# Patient Record
Sex: MALE | Race: WHITE | Employment: OTHER | URBAN - METROPOLITAN AREA
[De-identification: names, ages, dates, MRNs, and addresses within clinical notes are randomized per-mention and may not be internally consistent; named-entity substitution may affect disease eponyms.]

---

## 2017-06-21 PROBLEM — R07.9 CHEST PAIN: Status: ACTIVE | Noted: 2017-06-21

## 2017-06-22 PROBLEM — R07.2 PRECORDIAL PAIN: Status: ACTIVE | Noted: 2017-06-21

## 2017-09-11 PROBLEM — R07.2 PRECORDIAL PAIN: Status: RESOLVED | Noted: 2017-06-21 | Resolved: 2017-09-11

## 2017-09-11 PROBLEM — R07.9 CHEST PAIN: Status: ACTIVE | Noted: 2017-09-11

## 2018-07-09 ENCOUNTER — HOSPITAL ENCOUNTER (OUTPATIENT)
Age: 68
Discharge: HOME | End: 2018-07-11
Payer: COMMERCIAL

## 2018-07-09 PROCEDURE — 88305 TISSUE EXAM BY PATHOLOGIST: CPT

## 2018-08-10 ENCOUNTER — HOSPITAL ENCOUNTER (EMERGENCY)
Age: 68
Discharge: OTHER INSTITUTION | End: 2018-08-10
Attending: EMERGENCY MEDICINE
Payer: COMMERCIAL

## 2018-08-10 VITALS
HEART RATE: 64 BPM | DIASTOLIC BLOOD PRESSURE: 82 MMHG | SYSTOLIC BLOOD PRESSURE: 121 MMHG | TEMPERATURE: 98.2 F | RESPIRATION RATE: 16 BRPM | BODY MASS INDEX: 25.03 KG/M2 | OXYGEN SATURATION: 98 % | WEIGHT: 195 LBS | HEIGHT: 74 IN

## 2018-08-10 DIAGNOSIS — R45.851 SUICIDAL IDEATION: Primary | ICD-10-CM

## 2018-08-10 LAB
ACETAMINOPHEN LEVEL: <5 MCG/ML (ref 10–30)
ALBUMIN SERPL-MCNC: 4.2 G/DL (ref 3.5–5.2)
ALP BLD-CCNC: 88 U/L (ref 40–129)
ALT SERPL-CCNC: 13 U/L (ref 0–40)
AMPHETAMINE SCREEN, URINE: NOT DETECTED
ANION GAP SERPL CALCULATED.3IONS-SCNC: 11 MMOL/L (ref 7–16)
AST SERPL-CCNC: 19 U/L (ref 0–39)
BARBITURATE SCREEN URINE: NOT DETECTED
BASOPHILS ABSOLUTE: 0.03 E9/L (ref 0–0.2)
BASOPHILS RELATIVE PERCENT: 0.5 % (ref 0–2)
BENZODIAZEPINE SCREEN, URINE: NOT DETECTED
BILIRUB SERPL-MCNC: 0.4 MG/DL (ref 0–1.2)
BILIRUBIN URINE: NEGATIVE
BLOOD, URINE: NEGATIVE
BUN BLDV-MCNC: 14 MG/DL (ref 8–23)
CALCIUM SERPL-MCNC: 9.1 MG/DL (ref 8.6–10.2)
CANNABINOID SCREEN URINE: NOT DETECTED
CHLORIDE BLD-SCNC: 104 MMOL/L (ref 98–107)
CLARITY: CLEAR
CO2: 26 MMOL/L (ref 22–29)
COCAINE METABOLITE SCREEN URINE: NOT DETECTED
COLOR: YELLOW
CREAT SERPL-MCNC: 1.1 MG/DL (ref 0.7–1.2)
EOSINOPHILS ABSOLUTE: 0.14 E9/L (ref 0.05–0.5)
EOSINOPHILS RELATIVE PERCENT: 2.4 % (ref 0–6)
ETHANOL: <10 MG/DL (ref 0–0.08)
GFR AFRICAN AMERICAN: >60
GFR NON-AFRICAN AMERICAN: >60 ML/MIN/1.73
GLUCOSE BLD-MCNC: 96 MG/DL (ref 74–109)
GLUCOSE URINE: NEGATIVE MG/DL
HCT VFR BLD CALC: 42.4 % (ref 37–54)
HEMOGLOBIN: 14.1 G/DL (ref 12.5–16.5)
IMMATURE GRANULOCYTES #: 0.02 E9/L
IMMATURE GRANULOCYTES %: 0.3 % (ref 0–5)
KETONES, URINE: NEGATIVE MG/DL
LEUKOCYTE ESTERASE, URINE: NEGATIVE
LITHIUM DOSE AMOUNT: ABNORMAL
LITHIUM LEVEL: 0.13 MMOL/L (ref 0.5–1.5)
LYMPHOCYTES ABSOLUTE: 1.25 E9/L (ref 1.5–4)
LYMPHOCYTES RELATIVE PERCENT: 21.8 % (ref 20–42)
MCH RBC QN AUTO: 30.7 PG (ref 26–35)
MCHC RBC AUTO-ENTMCNC: 33.3 % (ref 32–34.5)
MCV RBC AUTO: 92.4 FL (ref 80–99.9)
METHADONE SCREEN, URINE: NOT DETECTED
MONOCYTES ABSOLUTE: 0.66 E9/L (ref 0.1–0.95)
MONOCYTES RELATIVE PERCENT: 11.5 % (ref 2–12)
NEUTROPHILS ABSOLUTE: 3.64 E9/L (ref 1.8–7.3)
NEUTROPHILS RELATIVE PERCENT: 63.5 % (ref 43–80)
NITRITE, URINE: NEGATIVE
OPIATE SCREEN URINE: NOT DETECTED
PDW BLD-RTO: 13.3 FL (ref 11.5–15)
PH UA: 6 (ref 5–9)
PHENCYCLIDINE SCREEN URINE: NOT DETECTED
PLATELET # BLD: 195 E9/L (ref 130–450)
PMV BLD AUTO: 9.9 FL (ref 7–12)
POTASSIUM SERPL-SCNC: 4 MMOL/L (ref 3.5–5)
PROPOXYPHENE SCREEN: NOT DETECTED
PROTEIN UA: NEGATIVE MG/DL
RBC # BLD: 4.59 E12/L (ref 3.8–5.8)
SALICYLATE, SERUM: <0.3 MG/DL (ref 0–30)
SODIUM BLD-SCNC: 141 MMOL/L (ref 132–146)
SPECIFIC GRAVITY UA: 1.01 (ref 1–1.03)
TOTAL PROTEIN: 6.6 G/DL (ref 6.4–8.3)
TRICYCLIC ANTIDEPRESSANTS SCREEN SERUM: NEGATIVE NG/ML
UROBILINOGEN, URINE: 0.2 E.U./DL
WBC # BLD: 5.7 E9/L (ref 4.5–11.5)

## 2018-08-10 PROCEDURE — 80178 ASSAY OF LITHIUM: CPT

## 2018-08-10 PROCEDURE — 81003 URINALYSIS AUTO W/O SCOPE: CPT

## 2018-08-10 PROCEDURE — 80053 COMPREHEN METABOLIC PANEL: CPT

## 2018-08-10 PROCEDURE — 85025 COMPLETE CBC W/AUTO DIFF WBC: CPT

## 2018-08-10 PROCEDURE — 99285 EMERGENCY DEPT VISIT HI MDM: CPT

## 2018-08-10 PROCEDURE — 36415 COLL VENOUS BLD VENIPUNCTURE: CPT

## 2018-08-10 PROCEDURE — G0480 DRUG TEST DEF 1-7 CLASSES: HCPCS

## 2018-08-10 PROCEDURE — 80307 DRUG TEST PRSMV CHEM ANLYZR: CPT

## 2018-08-10 RX ORDER — OMEGA-3 FATTY ACIDS CAP DELAYED RELEASE 1000 MG 1000 MG
2000 CAPSULE DELAYED RELEASE ORAL DAILY
COMMUNITY
End: 2019-09-17 | Stop reason: ALTCHOICE

## 2018-08-10 RX ORDER — ARIPIPRAZOLE 2 MG/1
2 TABLET ORAL DAILY
Status: ON HOLD | COMMUNITY
End: 2020-04-09 | Stop reason: SDUPTHER

## 2018-08-10 RX ORDER — SILDENAFIL 100 MG/1
50 TABLET, FILM COATED ORAL DAILY PRN
COMMUNITY

## 2018-08-10 RX ORDER — VENLAFAXINE HYDROCHLORIDE 37.5 MG/1
37.5 CAPSULE, EXTENDED RELEASE ORAL DAILY
Status: ON HOLD | COMMUNITY
End: 2020-04-09 | Stop reason: HOSPADM

## 2018-08-10 RX ORDER — ESCITALOPRAM OXALATE 10 MG/1
10 TABLET ORAL DAILY
COMMUNITY
End: 2019-09-17 | Stop reason: ALTCHOICE

## 2018-08-10 RX ORDER — LITHIUM CARBONATE 150 MG/1
450 CAPSULE ORAL NIGHTLY
COMMUNITY
End: 2019-09-17 | Stop reason: ALTCHOICE

## 2018-08-10 NOTE — ED NOTES
Call to Ayo at the [facility] who stated that they may be able to take this pt- paperwork faxed.      Isaias Leonardo, [facility]  08/10/18 1152
[facility], [facility]  08/10/18 2212

## 2018-08-10 NOTE — ED PROVIDER NOTES
pathological murmurs, ectopy, gallops, or rubs. GI:  Abdomen Soft, nontender, good bowel sounds. No firm or pulsatile mass. Back:  No costovertebral tenderness. Integument:  Normal turgor. Warm, dry, without visible rash, unless noted elsewhere. Lymphatics: No lymphangitis or adenopathy noted. Neurological:  Oriented. Motor functions intact. Psychiatric:        Thought Process:       Coherent:  Yes. Delusions / Paranoia: no evidence of delusions and no evidence of paranoia. Flight of ideas:  No.         Rambling conversation:  No.       Affect: flat. Suicidal ideation:  suicidal ideation with no plan or intent. Homicidal ideation:  no homicidal ideation. Perceptions:  denies any perceptual disturbance present. Insight: above average. Judgement: above average.     Lab / Imaging Results   (All laboratory and radiology results have been personally reviewed by myself)  Labs:  Results for orders placed or performed during the hospital encounter of 08/10/18   CBC Auto Differential   Result Value Ref Range    WBC 5.7 4.5 - 11.5 E9/L    RBC 4.59 3.80 - 5.80 E12/L    Hemoglobin 14.1 12.5 - 16.5 g/dL    Hematocrit 42.4 37.0 - 54.0 %    MCV 92.4 80.0 - 99.9 fL    MCH 30.7 26.0 - 35.0 pg    MCHC 33.3 32.0 - 34.5 %    RDW 13.3 11.5 - 15.0 fL    Platelets 581 566 - 738 E9/L    MPV 9.9 7.0 - 12.0 fL    Neutrophils % 63.5 43.0 - 80.0 %    Immature Granulocytes % 0.3 0.0 - 5.0 %    Lymphocytes % 21.8 20.0 - 42.0 %    Monocytes % 11.5 2.0 - 12.0 %    Eosinophils % 2.4 0.0 - 6.0 %    Basophils % 0.5 0.0 - 2.0 %    Neutrophils # 3.64 1.80 - 7.30 E9/L    Immature Granulocytes # 0.02 E9/L    Lymphocytes # 1.25 (L) 1.50 - 4.00 E9/L    Monocytes # 0.66 0.10 - 0.95 E9/L    Eosinophils # 0.14 0.05 - 0.50 E9/L    Basophils # 0.03 0.00 - 0.20 E9/L   Comprehensive Metabolic Panel   Result Value Ref Range    Sodium 141 132 - 146 mmol/L    Potassium 4.0 3.5 - 5.0 mmol/L    Chloride 104 98 - 107 mmol/L    CO2 26 22 - 29 mmol/L    Anion Gap 11 7 - 16 mmol/L    Glucose 96 74 - 109 mg/dL    BUN 14 8 - 23 mg/dL    CREATININE 1.1 0.7 - 1.2 mg/dL    GFR Non-African American >60 >=60 mL/min/1.73    GFR African American >60     Calcium 9.1 8.6 - 10.2 mg/dL    Total Protein 6.6 6.4 - 8.3 g/dL    Alb 4.2 3.5 - 5.2 g/dL    Total Bilirubin 0.4 0.0 - 1.2 mg/dL    Alkaline Phosphatase 88 40 - 129 U/L    ALT 13 0 - 40 U/L    AST 19 0 - 39 U/L   Urinalysis   Result Value Ref Range    Color, UA Yellow Straw/Yellow    Clarity, UA Clear Clear    Glucose, Ur Negative Negative mg/dL    Bilirubin Urine Negative Negative    Ketones, Urine Negative Negative mg/dL    Specific Gravity, UA 1.010 1.005 - 1.030    Blood, Urine Negative Negative    pH, UA 6.0 5.0 - 9.0    Protein, UA Negative Negative mg/dL    Urobilinogen, Urine 0.2 <2.0 E.U./dL    Nitrite, Urine Negative Negative    Leukocyte Esterase, Urine Negative Negative   Urine Drug Screen   Result Value Ref Range    Amphetamine Screen, Urine NOT DETECTED Negative <1000 ng/mL    Barbiturate Screen, Ur NOT DETECTED Negative < 200 ng/mL    Benzodiazepine Screen, Urine NOT DETECTED Negative < 200 ng/mL    Cannabinoid Scrn, Ur NOT DETECTED Negative < 50ng/mL    Cocaine Metabolite Screen, Urine NOT DETECTED Negative < 300 ng/mL    Opiate Scrn, Ur NOT DETECTED Negative < 300ng/mL    PCP Screen, Urine NOT DETECTED Negative < 25 ng/mL    Methadone Screen, Urine NOT DETECTED Negative <300 ng/mL    Propoxyphene Scrn, Ur NOT DETECTED Negative <300 ng/mL   Serum Drug Screen   Result Value Ref Range    Ethanol Lvl <10 mg/dL    Acetaminophen Level <5.0 (L) 10.0 - 57.8 mcg/mL    Salicylate, Serum <9.5 0.0 - 30.0 mg/dL    TCA Scrn NEGATIVE Cutoff:300 ng/mL   Lithium Level   Result Value Ref Range    Lithium Lvl 0.13 (L) 0.50 - 1.50 mmol/L    Lithium Dose Amount Unknown      Imaging: All Radiology results interpreted by Radiologist unless otherwise noted.   No orders to display       ED Course / Medical Decision Making   Medications - No data to display     Consult(s):   IP CONSULT TO SOCIAL WORK    Procedure(s):   none    MDM:   Patient presents via EMS after eloping from a psychiatrist appointment at the [place]. He was there at the clinic and had already been seen by his therapist. Concern for flight given his recent behavior. He has previous suicide attempts though denies current plan or intent. Lithium level is subtheraputic. Laboratory studies are grossly unremarkable. Patient has been medically cleared. Hemodynamically stable. Final disposition will be at the discretion of social work. Counseling: The emergency provider has spoken with the patient and discussed todays results, in addition to providing specific details for the plan of care and counseling regarding the diagnosis and prognosis. Questions are answered at this time and they are agreeable with the plan. Assessment      1. Suicidal ideation      Plan   Disposition at the discretion of social work  Patient condition is stable    New Medications     New Prescriptions    No medications on file     Electronically signed by RITA Bedolla CNP   DD: 8/10/18  **This report was transcribed using voice recognition software. Every effort was made to ensure accuracy; however, inadvertent computerized transcription errors may be present.   END OF ED PROVIDER NOTE       RITA Moran CNP  08/10/18 0952

## 2018-09-20 ENCOUNTER — HOSPITAL ENCOUNTER (OUTPATIENT)
Age: 68
Discharge: HOME | End: 2018-09-22
Payer: COMMERCIAL

## 2018-09-20 ENCOUNTER — HOSPITAL ENCOUNTER (OUTPATIENT)
Dept: MRI IMAGING | Age: 68
Discharge: HOME | End: 2018-09-22
Payer: COMMERCIAL

## 2018-09-20 DIAGNOSIS — M51.36 OTHER INTERVERTEBRAL DISC DEGENERATION, LUMBAR REGION: ICD-10-CM

## 2018-09-20 DIAGNOSIS — M54.16 LUMBAR RADICULOPATHY: ICD-10-CM

## 2018-09-20 PROCEDURE — 72158 MRI LUMBAR SPINE W/O & W/DYE: CPT

## 2018-09-20 PROCEDURE — 6360000004 HC RX CONTRAST MEDICATION: Performed by: RADIOLOGY

## 2018-09-20 PROCEDURE — A9579 GAD-BASE MR CONTRAST NOS,1ML: HCPCS | Performed by: RADIOLOGY

## 2018-09-20 RX ADMIN — GADOTERIDOL 18 ML: 279.3 INJECTION, SOLUTION INTRAVENOUS at 12:10

## 2019-09-17 ENCOUNTER — OFFICE VISIT (OUTPATIENT)
Dept: NEUROLOGY | Age: 69
End: 2019-09-17
Payer: COMMERCIAL

## 2019-09-17 ENCOUNTER — TELEPHONE (OUTPATIENT)
Dept: NEUROLOGY | Age: 69
End: 2019-09-17

## 2019-09-17 ENCOUNTER — HOSPITAL ENCOUNTER (OUTPATIENT)
Age: 69
Discharge: HOME | End: 2019-09-17
Payer: COMMERCIAL

## 2019-09-17 VITALS
HEART RATE: 51 BPM | BODY MASS INDEX: 24.64 KG/M2 | RESPIRATION RATE: 18 BRPM | DIASTOLIC BLOOD PRESSURE: 80 MMHG | SYSTOLIC BLOOD PRESSURE: 123 MMHG | WEIGHT: 192 LBS | OXYGEN SATURATION: 94 % | TEMPERATURE: 97.4 F | HEIGHT: 74 IN

## 2019-09-17 DIAGNOSIS — G31.84 MCI (MILD COGNITIVE IMPAIRMENT): ICD-10-CM

## 2019-09-17 DIAGNOSIS — F03.90 DEMENTIA WITHOUT BEHAVIORAL DISTURBANCE, UNSPECIFIED DEMENTIA TYPE: Primary | ICD-10-CM

## 2019-09-17 DIAGNOSIS — F03.90 DEMENTIA WITHOUT BEHAVIORAL DISTURBANCE, UNSPECIFIED DEMENTIA TYPE: ICD-10-CM

## 2019-09-17 LAB
FOLATE: >20 NG/ML (ref 4.8–24.2)
TSH SERPL DL<=0.05 MIU/L-ACNC: 5.05 UIU/ML (ref 0.27–4.2)
VITAMIN B-12: 547 PG/ML (ref 211–946)
VITAMIN D 25-HYDROXY: 37 NG/ML (ref 30–100)

## 2019-09-17 PROCEDURE — 82746 ASSAY OF FOLIC ACID SERUM: CPT

## 2019-09-17 PROCEDURE — 99483 ASSMT & CARE PLN PT COG IMP: CPT | Performed by: PSYCHIATRY & NEUROLOGY

## 2019-09-17 PROCEDURE — 36415 COLL VENOUS BLD VENIPUNCTURE: CPT

## 2019-09-17 PROCEDURE — 82306 VITAMIN D 25 HYDROXY: CPT

## 2019-09-17 PROCEDURE — 82607 VITAMIN B-12: CPT

## 2019-09-17 PROCEDURE — 99244 OFF/OP CNSLTJ NEW/EST MOD 40: CPT | Performed by: PSYCHIATRY & NEUROLOGY

## 2019-09-17 PROCEDURE — 84443 ASSAY THYROID STIM HORMONE: CPT

## 2019-09-17 PROCEDURE — 84425 ASSAY OF VITAMIN B-1: CPT

## 2019-09-17 RX ORDER — DONEPEZIL HYDROCHLORIDE 5 MG/1
5 TABLET, FILM COATED ORAL NIGHTLY
Qty: 30 TABLET | Refills: 0 | Status: SHIPPED | OUTPATIENT
Start: 2019-09-17 | End: 2019-10-21 | Stop reason: SDUPTHER

## 2019-09-17 ASSESSMENT — ENCOUNTER SYMPTOMS
VOMITING: 0
NAUSEA: 0
SHORTNESS OF BREATH: 0
TROUBLE SWALLOWING: 0
PHOTOPHOBIA: 0

## 2019-09-17 NOTE — PROGRESS NOTES
NEUROLOGY NEW PATIENT NOTE     Date: 9/17/2019  Name: Qi Caceres  MRN: [id number]  Patient's PCP: Paula Dickinson MD     REASON FOR VISIT/CHIEF COMPLAINT: New onset memory loss, concerns for Alzheimer's dementia. HISTORY OF PRESENT ILLNESS: Qi Caceres is a 71 y.o.  male with a past medical history of lumbar spinal stenosis status post surgery, anxiety, depression is coming in for complaints of memory loss. Dementia    Onset: Started about 6 months ago and has been progressively getting worse  Duration: Ongoing  Context:The patient reports that he was recently fired about 1 week ago from a job. He was a  and he forgot to strap in the wheelchairs while driving the [employer] when the patient is wearing the wheelchair. Aggravating factors: No noticeable aggravating factors  Relieving factors: No relieving factors    Associated symptoms: The patient also complains of significant associated symptoms and reports that he has been having problem remembering things, he repeats himself over and over again, he has also noticed that whenever his wife gives him chores he forgets to do them, he has also noticed problems while driving and says that he is confused while driving but has never gotten lost while driving. He also has difficulty remembering names of people, he also reports difficulty learning new things,. He does not have any problem remembering old events. However he is unable to remember new things. Many instances she forgets to turn off the gas of her cooking, and he also forgets to turn of the one after baking. He leaves the water running in the kitchen in the bathroom. He denies any speech problems, denies any swallowing problem, no falls, denies any nausea vomiting, headache. He does have history of back pain in the past stenosis and reports pain in the legs while working. Activities of daily living:  This is significantly affected his activities of daily living, he has

## 2019-09-18 ENCOUNTER — TELEPHONE (OUTPATIENT)
Dept: NEUROLOGY | Age: 69
End: 2019-09-18

## 2019-09-18 DIAGNOSIS — E03.9 HYPOTHYROIDISM, UNSPECIFIED TYPE: Primary | ICD-10-CM

## 2019-09-18 RX ORDER — LEVOTHYROXINE SODIUM 0.03 MG/1
25 TABLET ORAL DAILY
Qty: 30 TABLET | Refills: 3 | Status: SHIPPED | OUTPATIENT
Start: 2019-09-18 | End: 2019-12-30

## 2019-09-18 NOTE — TELEPHONE ENCOUNTER
----- Message from Jose M Lynn MD sent at 9/18/2019  7:59 AM EDT -----  His thyroid hormone levels are low. I will prescribe him Synthroid. Follow-up with endocrinology.

## 2019-09-18 NOTE — TELEPHONE ENCOUNTER
Spoke with patient and instructed him that his Vitamin  D was borderline and to begin 5000 units of Vitamin D daily . His thyroid hormone levels were low and Dr Dagoberto Bradford ordered Synthroid 25mcg daily and a referral was sent to Dr Symone Pinto. pt verbalized understanding.

## 2019-09-24 LAB — VITAMIN B1 WHOLE BLOOD: 121 NMOL/L (ref 70–180)

## 2019-09-25 ENCOUNTER — HOSPITAL ENCOUNTER (OUTPATIENT)
Dept: MRI IMAGING | Age: 69
Discharge: HOME | End: 2019-09-27
Payer: COMMERCIAL

## 2019-09-25 DIAGNOSIS — F03.90 DEMENTIA WITHOUT BEHAVIORAL DISTURBANCE, UNSPECIFIED DEMENTIA TYPE: ICD-10-CM

## 2019-09-25 PROCEDURE — 70551 MRI BRAIN STEM W/O DYE: CPT

## 2019-09-26 ENCOUNTER — TELEPHONE (OUTPATIENT)
Dept: NEUROLOGY | Age: 69
End: 2019-09-26

## 2019-09-26 NOTE — TELEPHONE ENCOUNTER
----- Message from Charlyne Meckel, MD sent at 9/26/2019  8:27 AM EDT -----  MRI brain: No acute abnormality.  ( Nothing concerning )   Please inform the pt

## 2019-10-21 ENCOUNTER — OFFICE VISIT (OUTPATIENT)
Dept: NEUROLOGY | Age: 69
End: 2019-10-21
Payer: COMMERCIAL

## 2019-10-21 VITALS
SYSTOLIC BLOOD PRESSURE: 125 MMHG | WEIGHT: 192 LBS | TEMPERATURE: 97.2 F | DIASTOLIC BLOOD PRESSURE: 84 MMHG | OXYGEN SATURATION: 96 % | RESPIRATION RATE: 18 BRPM | BODY MASS INDEX: 24.64 KG/M2 | HEART RATE: 70 BPM | HEIGHT: 74 IN

## 2019-10-21 DIAGNOSIS — E03.9 HYPOTHYROIDISM, UNSPECIFIED TYPE: ICD-10-CM

## 2019-10-21 DIAGNOSIS — F33.9 EPISODE OF RECURRENT MAJOR DEPRESSIVE DISORDER, UNSPECIFIED DEPRESSION EPISODE SEVERITY (HCC): ICD-10-CM

## 2019-10-21 DIAGNOSIS — R41.3 MEMORY LOSS: ICD-10-CM

## 2019-10-21 DIAGNOSIS — F03.90 DEMENTIA WITHOUT BEHAVIORAL DISTURBANCE, UNSPECIFIED DEMENTIA TYPE: Primary | ICD-10-CM

## 2019-10-21 PROCEDURE — 99215 OFFICE O/P EST HI 40 MIN: CPT | Performed by: PSYCHIATRY & NEUROLOGY

## 2019-10-21 RX ORDER — DONEPEZIL HYDROCHLORIDE 5 MG/1
5 TABLET, FILM COATED ORAL NIGHTLY
Qty: 30 TABLET | Refills: 3 | Status: SHIPPED | OUTPATIENT
Start: 2019-10-21 | End: 2019-10-24

## 2019-10-21 ASSESSMENT — ENCOUNTER SYMPTOMS
NAUSEA: 0
TROUBLE SWALLOWING: 0
SHORTNESS OF BREATH: 0
VOMITING: 0
PHOTOPHOBIA: 0

## 2019-10-23 DIAGNOSIS — F03.90 DEMENTIA WITHOUT BEHAVIORAL DISTURBANCE, UNSPECIFIED DEMENTIA TYPE: ICD-10-CM

## 2019-10-24 RX ORDER — DONEPEZIL HYDROCHLORIDE 5 MG/1
TABLET, FILM COATED ORAL
Qty: 30 TABLET | Refills: 0 | Status: ON HOLD | OUTPATIENT
Start: 2019-10-24 | End: 2020-04-09 | Stop reason: HOSPADM

## 2019-12-30 ENCOUNTER — TELEPHONE (OUTPATIENT)
Dept: ENDOCRINOLOGY | Age: 69
End: 2019-12-30

## 2019-12-30 ENCOUNTER — OFFICE VISIT (OUTPATIENT)
Dept: ENDOCRINOLOGY | Age: 69
End: 2019-12-30
Payer: OTHER GOVERNMENT

## 2019-12-30 ENCOUNTER — HOSPITAL ENCOUNTER (OUTPATIENT)
Age: 69
Discharge: HOME | End: 2019-12-30
Payer: COMMERCIAL

## 2019-12-30 VITALS
RESPIRATION RATE: 16 BRPM | OXYGEN SATURATION: 98 % | DIASTOLIC BLOOD PRESSURE: 78 MMHG | SYSTOLIC BLOOD PRESSURE: 134 MMHG | WEIGHT: 194.2 LBS | HEIGHT: 74 IN | HEART RATE: 84 BPM | BODY MASS INDEX: 24.92 KG/M2

## 2019-12-30 DIAGNOSIS — R53.82 CHRONIC FATIGUE: ICD-10-CM

## 2019-12-30 DIAGNOSIS — E03.9 HYPOTHYROIDISM, UNSPECIFIED TYPE: Primary | ICD-10-CM

## 2019-12-30 DIAGNOSIS — E03.9 HYPOTHYROIDISM, UNSPECIFIED TYPE: ICD-10-CM

## 2019-12-30 LAB
CORTISOL TOTAL: 9.61 MCG/DL (ref 2.68–18.4)
T4 TOTAL: 8 MCG/DL (ref 4.5–11.7)
TSH SERPL DL<=0.05 MIU/L-ACNC: 4.03 UIU/ML (ref 0.27–4.2)

## 2019-12-30 PROCEDURE — 84436 ASSAY OF TOTAL THYROXINE: CPT

## 2019-12-30 PROCEDURE — 36415 COLL VENOUS BLD VENIPUNCTURE: CPT

## 2019-12-30 PROCEDURE — 99204 OFFICE O/P NEW MOD 45 MIN: CPT | Performed by: INTERNAL MEDICINE

## 2019-12-30 PROCEDURE — 82533 TOTAL CORTISOL: CPT

## 2019-12-30 PROCEDURE — 84443 ASSAY THYROID STIM HORMONE: CPT

## 2019-12-30 RX ORDER — LEVOTHYROXINE SODIUM 0.05 MG/1
50 TABLET ORAL DAILY
Qty: 90 TABLET | Refills: 3 | Status: SHIPPED
Start: 2019-12-30 | End: 2020-08-06 | Stop reason: SDUPTHER

## 2019-12-30 NOTE — TELEPHONE ENCOUNTER
Notify pt,  I have reviewed your recent results    Thyroid hormones are better than before but still in lower limit of normal    Will slightly increase levothyroxine from 25 to 50 mcg daily and repeat labs again in 6-8 weeks

## 2020-03-28 ENCOUNTER — HOSPITAL ENCOUNTER (INPATIENT)
Age: 70
LOS: 1 days | Discharge: ANOTHER HOSPITAL | DRG: 918 | End: 2020-03-28
Attending: EMERGENCY MEDICINE | Admitting: INTERNAL MEDICINE
Payer: COMMERCIAL

## 2020-03-28 ENCOUNTER — HOSPITAL ENCOUNTER (INPATIENT)
Age: 70
LOS: 5 days | Discharge: PSYCHIATRIC OR SUBSTANCE USE FACILITY | DRG: 918 | End: 2020-04-02
Attending: INTERNAL MEDICINE | Admitting: INTERNAL MEDICINE
Payer: COMMERCIAL

## 2020-03-28 VITALS
TEMPERATURE: 98.6 F | OXYGEN SATURATION: 98 % | HEART RATE: 97 BPM | DIASTOLIC BLOOD PRESSURE: 86 MMHG | RESPIRATION RATE: 18 BRPM | BODY MASS INDEX: 24.9 KG/M2 | WEIGHT: 194 LBS | SYSTOLIC BLOOD PRESSURE: 140 MMHG | HEIGHT: 74 IN

## 2020-03-28 PROBLEM — T52.8X1A METABOLIC ACIDOSIS DUE TO ETHYLENE GLYCOL: Status: ACTIVE | Noted: 2020-03-28

## 2020-03-28 PROBLEM — E87.29 METABOLIC ACIDOSIS DUE TO ETHYLENE GLYCOL: Status: ACTIVE | Noted: 2020-03-28

## 2020-03-28 PROBLEM — R07.9 CHEST PAIN: Status: RESOLVED | Noted: 2017-09-11 | Resolved: 2020-03-28

## 2020-03-28 PROBLEM — T50.902A DRUG OVERDOSES, INTENTIONAL SELF-HARM, INITIAL ENCOUNTER (HCC): Status: ACTIVE | Noted: 2020-03-28

## 2020-03-28 PROBLEM — T50.901A DRUG OVERDOSE, ACCIDENTAL OR UNINTENTIONAL, INITIAL ENCOUNTER: Status: ACTIVE | Noted: 2020-03-28

## 2020-03-28 PROBLEM — T52.8X1A ETHYLENE GLYCOL POISONING: Status: ACTIVE | Noted: 2020-03-28

## 2020-03-28 LAB
ACETAMINOPHEN LEVEL: <5 MCG/ML (ref 10–30)
ALBUMIN SERPL-MCNC: 5 G/DL (ref 3.5–5.2)
ALP BLD-CCNC: 128 U/L (ref 40–129)
ALT SERPL-CCNC: 16 U/L (ref 0–40)
AMPHETAMINE SCREEN, URINE: NOT DETECTED
ANION GAP SERPL CALCULATED.3IONS-SCNC: 14 MMOL/L (ref 7–16)
ANION GAP SERPL CALCULATED.3IONS-SCNC: 18 MMOL/L (ref 7–16)
ANION GAP SERPL CALCULATED.3IONS-SCNC: 22 MMOL/L (ref 7–16)
APTT: 29.5 SEC (ref 24.5–35.1)
AST SERPL-CCNC: 25 U/L (ref 0–39)
B.E.: -8 MMOL/L (ref -3–0)
B.E.: -8.6 MMOL/L (ref -3–0)
BACTERIA: ABNORMAL /HPF
BARBITURATE SCREEN URINE: NOT DETECTED
BASOPHILS ABSOLUTE: 0.03 E9/L (ref 0–0.2)
BASOPHILS RELATIVE PERCENT: 0.4 % (ref 0–2)
BENZODIAZEPINE SCREEN, URINE: NOT DETECTED
BILIRUB SERPL-MCNC: 0.3 MG/DL (ref 0–1.2)
BILIRUBIN URINE: NEGATIVE
BLOOD, URINE: NORMAL
BUN BLDV-MCNC: 13 MG/DL (ref 8–23)
BUN BLDV-MCNC: 15 MG/DL (ref 8–23)
BUN BLDV-MCNC: 17 MG/DL (ref 8–23)
CALCIUM SERPL-MCNC: 9 MG/DL (ref 8.6–10.2)
CALCIUM SERPL-MCNC: 9.4 MG/DL (ref 8.6–10.2)
CALCIUM SERPL-MCNC: 9.7 MG/DL (ref 8.6–10.2)
CANNABINOID SCREEN URINE: NOT DETECTED
CHLORIDE BLD-SCNC: 104 MMOL/L (ref 98–107)
CHLORIDE BLD-SCNC: 108 MMOL/L (ref 98–107)
CHLORIDE BLD-SCNC: 109 MMOL/L (ref 98–107)
CLARITY: CLEAR
CO2: 16 MMOL/L (ref 22–29)
CO2: 20 MMOL/L (ref 22–29)
CO2: 22 MMOL/L (ref 22–29)
COCAINE METABOLITE SCREEN URINE: NOT DETECTED
COLOR: YELLOW
CREAT SERPL-MCNC: 1.1 MG/DL (ref 0.7–1.2)
CREAT SERPL-MCNC: 1.3 MG/DL (ref 0.7–1.2)
CREAT SERPL-MCNC: 1.5 MG/DL (ref 0.7–1.2)
CRITICAL NOTIFICATION: YES
CRYSTALS, UA: ABNORMAL /HPF
DEVICE: ABNORMAL
DEVICE: ABNORMAL
EOSINOPHILS ABSOLUTE: 0.14 E9/L (ref 0.05–0.5)
EOSINOPHILS RELATIVE PERCENT: 1.7 % (ref 0–6)
ETHANOL: <10 MG/DL (ref 0–0.08)
FENTANYL SCREEN, URINE: NOT DETECTED
GFR AFRICAN AMERICAN: 56
GFR AFRICAN AMERICAN: >60
GFR AFRICAN AMERICAN: >60
GFR NON-AFRICAN AMERICAN: 46 ML/MIN/1.73
GFR NON-AFRICAN AMERICAN: 55 ML/MIN/1.73
GFR NON-AFRICAN AMERICAN: >60 ML/MIN/1.73
GLUCOSE BLD-MCNC: 106 MG/DL (ref 74–99)
GLUCOSE BLD-MCNC: 114 MG/DL (ref 74–99)
GLUCOSE BLD-MCNC: 116 MG/DL (ref 74–99)
GLUCOSE URINE: NEGATIVE MG/DL
HCO3 ARTERIAL: 16.8 MMOL/L (ref 22–26)
HCO3 ARTERIAL: 18.2 MMOL/L (ref 22–26)
HCT VFR BLD CALC: 51.6 % (ref 37–54)
HEMOGLOBIN: 16.5 G/DL (ref 12.5–16.5)
HYALINE CASTS: ABNORMAL /LPF (ref 0–2)
IMMATURE GRANULOCYTES #: 0.11 E9/L
IMMATURE GRANULOCYTES %: 1.3 % (ref 0–5)
INR BLD: 0.9
KETONES, URINE: NEGATIVE MG/DL
LACTIC ACID: 3.3 MMOL/L (ref 0.5–2.2)
LEUKOCYTE ESTERASE, URINE: NEGATIVE
LYMPHOCYTES ABSOLUTE: 1.59 E9/L (ref 1.5–4)
LYMPHOCYTES RELATIVE PERCENT: 19.2 % (ref 20–42)
Lab: NORMAL
MAGNESIUM: 2.3 MG/DL (ref 1.6–2.6)
MCH RBC QN AUTO: 31.3 PG (ref 26–35)
MCHC RBC AUTO-ENTMCNC: 32 % (ref 32–34.5)
MCV RBC AUTO: 97.7 FL (ref 80–99.9)
METHADONE SCREEN, URINE: NOT DETECTED
MONOCYTES ABSOLUTE: 0.75 E9/L (ref 0.1–0.95)
MONOCYTES RELATIVE PERCENT: 9 % (ref 2–12)
NEUTROPHILS ABSOLUTE: 5.67 E9/L (ref 1.8–7.3)
NEUTROPHILS RELATIVE PERCENT: 68.4 % (ref 43–80)
NITRITE, URINE: NEGATIVE
O2 SATURATION: 54.1 % (ref 92–98.5)
O2 SATURATION: 96.6 % (ref 92–98.5)
OPERATOR ID: 754
OPERATOR ID: 754
OPIATE SCREEN URINE: NOT DETECTED
OSMOLALITY: 313 MOSM/KG (ref 285–310)
OSMOLALITY: 316 MOSM/KG (ref 285–310)
OSMOLALITY: 324 MOSM/KG (ref 285–310)
OXYCODONE URINE: NOT DETECTED
PCO2 ARTERIAL: 32.7 MMHG (ref 35–45)
PCO2 ARTERIAL: 41.1 MMHG (ref 35–45)
PDW BLD-RTO: 14.2 FL (ref 11.5–15)
PH BLOOD GAS: 7.25 (ref 7.35–7.45)
PH BLOOD GAS: 7.32 (ref 7.35–7.45)
PH UA: 5 (ref 5–9)
PHENCYCLIDINE SCREEN URINE: NOT DETECTED
PHOSPHORUS: 2.8 MG/DL (ref 2.5–4.5)
PHOSPHORUS: 3.9 MG/DL (ref 2.5–4.5)
PLATELET # BLD: 249 E9/L (ref 130–450)
PMV BLD AUTO: 9.9 FL (ref 7–12)
PO2 ARTERIAL: 32.9 MMHG (ref 60–80)
PO2 ARTERIAL: 92.4 MMHG (ref 60–80)
POTASSIUM REFLEX MAGNESIUM: 4.7 MMOL/L (ref 3.5–5)
POTASSIUM SERPL-SCNC: 4.4 MMOL/L (ref 3.5–5)
POTASSIUM SERPL-SCNC: 4.5 MMOL/L (ref 3.5–5)
PROTEIN UA: NORMAL MG/DL
PROTHROMBIN TIME: 10.8 SEC (ref 9.3–12.4)
RBC # BLD: 5.28 E12/L (ref 3.8–5.8)
RBC UA: ABNORMAL /HPF (ref 0–2)
SALICYLATE, SERUM: <0.3 MG/DL (ref 0–30)
SODIUM BLD-SCNC: 142 MMOL/L (ref 132–146)
SODIUM BLD-SCNC: 145 MMOL/L (ref 132–146)
SODIUM BLD-SCNC: 146 MMOL/L (ref 132–146)
SOURCE, BLOOD GAS: ABNORMAL
SOURCE, BLOOD GAS: ABNORMAL
SPECIFIC GRAVITY UA: 1.02 (ref 1–1.03)
TOTAL PROTEIN: 8.2 G/DL (ref 6.4–8.3)
TRICYCLIC ANTIDEPRESSANTS SCREEN SERUM: NEGATIVE NG/ML
TSH SERPL DL<=0.05 MIU/L-ACNC: 2.79 UIU/ML (ref 0.27–4.2)
UROBILINOGEN, URINE: 0.2 E.U./DL
WBC # BLD: 8.3 E9/L (ref 4.5–11.5)
WBC UA: ABNORMAL /HPF (ref 0–5)

## 2020-03-28 PROCEDURE — 2500000003 HC RX 250 WO HCPCS: Performed by: INTERNAL MEDICINE

## 2020-03-28 PROCEDURE — G0480 DRUG TEST DEF 1-7 CLASSES: HCPCS

## 2020-03-28 PROCEDURE — 82803 BLOOD GASES ANY COMBINATION: CPT

## 2020-03-28 PROCEDURE — 2140000000 HC CCU INTERMEDIATE R&B

## 2020-03-28 PROCEDURE — 36415 COLL VENOUS BLD VENIPUNCTURE: CPT

## 2020-03-28 PROCEDURE — 2580000003 HC RX 258: Performed by: INTERNAL MEDICINE

## 2020-03-28 PROCEDURE — 85730 THROMBOPLASTIN TIME PARTIAL: CPT

## 2020-03-28 PROCEDURE — 2580000003 HC RX 258: Performed by: EMERGENCY MEDICINE

## 2020-03-28 PROCEDURE — 83605 ASSAY OF LACTIC ACID: CPT

## 2020-03-28 PROCEDURE — 82693 ASSAY OF ETHYLENE GLYCOL: CPT

## 2020-03-28 PROCEDURE — 83930 ASSAY OF BLOOD OSMOLALITY: CPT

## 2020-03-28 PROCEDURE — 6370000000 HC RX 637 (ALT 250 FOR IP): Performed by: NURSE PRACTITIONER

## 2020-03-28 PROCEDURE — 6360000002 HC RX W HCPCS: Performed by: EMERGENCY MEDICINE

## 2020-03-28 PROCEDURE — 93005 ELECTROCARDIOGRAM TRACING: CPT | Performed by: EMERGENCY MEDICINE

## 2020-03-28 PROCEDURE — 85025 COMPLETE CBC W/AUTO DIFF WBC: CPT

## 2020-03-28 PROCEDURE — 84443 ASSAY THYROID STIM HORMONE: CPT

## 2020-03-28 PROCEDURE — 80307 DRUG TEST PRSMV CHEM ANLYZR: CPT

## 2020-03-28 PROCEDURE — 85610 PROTHROMBIN TIME: CPT

## 2020-03-28 PROCEDURE — 6360000002 HC RX W HCPCS: Performed by: NURSE PRACTITIONER

## 2020-03-28 PROCEDURE — 84100 ASSAY OF PHOSPHORUS: CPT

## 2020-03-28 PROCEDURE — 99285 EMERGENCY DEPT VISIT HI MDM: CPT

## 2020-03-28 PROCEDURE — 96365 THER/PROPH/DIAG IV INF INIT: CPT

## 2020-03-28 PROCEDURE — 81001 URINALYSIS AUTO W/SCOPE: CPT

## 2020-03-28 PROCEDURE — 2580000003 HC RX 258: Performed by: NURSE PRACTITIONER

## 2020-03-28 PROCEDURE — 96375 TX/PRO/DX INJ NEW DRUG ADDON: CPT

## 2020-03-28 PROCEDURE — 2060000000 HC ICU INTERMEDIATE R&B

## 2020-03-28 PROCEDURE — 80053 COMPREHEN METABOLIC PANEL: CPT

## 2020-03-28 PROCEDURE — 80048 BASIC METABOLIC PNL TOTAL CA: CPT

## 2020-03-28 PROCEDURE — 83735 ASSAY OF MAGNESIUM: CPT

## 2020-03-28 RX ORDER — LEVOTHYROXINE SODIUM 0.05 MG/1
50 TABLET ORAL DAILY
Status: DISCONTINUED | OUTPATIENT
Start: 2020-03-28 | End: 2020-03-28 | Stop reason: HOSPADM

## 2020-03-28 RX ORDER — DONEPEZIL HYDROCHLORIDE 5 MG/1
5 TABLET, FILM COATED ORAL NIGHTLY
Status: DISCONTINUED | OUTPATIENT
Start: 2020-03-28 | End: 2020-03-28 | Stop reason: HOSPADM

## 2020-03-28 RX ORDER — ACETAMINOPHEN 650 MG/1
650 SUPPOSITORY RECTAL EVERY 6 HOURS PRN
Status: CANCELLED | OUTPATIENT
Start: 2020-03-28

## 2020-03-28 RX ORDER — ARIPIPRAZOLE 2 MG/1
2 TABLET ORAL DAILY
Status: DISCONTINUED | OUTPATIENT
Start: 2020-03-28 | End: 2020-03-28 | Stop reason: HOSPADM

## 2020-03-28 RX ORDER — DONEPEZIL HYDROCHLORIDE 5 MG/1
5 TABLET, FILM COATED ORAL NIGHTLY
Status: DISCONTINUED | OUTPATIENT
Start: 2020-03-29 | End: 2020-04-02 | Stop reason: HOSPADM

## 2020-03-28 RX ORDER — SODIUM CHLORIDE 0.9 % (FLUSH) 0.9 %
10 SYRINGE (ML) INJECTION EVERY 12 HOURS SCHEDULED
Status: CANCELLED | OUTPATIENT
Start: 2020-03-28

## 2020-03-28 RX ORDER — SODIUM CHLORIDE 0.9 % (FLUSH) 0.9 %
10 SYRINGE (ML) INJECTION EVERY 12 HOURS SCHEDULED
Status: DISCONTINUED | OUTPATIENT
Start: 2020-03-28 | End: 2020-03-28 | Stop reason: HOSPADM

## 2020-03-28 RX ORDER — VENLAFAXINE HYDROCHLORIDE 37.5 MG/1
37.5 CAPSULE, EXTENDED RELEASE ORAL DAILY
Status: CANCELLED | OUTPATIENT
Start: 2020-03-29

## 2020-03-28 RX ORDER — ARIPIPRAZOLE 2 MG/1
2 TABLET ORAL DAILY
Status: DISCONTINUED | OUTPATIENT
Start: 2020-03-29 | End: 2020-04-02 | Stop reason: HOSPADM

## 2020-03-28 RX ORDER — SODIUM CHLORIDE 0.9 % (FLUSH) 0.9 %
10 SYRINGE (ML) INJECTION PRN
Status: CANCELLED | OUTPATIENT
Start: 2020-03-28

## 2020-03-28 RX ORDER — ACETAMINOPHEN 325 MG/1
650 TABLET ORAL EVERY 6 HOURS PRN
Status: DISCONTINUED | OUTPATIENT
Start: 2020-03-28 | End: 2020-04-02 | Stop reason: HOSPADM

## 2020-03-28 RX ORDER — ONDANSETRON 2 MG/ML
4 INJECTION INTRAMUSCULAR; INTRAVENOUS EVERY 6 HOURS PRN
Status: DISCONTINUED | OUTPATIENT
Start: 2020-03-28 | End: 2020-03-28 | Stop reason: HOSPADM

## 2020-03-28 RX ORDER — ACETAMINOPHEN 650 MG/1
650 SUPPOSITORY RECTAL EVERY 6 HOURS PRN
Status: DISCONTINUED | OUTPATIENT
Start: 2020-03-28 | End: 2020-04-02 | Stop reason: HOSPADM

## 2020-03-28 RX ORDER — ACETAMINOPHEN 325 MG/1
650 TABLET ORAL EVERY 6 HOURS PRN
Status: DISCONTINUED | OUTPATIENT
Start: 2020-03-28 | End: 2020-03-28 | Stop reason: HOSPADM

## 2020-03-28 RX ORDER — PROMETHAZINE HYDROCHLORIDE 25 MG/1
12.5 TABLET ORAL EVERY 6 HOURS PRN
Status: CANCELLED | OUTPATIENT
Start: 2020-03-28

## 2020-03-28 RX ORDER — THIAMINE HYDROCHLORIDE 100 MG/ML
100 INJECTION, SOLUTION INTRAMUSCULAR; INTRAVENOUS DAILY
Status: CANCELLED | OUTPATIENT
Start: 2020-03-29

## 2020-03-28 RX ORDER — VENLAFAXINE HYDROCHLORIDE 37.5 MG/1
37.5 CAPSULE, EXTENDED RELEASE ORAL DAILY
Status: DISCONTINUED | OUTPATIENT
Start: 2020-03-28 | End: 2020-03-28 | Stop reason: HOSPADM

## 2020-03-28 RX ORDER — PROMETHAZINE HYDROCHLORIDE 25 MG/1
12.5 TABLET ORAL EVERY 6 HOURS PRN
Status: DISCONTINUED | OUTPATIENT
Start: 2020-03-28 | End: 2020-03-28 | Stop reason: HOSPADM

## 2020-03-28 RX ORDER — LEVOTHYROXINE SODIUM 0.05 MG/1
50 TABLET ORAL DAILY
Status: DISCONTINUED | OUTPATIENT
Start: 2020-03-29 | End: 2020-04-02 | Stop reason: HOSPADM

## 2020-03-28 RX ORDER — DONEPEZIL HYDROCHLORIDE 5 MG/1
5 TABLET, FILM COATED ORAL NIGHTLY
Status: CANCELLED | OUTPATIENT
Start: 2020-03-28

## 2020-03-28 RX ORDER — ARIPIPRAZOLE 2 MG/1
2 TABLET ORAL DAILY
Status: CANCELLED | OUTPATIENT
Start: 2020-03-29

## 2020-03-28 RX ORDER — 0.9 % SODIUM CHLORIDE 0.9 %
1000 INTRAVENOUS SOLUTION INTRAVENOUS ONCE
Status: COMPLETED | OUTPATIENT
Start: 2020-03-28 | End: 2020-03-28

## 2020-03-28 RX ORDER — ACETAMINOPHEN 325 MG/1
650 TABLET ORAL EVERY 6 HOURS PRN
Status: CANCELLED | OUTPATIENT
Start: 2020-03-28

## 2020-03-28 RX ORDER — SODIUM CHLORIDE 0.9 % (FLUSH) 0.9 %
10 SYRINGE (ML) INJECTION PRN
Status: DISCONTINUED | OUTPATIENT
Start: 2020-03-28 | End: 2020-04-02 | Stop reason: HOSPADM

## 2020-03-28 RX ORDER — ONDANSETRON 2 MG/ML
4 INJECTION INTRAMUSCULAR; INTRAVENOUS EVERY 6 HOURS PRN
Status: DISCONTINUED | OUTPATIENT
Start: 2020-03-28 | End: 2020-04-02 | Stop reason: HOSPADM

## 2020-03-28 RX ORDER — PROMETHAZINE HYDROCHLORIDE 25 MG/1
12.5 TABLET ORAL EVERY 6 HOURS PRN
Status: DISCONTINUED | OUTPATIENT
Start: 2020-03-28 | End: 2020-04-02 | Stop reason: HOSPADM

## 2020-03-28 RX ORDER — VENLAFAXINE HYDROCHLORIDE 37.5 MG/1
37.5 CAPSULE, EXTENDED RELEASE ORAL DAILY
Status: DISCONTINUED | OUTPATIENT
Start: 2020-03-29 | End: 2020-04-02 | Stop reason: HOSPADM

## 2020-03-28 RX ORDER — THIAMINE HYDROCHLORIDE 100 MG/ML
100 INJECTION, SOLUTION INTRAMUSCULAR; INTRAVENOUS DAILY
Status: DISCONTINUED | OUTPATIENT
Start: 2020-03-28 | End: 2020-03-28 | Stop reason: HOSPADM

## 2020-03-28 RX ORDER — LEVOTHYROXINE SODIUM 0.05 MG/1
50 TABLET ORAL DAILY
Status: CANCELLED | OUTPATIENT
Start: 2020-03-29

## 2020-03-28 RX ORDER — ONDANSETRON 2 MG/ML
4 INJECTION INTRAMUSCULAR; INTRAVENOUS EVERY 6 HOURS PRN
Status: CANCELLED | OUTPATIENT
Start: 2020-03-28

## 2020-03-28 RX ORDER — SODIUM CHLORIDE 0.9 % (FLUSH) 0.9 %
10 SYRINGE (ML) INJECTION EVERY 12 HOURS SCHEDULED
Status: DISCONTINUED | OUTPATIENT
Start: 2020-03-29 | End: 2020-04-02 | Stop reason: HOSPADM

## 2020-03-28 RX ORDER — THIAMINE HYDROCHLORIDE 100 MG/ML
100 INJECTION, SOLUTION INTRAMUSCULAR; INTRAVENOUS DAILY
Status: DISCONTINUED | OUTPATIENT
Start: 2020-03-29 | End: 2020-03-29

## 2020-03-28 RX ORDER — SODIUM CHLORIDE 0.9 % (FLUSH) 0.9 %
10 SYRINGE (ML) INJECTION PRN
Status: DISCONTINUED | OUTPATIENT
Start: 2020-03-28 | End: 2020-03-28 | Stop reason: HOSPADM

## 2020-03-28 RX ORDER — ACETAMINOPHEN 650 MG/1
650 SUPPOSITORY RECTAL EVERY 6 HOURS PRN
Status: DISCONTINUED | OUTPATIENT
Start: 2020-03-28 | End: 2020-03-28 | Stop reason: HOSPADM

## 2020-03-28 RX ADMIN — FOMEPIZOLE 1320 MG: 1 INJECTION, SOLUTION INTRAVENOUS at 10:06

## 2020-03-28 RX ADMIN — VENLAFAXINE HYDROCHLORIDE 37.5 MG: 37.5 CAPSULE, EXTENDED RELEASE ORAL at 14:48

## 2020-03-28 RX ADMIN — ONDANSETRON 4 MG: 2 INJECTION INTRAMUSCULAR; INTRAVENOUS at 14:48

## 2020-03-28 RX ADMIN — DONEPEZIL HYDROCHLORIDE 5 MG: 5 TABLET, FILM COATED ORAL at 20:43

## 2020-03-28 RX ADMIN — SODIUM CHLORIDE, PRESERVATIVE FREE 10 ML: 5 INJECTION INTRAVENOUS at 20:43

## 2020-03-28 RX ADMIN — THIAMINE HYDROCHLORIDE 100 MG: 100 INJECTION, SOLUTION INTRAMUSCULAR; INTRAVENOUS at 10:06

## 2020-03-28 RX ADMIN — SODIUM CHLORIDE 1000 ML: 9 INJECTION, SOLUTION INTRAVENOUS at 14:34

## 2020-03-28 RX ADMIN — PYRIDOXINE HYDROCHLORIDE 100 MG: 100 INJECTION, SOLUTION INTRAMUSCULAR; INTRAVENOUS at 10:06

## 2020-03-28 RX ADMIN — ARIPIPRAZOLE 2 MG: 2 TABLET ORAL at 14:48

## 2020-03-28 RX ADMIN — SODIUM BICARBONATE: 84 INJECTION, SOLUTION INTRAVENOUS at 15:11

## 2020-03-28 ASSESSMENT — PAIN SCALES - GENERAL
PAINLEVEL_OUTOF10: 0
PAINLEVEL_OUTOF10: 0

## 2020-03-28 ASSESSMENT — ENCOUNTER SYMPTOMS
WHEEZING: 0
EYE DISCHARGE: 0
EYE PAIN: 0
EYE REDNESS: 0
ABDOMINAL PAIN: 0
VOMITING: 1
NAUSEA: 0
COUGH: 0
SINUS PRESSURE: 0
DIARRHEA: 0
BACK PAIN: 0
SHORTNESS OF BREATH: 0
SORE THROAT: 0

## 2020-03-28 NOTE — ED PROVIDER NOTES
Patient is a 68-year-old male past medical history of depression, suicidal ideations presenting to the emergency department with suicide attempt. Patient states him and his wife were talking yesterday and they got in the \"typical marriage arguments\" and he became very depressed after. He states he then went down to his basement and drank approximately 6 to 7 ounces of antifreeze, a glass full. He states he did do it in a suicide attempt. He did not sleep last night and states he only got approximately 1 hour. He vomited approximately 30 minutes prior to arrival but that is the only episode that he had. He states he felt guilty about taking it and presented to the emergency department. Symptoms are severe in severity. Patient states he has no other complaints other than the one episode of vomiting. He denies any pain anywhere at this current time. He is still urinating. He does have history of suicidal attempts in the past.  This is the first time he took antifreeze. [place] any suicidal or homicidal ideations. Has followed with psychiatry in the past and states he still sees a hydrous through the [place]. Review of Systems   Constitutional: Negative for chills and fever. HENT: Negative for ear pain, sinus pressure and sore throat. Eyes: Negative for pain, discharge and redness. Respiratory: Negative for cough, shortness of breath and wheezing. Cardiovascular: Negative for chest pain. Gastrointestinal: Positive for vomiting. Negative for abdominal pain, diarrhea and nausea. Genitourinary: Negative for dysuria and frequency. Musculoskeletal: Negative for arthralgias and back pain. Skin: Negative for rash and wound. Neurological: Negative for weakness and headaches. Hematological: Negative for adenopathy. Psychiatric/Behavioral: Positive for self-injury and suicidal ideas. All other systems reviewed and are negative. Physical Exam  Vitals signs and nursing note reviewed. Tonsillectomy; and Colonoscopy. Social History:  reports that he has never smoked. He has never used smokeless tobacco. He reports that he does not drink alcohol or use drugs. Family History: family history includes Cancer in his brother and sister. The patients home medications have been reviewed.     Allergies: Ketamine    ------------------------------------------------ RESULTS ---------------------------------------------------    LABS:  Results for orders placed or performed during the hospital encounter of 03/28/20   CBC Auto Differential   Result Value Ref Range    WBC 8.3 4.5 - 11.5 E9/L    RBC 5.28 3.80 - 5.80 E12/L    Hemoglobin 16.5 12.5 - 16.5 g/dL    Hematocrit 51.6 37.0 - 54.0 %    MCV 97.7 80.0 - 99.9 fL    MCH 31.3 26.0 - 35.0 pg    MCHC 32.0 32.0 - 34.5 %    RDW 14.2 11.5 - 15.0 fL    Platelets 583 979 - 389 E9/L    MPV 9.9 7.0 - 12.0 fL    Neutrophils % 68.4 43.0 - 80.0 %    Immature Granulocytes % 1.3 0.0 - 5.0 %    Lymphocytes % 19.2 (L) 20.0 - 42.0 %    Monocytes % 9.0 2.0 - 12.0 %    Eosinophils % 1.7 0.0 - 6.0 %    Basophils % 0.4 0.0 - 2.0 %    Neutrophils Absolute 5.67 1.80 - 7.30 E9/L    Immature Granulocytes # 0.11 E9/L    Lymphocytes Absolute 1.59 1.50 - 4.00 E9/L    Monocytes Absolute 0.75 0.10 - 0.95 E9/L    Eosinophils Absolute 0.14 0.05 - 0.50 E9/L    Basophils Absolute 0.03 0.00 - 0.20 E9/L   Urinalysis, reflex to microscopic   Result Value Ref Range    Color, UA Yellow Straw/Yellow    Clarity, UA Clear Clear    Glucose, Ur Negative Negative mg/dL    Bilirubin Urine Negative Negative    Ketones, Urine Negative Negative mg/dL    Specific Gravity, UA 1.025 1.005 - 1.030    Blood, Urine TRACE-INTACT Negative    pH, UA 5.0 5.0 - 9.0    Protein, UA TRACE Negative mg/dL    Urobilinogen, Urine 0.2 <2.0 E.U./dL    Nitrite, Urine Negative Negative    Leukocyte Esterase, Urine Negative Negative   Urine Drug Screen   Result Value Ref Range    Amphetamine Screen, Urine NOT DETECTED Negative <1000 ng/mL    Barbiturate Screen, Ur NOT DETECTED Negative < 200 ng/mL    Benzodiazepine Screen, Urine NOT DETECTED Negative < 200 ng/mL    Cannabinoid Scrn, Ur NOT DETECTED Negative < 50ng/mL    Cocaine Metabolite Screen, Urine NOT DETECTED Negative < 300 ng/mL    Opiate Scrn, Ur NOT DETECTED Negative < 300ng/mL    PCP Screen, Urine NOT DETECTED Negative < 25 ng/mL    Methadone Screen, Urine NOT DETECTED Negative <300 ng/mL    Oxycodone Urine NOT DETECTED Negative <100 ng/mL    FENTANYL SCREEN, URINE NOT DETECTED Negative <1 ng/mL    Drug Screen Comment: see below    Serum Drug Screen   Result Value Ref Range    Ethanol Lvl <10 mg/dL    Acetaminophen Level <5.0 (L) 10.0 - 13.1 mcg/mL    Salicylate, Serum <7.2 0.0 - 30.0 mg/dL   Comprehensive Metabolic Panel w/ Reflex to MG   Result Value Ref Range    Sodium 142 132 - 146 mmol/L    Potassium reflex Magnesium 4.7 3.5 - 5.0 mmol/L    Chloride 104 98 - 107 mmol/L    CO2 16 (L) 22 - 29 mmol/L    Anion Gap 22 (H) 7 - 16 mmol/L    Glucose 114 (H) 74 - 99 mg/dL    BUN 13 8 - 23 mg/dL    CREATININE 1.1 0.7 - 1.2 mg/dL    GFR Non-African American >60 >=60 mL/min/1.73    GFR African American >60     Calcium 9.7 8.6 - 10.2 mg/dL    Total Protein 8.2 6.4 - 8.3 g/dL    Alb 5.0 3.5 - 5.2 g/dL    Total Bilirubin 0.3 0.0 - 1.2 mg/dL    Alkaline Phosphatase 128 40 - 129 U/L    ALT 16 0 - 40 U/L    AST 25 0 - 39 U/L   Microscopic Urinalysis   Result Value Ref Range    Hyaline Casts, UA 0-2 0 - 2 /LPF    WBC, UA NONE 0 - 5 /HPF    RBC, UA NONE 0 - 2 /HPF    Bacteria, UA NONE SEEN None Seen /HPF    Crystals, UA Many (A) None Seen /HPF   Osmolality, Serum   Result Value Ref Range    Osmolality 316 (H) 285 - 310 mOsm/Kg   Arterial Blood Gas, Respiratory Only   Result Value Ref Range    Source: Arterial     pH, Blood Gas 7.253 (L) 7.350 - 7.450    pCO2, Arterial 41.1 35.0 - 45.0 mmHg    pO2, Arterial 32.9 (LL) 60.0 - 80.0 mmHg    HCO3, Arterial 18.2 (L) 22.0 - 26.0 mmol/L Consultation:  I Spoke to [facility] with Dr. Laura Brunson (Medicine). Discussed case. They will admit this patient. Consultation:  I Spoke with Dr. Brien aZvala (nephrology). Discussed case. They will provide consultation. Jamel Guzman PROCEDURES:            none. COUNSELING:   I have spoken with the patient and discussed todays results, in addition to providing specific details for the plan of care and counseling regarding the diagnosis and prognosis.     --------------------------------------- IMPRESSION & DISPOSITION --------------------------------     IMPRESSION(s):  1. Ethylene glycol poisoning, intentional self-harm, initial encounter ([facility] [place] [address]    2. Suicidal ideation        This patient's ED course included: a personal history and physicial examination, re-evaluation prior to disposition, multiple bedside re-evaluations, IV medications, cardiac monitoring and continuous pulse oximetry    This patient has remained hemodynamically stable and been closely monitored during their ED course. DISPOSITION:  Disposition: Admit to telemetry. Patient condition is serious. END OF PROVIDER NOTE.           Tonny Duran DO  Resident  03/28/20 500 Renato Evans DO  Resident  03/28/20 5642

## 2020-03-28 NOTE — PROGRESS NOTES
John Cotton NP with Dr. Deonna Rousseau returned call regarding transfer to [facility] who states she will arrange transfer. If transfer is not initiated within an hour, call Dr. Doyle Johnson regarding this.

## 2020-03-29 PROBLEM — N17.9 AKI (ACUTE KIDNEY INJURY) (HCC): Status: ACTIVE | Noted: 2020-03-29

## 2020-03-29 LAB
ANION GAP SERPL CALCULATED.3IONS-SCNC: 12 MMOL/L (ref 7–16)
B.E.: -1.6 MMOL/L (ref -3–3)
BUN BLDV-MCNC: 16 MG/DL (ref 8–23)
CALCIUM SERPL-MCNC: 9 MG/DL (ref 8.6–10.2)
CHLORIDE BLD-SCNC: 102 MMOL/L (ref 98–107)
CO2: 27 MMOL/L (ref 22–29)
COHB: 0.6 % (ref 0–1.5)
CREAT SERPL-MCNC: 2 MG/DL (ref 0.7–1.2)
CRITICAL: ABNORMAL
DATE ANALYZED: ABNORMAL
DATE OF COLLECTION: ABNORMAL
EKG ATRIAL RATE: 88 BPM
EKG P AXIS: 55 DEGREES
EKG P-R INTERVAL: 168 MS
EKG Q-T INTERVAL: 366 MS
EKG QRS DURATION: 74 MS
EKG QTC CALCULATION (BAZETT): 442 MS
EKG R AXIS: 54 DEGREES
EKG T AXIS: 53 DEGREES
EKG VENTRICULAR RATE: 88 BPM
GFR AFRICAN AMERICAN: 40
GFR NON-AFRICAN AMERICAN: 33 ML/MIN/1.73
GLUCOSE BLD-MCNC: 116 MG/DL (ref 74–99)
HCO3: 21.8 MMOL/L (ref 22–26)
HCT VFR BLD CALC: 42.2 % (ref 37–54)
HEMOGLOBIN: 13.2 G/DL (ref 12.5–16.5)
HHB: 3.6 % (ref 0–5)
LAB: ABNORMAL
Lab: ABNORMAL
MCH RBC QN AUTO: 29.7 PG (ref 26–35)
MCHC RBC AUTO-ENTMCNC: 31.3 % (ref 32–34.5)
MCV RBC AUTO: 95 FL (ref 80–99.9)
METHB: 0.3 % (ref 0–1.5)
MODE: ABNORMAL
O2 CONTENT: 20.2 ML/DL
O2 SATURATION: 96.4 % (ref 92–98.5)
O2HB: 95.5 % (ref 94–97)
OPERATOR ID: 2464
PATIENT TEMP: 37 C
PCO2: 33.3 MMHG (ref 35–45)
PDW BLD-RTO: 14.2 FL (ref 11.5–15)
PH BLOOD GAS: 7.43 (ref 7.35–7.45)
PLATELET # BLD: 147 E9/L (ref 130–450)
PMV BLD AUTO: 10.1 FL (ref 7–12)
PO2: 78.6 MMHG (ref 75–100)
POTASSIUM REFLEX MAGNESIUM: 3.6 MMOL/L (ref 3.5–5)
RBC # BLD: 4.44 E12/L (ref 3.8–5.8)
REPORT: NORMAL
SODIUM BLD-SCNC: 141 MMOL/L (ref 132–146)
SOURCE, BLOOD GAS: ABNORMAL
THB: 15 G/DL (ref 11.5–16.5)
TIME ANALYZED: 30
WBC # BLD: 11.2 E9/L (ref 4.5–11.5)

## 2020-03-29 PROCEDURE — 85027 COMPLETE CBC AUTOMATED: CPT

## 2020-03-29 PROCEDURE — 6370000000 HC RX 637 (ALT 250 FOR IP): Performed by: NURSE PRACTITIONER

## 2020-03-29 PROCEDURE — 82693 ASSAY OF ETHYLENE GLYCOL: CPT

## 2020-03-29 PROCEDURE — 6360000002 HC RX W HCPCS: Performed by: INTERNAL MEDICINE

## 2020-03-29 PROCEDURE — 36415 COLL VENOUS BLD VENIPUNCTURE: CPT

## 2020-03-29 PROCEDURE — 2140000000 HC CCU INTERMEDIATE R&B

## 2020-03-29 PROCEDURE — 99222 1ST HOSP IP/OBS MODERATE 55: CPT | Performed by: NURSE PRACTITIONER

## 2020-03-29 PROCEDURE — 80048 BASIC METABOLIC PNL TOTAL CA: CPT

## 2020-03-29 PROCEDURE — 6360000002 HC RX W HCPCS: Performed by: NURSE PRACTITIONER

## 2020-03-29 PROCEDURE — 93010 ELECTROCARDIOGRAM REPORT: CPT | Performed by: INTERNAL MEDICINE

## 2020-03-29 PROCEDURE — 82805 BLOOD GASES W/O2 SATURATION: CPT

## 2020-03-29 PROCEDURE — 99222 1ST HOSP IP/OBS MODERATE 55: CPT | Performed by: SURGERY

## 2020-03-29 PROCEDURE — 2500000003 HC RX 250 WO HCPCS: Performed by: NURSE PRACTITIONER

## 2020-03-29 PROCEDURE — 02H633Z INSERTION OF INFUSION DEVICE INTO RIGHT ATRIUM, PERCUTANEOUS APPROACH: ICD-10-PCS | Performed by: SURGERY

## 2020-03-29 PROCEDURE — 2580000003 HC RX 258: Performed by: NURSE PRACTITIONER

## 2020-03-29 PROCEDURE — 90935 HEMODIALYSIS ONE EVALUATION: CPT

## 2020-03-29 PROCEDURE — 80074 ACUTE HEPATITIS PANEL: CPT

## 2020-03-29 RX ORDER — THIAMINE HYDROCHLORIDE 100 MG/ML
100 INJECTION, SOLUTION INTRAMUSCULAR; INTRAVENOUS EVERY 8 HOURS
Status: DISCONTINUED | OUTPATIENT
Start: 2020-03-29 | End: 2020-04-02 | Stop reason: HOSPADM

## 2020-03-29 RX ADMIN — DONEPEZIL HYDROCHLORIDE 5 MG: 5 TABLET, FILM COATED ORAL at 22:08

## 2020-03-29 RX ADMIN — THIAMINE HYDROCHLORIDE 100 MG: 100 INJECTION, SOLUTION INTRAMUSCULAR; INTRAVENOUS at 22:07

## 2020-03-29 RX ADMIN — FOMEPIZOLE 880 MG: 1 INJECTION, SOLUTION INTRAVENOUS at 11:31

## 2020-03-29 RX ADMIN — THIAMINE HYDROCHLORIDE 100 MG: 100 INJECTION, SOLUTION INTRAMUSCULAR; INTRAVENOUS at 10:20

## 2020-03-29 RX ADMIN — Medication 10 ML: at 22:08

## 2020-03-29 RX ADMIN — LEVOTHYROXINE SODIUM 50 MCG: 0.05 TABLET ORAL at 10:21

## 2020-03-29 RX ADMIN — Medication 10 ML: at 10:20

## 2020-03-29 RX ADMIN — ENOXAPARIN SODIUM 40 MG: 40 INJECTION SUBCUTANEOUS at 10:20

## 2020-03-29 RX ADMIN — SODIUM BICARBONATE: 84 INJECTION, SOLUTION INTRAVENOUS at 14:57

## 2020-03-29 RX ADMIN — VENLAFAXINE HYDROCHLORIDE 37.5 MG: 37.5 CAPSULE, EXTENDED RELEASE ORAL at 10:21

## 2020-03-29 RX ADMIN — SODIUM BICARBONATE: 84 INJECTION, SOLUTION INTRAVENOUS at 00:00

## 2020-03-29 RX ADMIN — FOMEPIZOLE 880 MG: 1 INJECTION, SOLUTION INTRAVENOUS at 22:50

## 2020-03-29 RX ADMIN — ARIPIPRAZOLE 2 MG: 2 TABLET ORAL at 10:21

## 2020-03-29 ASSESSMENT — PAIN SCALES - GENERAL
PAINLEVEL_OUTOF10: 0

## 2020-03-29 NOTE — H&P
Dysfunction Take 1/2 tab of  (100 mg tab) by mouth = 50 mg 1 hour before sex  FOLIC ACID PO, Take 1 mg by mouth daily     Allergies:    Ketamine    Social History:    reports that he has never smoked. He has never used smokeless tobacco. He reports that he does not drink alcohol or use drugs. Family History:   family history includes Cancer in his brother and sister. REVIEW OF SYSTEMS:  As above in the HPI, otherwise negative    PHYSICAL EXAM:    Vitals:  /77   Pulse 90   Temp 99.4 °F (37.4 °C)   Resp 14   Ht 6' 2\" (1.88 m)   Wt 194 lb 10.7 oz (88.3 kg)   SpO2 94%   BMI 24.99 kg/m²     General:  Awake, alert, oriented X 3. Well developed, well nourished, well groomed. No apparent distress. HEENT:  Normocephalic, atraumatic. Pupils equal, round, reactive to light. No scleral icterus. No conjunctival injection. Normal lips, teeth, and gums. No nasal discharge. Neck:  Supple  Heart:  RRR, no murmurs, gallops, rubs  Lungs:  CTA bilaterally, bilat symmetrical expansion, no wheeze, rales, or rhonchi  Abdomen:   Bowel sounds present, soft, nontender, no masses, no organomegaly, no peritoneal signs  Extremities:  No clubbing, cyanosis, or edema  Skin:  Warm and dry, no open lesions or rash  Neuro:  Cranial nerves 2-12 intact, no focal deficits  Breast: deferred  Rectal: deferred  Genitalia:  deferred    LABS:    CBC with Differential:    Lab Results   Component Value Date    WBC 8.3 03/28/2020    RBC 5.28 03/28/2020    HGB 16.5 03/28/2020    HCT 51.6 03/28/2020     03/28/2020    MCV 97.7 03/28/2020    MCH 31.3 03/28/2020    MCHC 32.0 03/28/2020    RDW 14.2 03/28/2020    SEGSPCT 71 09/14/2011    LYMPHOPCT 19.2 03/28/2020    MONOPCT 9.0 03/28/2020    BASOPCT 0.4 03/28/2020    MONOSABS 0.75 03/28/2020    LYMPHSABS 1.59 03/28/2020    EOSABS 0.14 03/28/2020    BASOSABS 0.03 03/28/2020     CMP:    Lab Results   Component Value Date     03/28/2020    K 4.4 03/28/2020    K 4.7 03/28/2020  03/28/2020    CO2 22 03/28/2020    BUN 17 03/28/2020    CREATININE 1.5 03/28/2020    GFRAA 56 03/28/2020    LABGLOM 46 03/28/2020    GLUCOSE 116 03/28/2020    GLUCOSE 105 09/14/2011    PROT 8.2 03/28/2020    LABALBU 5.0 03/28/2020    LABALBU 3.9 09/14/2011    CALCIUM 9.0 03/28/2020    BILITOT 0.3 03/28/2020    ALKPHOS 128 03/28/2020    AST 25 03/28/2020    ALT 16 03/28/2020     Magnesium:    Lab Results   Component Value Date    MG 2.3 03/28/2020     Phosphorus:    Lab Results   Component Value Date    PHOS 2.8 03/28/2020     PT/INR:    Lab Results   Component Value Date    PROTIME 10.8 03/28/2020    PROTIME 11.5 09/13/2011    INR 0.9 03/28/2020     Last 3 Troponin:    Lab Results   Component Value Date    TROPONINI <0.01 10/14/2017    TROPONINI <0.01 09/10/2017    TROPONINI <0.01 06/21/2017     U/A:    Lab Results   Component Value Date    COLORU Yellow 03/28/2020    PROTEINU TRACE 03/28/2020    PHUR 5.0 03/28/2020    WBCUA NONE 03/28/2020    RBCUA NONE 03/28/2020    BACTERIA NONE SEEN 03/28/2020    CLARITYU Clear 03/28/2020    SPECGRAV 1.025 03/28/2020    LEUKOCYTESUR Negative 03/28/2020    UROBILINOGEN 0.2 03/28/2020    BILIRUBINUR Negative 03/28/2020    BLOODU TRACE-INTACT 03/28/2020    GLUCOSEU Negative 03/28/2020     ABG:    Lab Results   Component Value Date    PH 7.434 03/29/2020    PH 7.42 09/13/2011    PCO2 33.3 03/29/2020    PO2 78.6 03/29/2020    HCO3 21.8 03/29/2020    BE -1.6 03/29/2020    O2SAT 96.4 03/29/2020     HgBA1c:  No results found for: LABA1C  FLP:    Lab Results   Component Value Date    TRIG 118 09/11/2017    HDL 40 09/11/2017    LDLCALC 163 09/11/2017    LABVLDL 24 09/11/2017     TSH:    Lab Results   Component Value Date    TSH 2.790 03/28/2020       No orders to display       ASSESSMENT:      Principal Problem:    Drug overdoses, intentional self-harm, initial encounter [facility])  Active Problems:    Recurrent major depressive disorder ([facility] [address]    Mixed hyperlipidemia    Metabolic

## 2020-03-29 NOTE — PROGRESS NOTES
Dr. Omar Dickinson Notified via perfect serve that patient now has access site now and ok to use.  Trudi Domingo

## 2020-03-29 NOTE — PROGRESS NOTES
Physical Therapy    Facility/Department: [facility]    NAME: Anna Lundy  : 1950  MRN: [id number]        Date: 3/29/2020      Room #:  7942/1059-O      Physical therapy evaluation attempted however could not be completed at this time due to:     [] Pt refusal despite encouragement from PT  [] Pt currently off the unit at testing/procedure   [] Pt with nursing for hygiene care  [x] Pt at hemodialysis  [] Pt on hold and awaiting medical clarification   [] Other:   Comments: Will attempt again when pt is able to participate in evaluation. Thank you kindly for the opportunity to assist in the care of this pt.        Adalid Neal, PT, DPT   [id number]

## 2020-03-29 NOTE — PROGRESS NOTES
Co remains at bedside will go with patient to HD. Patient has no complaints of Suicide ideation.  Dillan Brooks

## 2020-03-29 NOTE — PROGRESS NOTES
cssrs documented. Patient belongings placed in medication room on PCCU with name lable on them. Patient in gown with no ties. Safety tray ordered. Educated patient on need for co and pink slip order and that psych has been consulted to see patient. No removable Ligature risks around patient. . Bruno Velasco

## 2020-03-29 NOTE — CONSULTS
VASCULARSURGERY  CONSULT NOTE  3/29/2020    Physician Consulted: Dr. Arnold Guzman  Reason for Consult: HD catheter  Referring Physician: Dr. Flor GARCIA  Yoanna Joseph is a 71 y.o. male who attempted suicide with ethylene glycol ingestion. He is found an osmolar gap of 22. He was transferred to [facility] for hemodialysis. Vascular surgery was consulted for hemodialysis catheter placement. He has a past medical history of depression. He denies any blood thinners. Past Medical History:   Diagnosis Date    Cancer [facility])     Degeneration of lumbar intervertebral disc 9/14/2011    Depression     HIGH CHOLESTEROL     Hypertriglyceridemia 9/14/2011    Major depressive disorder, recurrent episode, severe ([facility] [address] 9/14/2011    MDD (major depressive disorder), recurrent severe, without psychosis ([facility] [address]     Overactive bladder 9/14/2011    Prostate cancer ([facility] [address]     Suicidal ideations     Urinary incontinence        Past Surgical History:   Procedure Laterality Date    APPENDECTOMY      BACK SURGERY      lumbar disc, and cervical vertabrae    COLONOSCOPY      OTHER SURGICAL HISTORY  02/21/2014    Myelogram    PROSTATECTOMY      TONSILLECTOMY         Medications Prior to Admission    Prior to Admission medications    Medication Sig Start Date End Date Taking?  Authorizing Provider   venlafaxine (EFFEXOR XR) 37.5 MG extended release capsule Take 37.5 mg by mouth daily   Yes Historical Provider, MD   levothyroxine (SYNTHROID) 50 MCG tablet Take 1 tablet by mouth daily 12/30/19   Dany Altamirano MD   donepezil (ARICEPT) 5 MG tablet take 1 tablet by mouth every evening 10/24/19   Juancho Grider MD   ARIPiprazole (ABILIFY) 2 MG tablet Take 2 mg by mouth daily    Historical Provider, MD   sildenafil (VIAGRA) 100 MG tablet Take 50 mg by mouth daily as needed for Erectile Dysfunction Take 1/2 tab of  (100 mg tab) by mouth = 50 mg 1 hour before sex    Historical Provider, MD   FOLIC ACID PO Take 1 mg

## 2020-03-29 NOTE — PROGRESS NOTES
Patient being transported to [address] includes the [facility] room 4825, nurse to nurse called to [facility].

## 2020-03-29 NOTE — PROGRESS NOTES
Nephrology Attending   Inpatient Progress Note    Admit Date: 3/28/2020                                  PCP: Thalia Yi MD    Patient Active Problem List   Diagnosis    Hypertriglyceridemia    [facility]    Severe episode of recurrent major depressive disorder, without psychotic features ([facility]    Suicidal ideation    Mixed hyperlipidemia    Suicide attempt by substance overdose ([facility]    Metabolic acidosis due to ethylene glycol    Ethylene glycol poisoning    Drug overdose, accidental or unintentional, initial encounter    Drug overdoses, intentional self-harm, initial encounter [facility])       Subjective:      3/29: Patient followed by our group at [address] [place] [address] for intentional antifreeze ingestion; transferred to [facility] due to need for  Vascular Surgery for dialysis access insertion; emergent HD performed ; seen during HD he gives no c/o        Vitals:  VITALS:  /71   Pulse 87   Temp 99.4 °F (37.4 °C)   Resp 14   Ht 6' 2\" (1.88 m)   Wt 194 lb 10.7 oz (88.3 kg)   SpO2 94%   BMI 24.99 kg/m²   24HR INTAKE/OUTPUT:      Intake/Output Summary (Last 24 hours) at 3/29/2020 0817  Last data filed at 3/29/2020 0340  Gross per 24 hour   Intake 240 ml   Output --   Net 240 ml     CURRENT PULSE OXIMETRY:  SpO2: 94 %  24HR PULSE OXIMETRY RANGE:  SpO2  Av.5 %  Min: 94 %  Max: 100 %        I/O:      I/O last 3 completed shifts: In: 240 [P.O.:240]  Out: -   No intake/output data recorded.     Medications:    IV:   IV infusion builder 100 mL/hr at 20 0000      enoxaparin  40 mg Subcutaneous Daily    sodium chloride flush  10 mL Intravenous 2 times per day    ARIPiprazole  2 mg Oral Daily    donepezil  5 mg Oral Nightly    fomepizole (ANTIZOL) IVPB  10 mg/kg Intravenous Q12H    levothyroxine  50 mcg Oral Daily    thiamine  100 mg Intravenous Daily    venlafaxine  37.5 mg Oral Daily        Current Meds:  Current Facility-Administered Medications   Medication Dose Route Frequency Provider Last Rate Last Dose    acetaminophen (TYLENOL) tablet 650 mg  650 mg Oral Q6H PRN RITA Simon CNP        Or    acetaminophen (TYLENOL) suppository 650 mg  650 mg Rectal Q6H PRN RITA Martinez CNP        enoxaparin (LOVENOX) injection 40 mg  40 mg Subcutaneous Daily RITA Martinez CNP        magnesium hydroxide (MILK OF MAGNESIA) 400 MG/5ML suspension 30 mL  30 mL Oral Daily PRN RITA Martinez CNP        promethazine (PHENERGAN) tablet 12.5 mg  12.5 mg Oral Q6H PRN RITA Martinez CNP        Or    ondansetron (ZOFRAN) injection 4 mg  4 mg Intravenous Q6H PRN RITA Martinez CNP        sodium chloride flush 0.9 % injection 10 mL  10 mL Intravenous 2 times per day RITA Martinez CNP        sodium chloride flush 0.9 % injection 10 mL  10 mL Intravenous PRN RITA Martinez CNP        ARIPiprazole (ABILIFY) tablet 2 mg  2 mg Oral Daily RITA Martinez CNP        donepezil (ARICEPT) tablet 5 mg  5 mg Oral Nightly RITA Martinez CNP        fomepizole (ANTIZOL) 880 mg in sodium chloride 0.9 % 100 mL IVPB  10 mg/kg Intravenous Q12H RITA Martinez CNP        levothyroxine (SYNTHROID) tablet 50 mcg  50 mcg Oral Daily RITA Martinez CNP        sodium bicarbonate 150 mEq in dextrose 5 % 1,000 mL infusion   Intravenous Continuous RITA Martinez  mL/hr at 03/29/20 0000      thiamine (B-1) injection 100 mg  100 mg Intravenous Daily RITA Martinez CNP        venlafaxine (EFFEXOR XR) extended release capsule 37.5 mg  37.5 mg Oral Daily RITA Martinez CNP           Diet:  DIET GENERAL; Safety Tray; Safety Tray (Disposables)     EXAM:  General: No distress. Alert. Eyes: PERRL. No sclera icterus. No conjunctival injection. ENT: No discharge. Pharynx clear. Neck: Trachea midline.  Normal thyroid. Lungs: No accessory muscle use. No crackles. No wheezing. No rhonchi. CV: Regular rate. Regular rhythm. No murmur or rub. .   Abd: Non-tender. Non-distended. No masses. No organmegaly. Normal bowel sounds. Skin: Warm and dry. No nodule on exposed extremities. No rash on exposed extremities. Ext: No cyanosis, clubbing, edema ; temporary HD cath in R CFV  Neuro: Awake. Follows commands. Positive pupils/gag/corneals. Normal pain response. Results:   CBC:   Recent Labs     03/28/20  1000   WBC 8.3   HGB 16.5         BMP:    Recent Labs     03/28/20  1000 03/28/20  1425 03/28/20  1718    146 145   K 4.7 4.5 4.4    108* 109*   CO2 16* 20* 22   BUN 13 15 17   CREATININE 1.1 1.3* 1.5*   GLUCOSE 114* 106* 116*       Hepatic:   Recent Labs     03/28/20  1000   AST 25   ALT 16   BILITOT 0.3   ALKPHOS 128      Troponin: No results for input(s): TROPONINI in the last 72 hours. BNP: No results for input(s): BNP in the last 72 hours. Lipids: No results for input(s): CHOL, HDL in the last 72 hours. Invalid input(s): LDLCALCU   ABGs:   Lab Results   Component Value Date    PO2ART 92.4 03/28/2020    PJF2GFX 32.7 03/28/2020      INR:   Recent Labs     03/28/20  1425   INR 0.9         Assessment/Plans    1. DESIREE  Stage 1 due to ethylene glycol toxicity, nonoliguric   -Ethylene glycol levels ordered pre and post HD   -Hemodialysis; next treatment in am   -fomepizole, continue until level available  2. HAG metabolic acidosis with osmolar gap, strongly suggestive of toxic alcohol ingestion   -levels pending   -continue bicarb drip   3. Hypertension, controlled ; not requiring any antihypertensive now  4.  Attempted suicide   -Psych consult ; transfer once medically stable        -    Wade Meza MD       Department of Internal Medicine  Section of Nephrology  Dialysis Note        PROCEDURE:  Patient seen on hemodialysis at 8:17 AM    PHYSICIAN:  Macey Hameed M.D., FACP    INDICATION:  Acute

## 2020-03-30 LAB
ANION GAP SERPL CALCULATED.3IONS-SCNC: 11 MMOL/L (ref 7–16)
BASOPHILS ABSOLUTE: 0.03 E9/L (ref 0–0.2)
BASOPHILS RELATIVE PERCENT: 0.3 % (ref 0–2)
BUN BLDV-MCNC: 18 MG/DL (ref 8–23)
CALCIUM SERPL-MCNC: 8.7 MG/DL (ref 8.6–10.2)
CHLORIDE BLD-SCNC: 103 MMOL/L (ref 98–107)
CO2: 31 MMOL/L (ref 22–29)
CREAT SERPL-MCNC: 2.4 MG/DL (ref 0.7–1.2)
EOSINOPHILS ABSOLUTE: 0.18 E9/L (ref 0.05–0.5)
EOSINOPHILS RELATIVE PERCENT: 2.1 % (ref 0–6)
GFR AFRICAN AMERICAN: 33
GFR NON-AFRICAN AMERICAN: 27 ML/MIN/1.73
GLUCOSE BLD-MCNC: 103 MG/DL (ref 74–99)
HAV IGM SER IA-ACNC: NORMAL
HCT VFR BLD CALC: 40 % (ref 37–54)
HEMOGLOBIN: 12.8 G/DL (ref 12.5–16.5)
HEPATITIS B CORE IGM ANTIBODY: NORMAL
HEPATITIS B SURFACE ANTIGEN INTERPRETATION: NORMAL
HEPATITIS C ANTIBODY INTERPRETATION: NORMAL
IMMATURE GRANULOCYTES #: 0.03 E9/L
IMMATURE GRANULOCYTES %: 0.3 % (ref 0–5)
LYMPHOCYTES ABSOLUTE: 1.62 E9/L (ref 1.5–4)
LYMPHOCYTES RELATIVE PERCENT: 18.7 % (ref 20–42)
MCH RBC QN AUTO: 30 PG (ref 26–35)
MCHC RBC AUTO-ENTMCNC: 32 % (ref 32–34.5)
MCV RBC AUTO: 93.9 FL (ref 80–99.9)
MONOCYTES ABSOLUTE: 1.23 E9/L (ref 0.1–0.95)
MONOCYTES RELATIVE PERCENT: 14.2 % (ref 2–12)
NEUTROPHILS ABSOLUTE: 5.58 E9/L (ref 1.8–7.3)
NEUTROPHILS RELATIVE PERCENT: 64.4 % (ref 43–80)
OSMOLALITY: 296 MOSM/KG (ref 285–310)
PDW BLD-RTO: 13.8 FL (ref 11.5–15)
PLATELET # BLD: 141 E9/L (ref 130–450)
PMV BLD AUTO: 9.7 FL (ref 7–12)
POTASSIUM SERPL-SCNC: 3.6 MMOL/L (ref 3.5–5)
RBC # BLD: 4.26 E12/L (ref 3.8–5.8)
REPORT: NORMAL
SODIUM BLD-SCNC: 145 MMOL/L (ref 132–146)
WBC # BLD: 8.7 E9/L (ref 4.5–11.5)

## 2020-03-30 PROCEDURE — 6370000000 HC RX 637 (ALT 250 FOR IP): Performed by: NURSE PRACTITIONER

## 2020-03-30 PROCEDURE — 2580000003 HC RX 258: Performed by: NURSE PRACTITIONER

## 2020-03-30 PROCEDURE — 6360000002 HC RX W HCPCS: Performed by: NURSE PRACTITIONER

## 2020-03-30 PROCEDURE — 85025 COMPLETE CBC W/AUTO DIFF WBC: CPT

## 2020-03-30 PROCEDURE — 97161 PT EVAL LOW COMPLEX 20 MIN: CPT

## 2020-03-30 PROCEDURE — 82693 ASSAY OF ETHYLENE GLYCOL: CPT

## 2020-03-30 PROCEDURE — 2500000003 HC RX 250 WO HCPCS: Performed by: INTERNAL MEDICINE

## 2020-03-30 PROCEDURE — 36415 COLL VENOUS BLD VENIPUNCTURE: CPT

## 2020-03-30 PROCEDURE — 80048 BASIC METABOLIC PNL TOTAL CA: CPT

## 2020-03-30 PROCEDURE — 2580000003 HC RX 258: Performed by: INTERNAL MEDICINE

## 2020-03-30 PROCEDURE — 90935 HEMODIALYSIS ONE EVALUATION: CPT

## 2020-03-30 PROCEDURE — 2500000003 HC RX 250 WO HCPCS: Performed by: NURSE PRACTITIONER

## 2020-03-30 PROCEDURE — 2140000000 HC CCU INTERMEDIATE R&B

## 2020-03-30 PROCEDURE — 6360000002 HC RX W HCPCS: Performed by: INTERNAL MEDICINE

## 2020-03-30 PROCEDURE — 83930 ASSAY OF BLOOD OSMOLALITY: CPT

## 2020-03-30 PROCEDURE — 97165 OT EVAL LOW COMPLEX 30 MIN: CPT

## 2020-03-30 RX ORDER — FOLIC ACID 5 MG/ML
50 INJECTION, SOLUTION INTRAMUSCULAR; INTRAVENOUS; SUBCUTANEOUS EVERY 6 HOURS
Status: DISCONTINUED | OUTPATIENT
Start: 2020-03-30 | End: 2020-03-30 | Stop reason: SDUPTHER

## 2020-03-30 RX ADMIN — DONEPEZIL HYDROCHLORIDE 5 MG: 5 TABLET, FILM COATED ORAL at 23:19

## 2020-03-30 RX ADMIN — Medication 10 ML: at 23:20

## 2020-03-30 RX ADMIN — LEVOTHYROXINE SODIUM 50 MCG: 0.05 TABLET ORAL at 05:55

## 2020-03-30 RX ADMIN — ARIPIPRAZOLE 2 MG: 2 TABLET ORAL at 09:25

## 2020-03-30 RX ADMIN — Medication 10 ML: at 09:25

## 2020-03-30 RX ADMIN — VENLAFAXINE HYDROCHLORIDE 37.5 MG: 37.5 CAPSULE, EXTENDED RELEASE ORAL at 09:25

## 2020-03-30 RX ADMIN — FOLIC ACID: 5 INJECTION, SOLUTION INTRAMUSCULAR; INTRAVENOUS; SUBCUTANEOUS at 17:57

## 2020-03-30 RX ADMIN — THIAMINE HYDROCHLORIDE 100 MG: 100 INJECTION, SOLUTION INTRAMUSCULAR; INTRAVENOUS at 17:11

## 2020-03-30 RX ADMIN — THIAMINE HYDROCHLORIDE 100 MG: 100 INJECTION, SOLUTION INTRAMUSCULAR; INTRAVENOUS at 05:55

## 2020-03-30 RX ADMIN — FOMEPIZOLE 880 MG: 1 INJECTION, SOLUTION INTRAVENOUS at 11:02

## 2020-03-30 RX ADMIN — FOLIC ACID: 5 INJECTION, SOLUTION INTRAMUSCULAR; INTRAVENOUS; SUBCUTANEOUS at 10:58

## 2020-03-30 RX ADMIN — ENOXAPARIN SODIUM 40 MG: 40 INJECTION SUBCUTANEOUS at 09:24

## 2020-03-30 RX ADMIN — SODIUM BICARBONATE: 84 INJECTION, SOLUTION INTRAVENOUS at 02:47

## 2020-03-30 RX ADMIN — FOLIC ACID: 5 INJECTION, SOLUTION INTRAMUSCULAR; INTRAVENOUS; SUBCUTANEOUS at 23:52

## 2020-03-30 ASSESSMENT — PAIN SCALES - GENERAL
PAINLEVEL_OUTOF10: 0

## 2020-03-30 NOTE — PROGRESS NOTES
During patient assessment the patient states he is not currently having suicidal ideation. CO still currently at bedside for continued need. details…

## 2020-03-30 NOTE — PROGRESS NOTES
Chief Complaint:  No chief complaint on file. Drug overdoses, intentional self-harm, initial encounter [facility])     Subjective:    Patient currently has no complaints. No chest pain, shortness of breath, abdominal pain     Objective:    /78   Pulse 91   Temp 98.2 °F (36.8 °C) (Oral)   Resp 18   Ht 6' 2\" (1.88 m)   Wt 188 lb 6.4 oz (85.5 kg)   SpO2 94%   BMI 24.19 kg/m²     Current medications that patient is taking have been reviewed. General appearance: NAD, conversant  HEENT: AT/NC, MMM  Neck: FROM, supple  Lungs: Clear to auscultation  CV: RRR, no MRGs  Abdomen: Soft, non-tender; no masses or HSM, +BS  Extremities: No peripheral edema or digital cyanosis. R HD groin line in place loosely covered with 2x2  Skin: no rash, lesions or ulcers  Psych: Calm and cooperative  Neuro: Alert and interactive, nonfocal    Labs:  CBC:   Lab Results   Component Value Date    WBC 8.7 03/30/2020    RBC 4.26 03/30/2020    HGB 12.8 03/30/2020    HCT 40.0 03/30/2020    MCV 93.9 03/30/2020    MCH 30.0 03/30/2020    MCHC 32.0 03/30/2020    RDW 13.8 03/30/2020     03/30/2020    MPV 9.7 03/30/2020     BMP:    Lab Results   Component Value Date     03/30/2020    K 3.6 03/30/2020    K 3.6 03/29/2020     03/30/2020    CO2 31 03/30/2020    BUN 18 03/30/2020    LABALBU 5.0 03/28/2020    LABALBU 3.9 09/14/2011    CREATININE 2.4 03/30/2020    CALCIUM 8.7 03/30/2020    GFRAA 33 03/30/2020    LABGLOM 27 03/30/2020    GLUCOSE 103 03/30/2020    GLUCOSE 105 09/14/2011      Telemetry:  I've personally reviewed the patient's telemetry:  NSR no arrhythmias    Assessment/Plan:  Principal Problem:    Drug overdoses, intentional self-harm, initial encounter ([facility]  Active Problems:    [facility]    Mixed hyperlipidemia    Metabolic acidosis due to ethylene glycol    Ethylene glycol poisoning    DESIREE (acute kidney injury) ([facility]  Resolved Problems:    * No resolved hospital problems.  *

## 2020-03-30 NOTE — PLAN OF CARE
Problem: Suicide risk  Goal: Provide patient with safe environment  Description: Provide patient with safe environment  Outcome: Met This Shift

## 2020-03-30 NOTE — FLOWSHEET NOTE
03/30/20 1601   Vital Signs   BP (!) 142/92   Temp 98.9 °F (37.2 °C)   Pulse 86   Weight 192 lb 3.9 oz (87.2 kg)   Weight Method Bedside scale   Percent Weight Change -0.23   Post-Hemodialysis Assessment   Post-Treatment Procedures Blood returned   Machine Disinfection Process Exterior Machine Disinfection   Rinseback Volume (ml) 300 ml   Total Liters Processed (l/min) 32.1 l/min   Dialyzer Clearance Lightly streaked   Duration of Treatment (minutes) 180 minutes   Heparin amount administered during treatment (units) 0 units   Hemodialysis Intake (ml) 300 ml   Hemodialysis Output (ml) 300 ml   NET Removed (ml) 0 ml   Tolerated Treatment Good   Patient Response to Treatment tolerated well, blood returned, cath pulled post tx, site held, stasis achieved, bandage applied, area soft, non tender, pt instructed to call for help if area wet, not to put pressure on legs

## 2020-03-30 NOTE — PROGRESS NOTES
Nephrology Attending   Inpatient Progress Note    Admit Date: 3/28/2020                                  PCP: Goldie Yi MD    Patient Active Problem List   Diagnosis    Hypertriglyceridemia    [facility] ([facility]    [facility] of [facility], [facility]    Suicidal ideation    Mixed hyperlipidemia    [facility] by [facility]    Metabolic acidosis due to ethylene glycol    Ethylene glycol poisoning    Drug overdoses, intentional self-harm, [facility]    DESIREE (acute kidney injury) ([facility]       Subjective:      3/29: Patient followed by our group at [facility] for intentional antifreeze ingestion; transferred to [facility] due to need for  Vascular Surgery for dialysis access insertion; emergent HD performed ; seen during HD he gives no c/o    3/30/2020- awake and alert. Eating lunch. For HD today and removal of temp cath. Vitals:  VITALS:  /79   Pulse 83   Temp 98.2 °F (36.8 °C) (Oral)   Resp 16   Ht 6' 2\" (1.88 m)   Wt 188 lb 6.4 oz (85.5 kg)   SpO2 94%   BMI 24.19 kg/m²   24HR INTAKE/OUTPUT:      Intake/Output Summary (Last 24 hours) at 3/30/2020 1157  Last data filed at 3/30/2020 0754  Gross per 24 hour   Intake 840 ml   Output 0 ml   Net 840 ml     CURRENT PULSE OXIMETRY:  SpO2: 94 %  24HR PULSE OXIMETRY RANGE:  SpO2  Av %  Min: 94 %  Max: 96 %        I/O:      I/O last 3 completed shifts:   In: 1500 [P.O.:600]  Out: 900   I/O this shift:  In: 240 [P.O.:240]  Out: -     Medications:    IV:   IV infusion builder 100 mL/hr at 20 0247      IVPB builder   Intravenous Q6H    thiamine  100 mg Intravenous Q8H    enoxaparin  40 mg Subcutaneous Daily    sodium chloride flush  10 mL Intravenous 2 times per day    ARIPiprazole  2 mg Oral Daily    donepezil  5 mg Oral Nightly    levothyroxine  50 mcg Oral Daily    venlafaxine  37.5 mg Oral Daily        Current Meds:  Current seen and examined agree with above  For hd today then removal of hd catheter  Eston Freed

## 2020-03-30 NOTE — PROGRESS NOTES
Physical Therapy  Initial Assessment     Name: Mavis Petty  : 1950  MRN: [id number]    Date of Service: 3/30/2020    Referring Provider: RITA Eldridge CNP    Evaluating PT:  Frandy Oshea PT, DPYOLANDA, [id number]    Room #:  0244/1376-I  Diagnosis:  Drug OD, intentional self harm   Precautions:  Falls, suicide precautions, sitter   Equipment Needs:  none    Pt lives with wife in a 1 story home with 3 stair(s) to enter and 1 rail(s). Bed is on 1st floor and bath is on 1st floor. Pt ambulated with no AD PTA. Pt independent for ADL performance. OBJECTIVE:   Initial Evaluation  Date:    AM-PAC 6 Clicks 60/24   Was pt agreeable to Eval/treatment? Yes    Does pt have pain? Denies    Bed Mobility  Rolling: NT  Supine to sit: Independent  Sit to supine: Independent  Scooting: Independent     Transfers Sit to stand: Independent   Stand to sit: Independent  Stand pivot: NT   Ambulation    250 feet with no AD Independent       Stair negotiation: ascended and descended  NT   ROM BUE:  See OT eval   BLE:  WFL   Strength BUE:  See OT eval   BLE:  5/5   Balance Sitting EOB:  Independent  Dynamic Standing:  Independent     -Pt is A & O x 4  -Sensation:  Unremarkable   -Edema:  Unremarkable     Therapeutic Exercises:  Functional activity    Patient education  Pt educated on safety, sequencing of transfers, and role of PT    Patient response to education:   Pt verbalized understanding Pt demonstrated skill Pt requires further education in this area   Yes  Yes  No      ASSESSMENT:    Treatment:  Patient practiced and was instructed in the following treatment:  Patient education provided continuously throughout session for sequencing, safety maintenance, and improving any deficits found during the evaluation. Additional Comments:  Pt supine in bed upon entry to room. Pt independent with all functional transfers and mobility. Ambulated 250 feet with no AD. Normal gait pattern and speed noted.  Returned

## 2020-03-31 LAB
ANION GAP SERPL CALCULATED.3IONS-SCNC: 14 MMOL/L (ref 7–16)
BASOPHILS ABSOLUTE: 0.03 E9/L (ref 0–0.2)
BASOPHILS RELATIVE PERCENT: 0.4 % (ref 0–2)
BUN BLDV-MCNC: 15 MG/DL (ref 8–23)
CALCIUM SERPL-MCNC: 9.1 MG/DL (ref 8.6–10.2)
CHLORIDE BLD-SCNC: 104 MMOL/L (ref 98–107)
CO2: 28 MMOL/L (ref 22–29)
CREAT SERPL-MCNC: 2.4 MG/DL (ref 0.7–1.2)
EOSINOPHILS ABSOLUTE: 0.21 E9/L (ref 0.05–0.5)
EOSINOPHILS RELATIVE PERCENT: 2.8 % (ref 0–6)
GFR AFRICAN AMERICAN: 33
GFR NON-AFRICAN AMERICAN: 27 ML/MIN/1.73
GLUCOSE BLD-MCNC: 90 MG/DL (ref 74–99)
HCT VFR BLD CALC: 38.6 % (ref 37–54)
HEMOGLOBIN: 12.3 G/DL (ref 12.5–16.5)
IMMATURE GRANULOCYTES #: 0.02 E9/L
IMMATURE GRANULOCYTES %: 0.3 % (ref 0–5)
LYMPHOCYTES ABSOLUTE: 1.25 E9/L (ref 1.5–4)
LYMPHOCYTES RELATIVE PERCENT: 16.8 % (ref 20–42)
MCH RBC QN AUTO: 29.9 PG (ref 26–35)
MCHC RBC AUTO-ENTMCNC: 31.9 % (ref 32–34.5)
MCV RBC AUTO: 93.9 FL (ref 80–99.9)
MONOCYTES ABSOLUTE: 1.04 E9/L (ref 0.1–0.95)
MONOCYTES RELATIVE PERCENT: 14 % (ref 2–12)
NEUTROPHILS ABSOLUTE: 4.89 E9/L (ref 1.8–7.3)
NEUTROPHILS RELATIVE PERCENT: 65.7 % (ref 43–80)
PDW BLD-RTO: 13.7 FL (ref 11.5–15)
PLATELET # BLD: 134 E9/L (ref 130–450)
PMV BLD AUTO: 10.1 FL (ref 7–12)
POTASSIUM SERPL-SCNC: 3.8 MMOL/L (ref 3.5–5)
RBC # BLD: 4.11 E12/L (ref 3.8–5.8)
SODIUM BLD-SCNC: 146 MMOL/L (ref 132–146)
WBC # BLD: 7.4 E9/L (ref 4.5–11.5)

## 2020-03-31 PROCEDURE — 2580000003 HC RX 258: Performed by: INTERNAL MEDICINE

## 2020-03-31 PROCEDURE — 36415 COLL VENOUS BLD VENIPUNCTURE: CPT

## 2020-03-31 PROCEDURE — 2500000003 HC RX 250 WO HCPCS: Performed by: INTERNAL MEDICINE

## 2020-03-31 PROCEDURE — 6360000002 HC RX W HCPCS: Performed by: INTERNAL MEDICINE

## 2020-03-31 PROCEDURE — 6360000002 HC RX W HCPCS: Performed by: NURSE PRACTITIONER

## 2020-03-31 PROCEDURE — 6370000000 HC RX 637 (ALT 250 FOR IP): Performed by: NURSE PRACTITIONER

## 2020-03-31 PROCEDURE — 80048 BASIC METABOLIC PNL TOTAL CA: CPT

## 2020-03-31 PROCEDURE — 85025 COMPLETE CBC W/AUTO DIFF WBC: CPT

## 2020-03-31 PROCEDURE — 2140000000 HC CCU INTERMEDIATE R&B

## 2020-03-31 PROCEDURE — 5A1D70Z PERFORMANCE OF URINARY FILTRATION, INTERMITTENT, LESS THAN 6 HOURS PER DAY: ICD-10-PCS | Performed by: INTERNAL MEDICINE

## 2020-03-31 PROCEDURE — 2580000003 HC RX 258: Performed by: NURSE PRACTITIONER

## 2020-03-31 RX ADMIN — ENOXAPARIN SODIUM 40 MG: 40 INJECTION SUBCUTANEOUS at 09:04

## 2020-03-31 RX ADMIN — THIAMINE HYDROCHLORIDE 100 MG: 100 INJECTION, SOLUTION INTRAMUSCULAR; INTRAVENOUS at 14:58

## 2020-03-31 RX ADMIN — ARIPIPRAZOLE 2 MG: 2 TABLET ORAL at 09:04

## 2020-03-31 RX ADMIN — MAGNESIUM HYDROXIDE 30 ML: 2400 SUSPENSION ORAL at 13:36

## 2020-03-31 RX ADMIN — THIAMINE HYDROCHLORIDE 100 MG: 100 INJECTION, SOLUTION INTRAMUSCULAR; INTRAVENOUS at 00:23

## 2020-03-31 RX ADMIN — Medication 10 ML: at 09:05

## 2020-03-31 RX ADMIN — THIAMINE HYDROCHLORIDE 100 MG: 100 INJECTION, SOLUTION INTRAMUSCULAR; INTRAVENOUS at 09:04

## 2020-03-31 RX ADMIN — FOLIC ACID: 5 INJECTION, SOLUTION INTRAMUSCULAR; INTRAVENOUS; SUBCUTANEOUS at 05:32

## 2020-03-31 RX ADMIN — VENLAFAXINE HYDROCHLORIDE 37.5 MG: 37.5 CAPSULE, EXTENDED RELEASE ORAL at 09:04

## 2020-03-31 RX ADMIN — THIAMINE HYDROCHLORIDE 100 MG: 100 INJECTION, SOLUTION INTRAMUSCULAR; INTRAVENOUS at 21:32

## 2020-03-31 RX ADMIN — LEVOTHYROXINE SODIUM 50 MCG: 0.05 TABLET ORAL at 05:31

## 2020-03-31 RX ADMIN — Medication 10 ML: at 21:32

## 2020-03-31 RX ADMIN — SODIUM CHLORIDE, PRESERVATIVE FREE 10 ML: 5 INJECTION INTRAVENOUS at 14:58

## 2020-03-31 RX ADMIN — DONEPEZIL HYDROCHLORIDE 5 MG: 5 TABLET, FILM COATED ORAL at 21:32

## 2020-03-31 ASSESSMENT — PAIN SCALES - GENERAL
PAINLEVEL_OUTOF10: 0

## 2020-03-31 NOTE — PROGRESS NOTES
Patient currently calm and in bed. Patient states not currently experiencing suicidal thoughts. CO present at bedside. Will continue to monitor.

## 2020-03-31 NOTE — PLAN OF CARE
Problem: Suicide risk  Goal: Provide patient with safe environment  Description: Provide patient with safe environment  3/30/2020 2321 by Dario Parks RN  Outcome: Ongoing  3/30/2020 1730 by Serafin Ibarra RN  Outcome: Met This Shift

## 2020-03-31 NOTE — PLAN OF CARE
Problem: Suicide risk  Goal: Provide patient with safe environment  Description: Provide patient with safe environment  3/31/2020 1227 by Patti Marin RN  Outcome: Met This Shift

## 2020-03-31 NOTE — PROGRESS NOTES
Nephrology Attending   Inpatient Progress Note    Admit Date: 3/28/2020                                  PCP: Hossein Petit MD    Patient Active Problem List   Diagnosis    Hypertriglyceridemia    Recurrent major depressive disorder ([facility]    Severe episode of recurrent major depressive disorder, without psychotic features [facility]    Suicidal ideation    Mixed hyperlipidemia    Su[facility] by [facility] overdose [facility]    Metabolic acidosis due to ethylene glycol    Ethylene glycol poisoning    Drug overdoses, intentional self-harm, initial encounter ([facility]    DESIREE (acute kidney injury) ([facility]       Subjective:      3/29: Patient followed by our group at [facility] for intentional antifreeze ingestion; transferred to [facility] due to need for  Vascular Surgery for dialysis access insertion; emergent HD performed ; seen during HD he gives no c/o    3/30/2020- awake and alert. Eating lunch. For HD today and removal of temp cath. 3/31/2020- no changes overnight    Vitals:  VITALS:  /64   Pulse 87   Temp 98.1 °F (36.7 °C) (Temporal)   Resp 14   Ht 6' 2\" (1.88 m)   Wt 186 lb 6.4 oz (84.6 kg)   SpO2 94%   BMI 23.93 kg/m²   24HR INTAKE/OUTPUT:      Intake/Output Summary (Last 24 hours) at 3/31/2020 0935  Last data filed at 3/31/2020 0539  Gross per 24 hour   Intake 1380 ml   Output 800 ml   Net 580 ml     CURRENT PULSE OXIMETRY:  SpO2: 94 %  24HR PULSE OXIMETRY RANGE:  SpO2  Av.7 %  Min: 94 %  Max: 96 %        I/O:      I/O last 3 completed shifts: In: 5720 [P.O.:1320]  Out: 800 [Urine:500]  No intake/output data recorded.     Medications:    IV:     thiamine  100 mg Intravenous Q8H    enoxaparin  40 mg Subcutaneous Daily    sodium chloride flush  10 mL Intravenous 2 times per day    ARIPiprazole  2 mg Oral Daily    donepezil  5 mg Oral Nightly    levothyroxine  50 mcg Oral Daily    venlafaxine  37.5 mg Oral Daily        Current Meds:  Current Facility-Administered Medications   Medication Dose Route Frequency Provider Last Rate Last Dose    thiamine (B-1) injection 100 mg  100 mg Intravenous Q8H Karen Joya MD   100 mg at 03/31/20 0904    acetaminophen (TYLENOL) tablet 650 mg  650 mg Oral Q6H PRN Kelli Velez, RITA - CNP        Or    acetaminophen (TYLENOL) suppository 650 mg  650 mg Rectal Q6H PRN Cyndi Orourke, APRN - CHARLA        enoxaparin (LOVENOX) injection 40 mg  40 mg Subcutaneous Daily Cyndi Orourke, RITA - CNP   40 mg at 03/31/20 0904    magnesium hydroxide (MILK OF MAGNESIA) 400 MG/5ML suspension 30 mL  30 mL Oral Daily PRN Cyndi Orourke, RITA - CHARLA        promethazine (PHENERGAN) tablet 12.5 mg  12.5 mg Oral Q6H PRN Cyndi Orourke, RITA - CNP        Or    ondansetron (ZOFRAN) injection 4 mg  4 mg Intravenous Q6H PRN Pilar Orourke, APRN - CNP        sodium chloride flush 0.9 % injection 10 mL  10 mL Intravenous 2 times per day Kelli Velez, APRN - CNP   10 mL at 03/31/20 0905    sodium chloride flush 0.9 % injection 10 mL  10 mL Intravenous PRN Pilar Orourke, APRN - CNP        ARIPiprazole (ABILIFY) tablet 2 mg  2 mg Oral Daily Cyndi Orourke, APRN - CNP   2 mg at 03/31/20 2401    donepezil (ARICEPT) tablet 5 mg  5 mg Oral Nightly Cyndi Orourke APRN - CNP   5 mg at 03/30/20 2319    levothyroxine (SYNTHROID) tablet 50 mcg  50 mcg Oral Daily Pilar Orourke, APRN - CNP   50 mcg at 03/31/20 0531    venlafaxine (EFFEXOR XR) extended release capsule 37.5 mg  37.5 mg Oral Daily Pilar Orourke, APRN - CNP   37.5 mg at 03/31/20 7787       Diet:  DIET GENERAL; Safety Tray; Safety Tray (Disposables)     EXAM:  General: No distress. Alert. Eyes: PERRL. No sclera icterus. No conjunctival injection. ENT: No discharge. Pharynx clear. Neck: Trachea midline. Normal thyroid. Lungs: clear with equal expansion bilaterally   CV:  S1 S2 no s3 or rub   Abd: Non-tender. Non-distended. No masses. No organmegaly. Normal bowel sounds. Skin: Warm and dry. No nodule on exposed extremities. No rash on exposed extremities. Ext: No cyanosis, clubbing, edema ; temporary HD cath in R CFV  Neuro: Awake. Follows commands. Positive pupils/gag/corneals. Normal pain response. Results:   CBC:   Recent Labs     03/29/20  0930 03/30/20  0524 03/31/20  0432   WBC 11.2 8.7 7.4   HGB 13.2 12.8 12.3*    141 134      BMP:    Recent Labs     03/29/20  1211 03/30/20  0524 03/31/20  0432    145 146   K 3.6 3.6 3.8    103 104   CO2 27 31* 28   BUN 16 18 15   CREATININE 2.0* 2.4* 2.4*   GLUCOSE 116* 103* 90       Hepatic:   Recent Labs     03/28/20  1000   AST 25   ALT 16   BILITOT 0.3   ALKPHOS 128      Troponin: No results for input(s): TROPONINI in the last 72 hours. BNP: No results for input(s): BNP in the last 72 hours. Lipids: No results for input(s): CHOL, HDL in the last 72 hours. Invalid input(s): LDLCALCU   ABGs:   Lab Results   Component Value Date    PO2ART 92.4 03/28/2020    [id number] 32.7 03/28/2020      INR:   Recent Labs     03/28/20  1425   INR 0.9         Assessment/Plans    1. DESIREE  Stage 1  due to ethylene glycol toxicity  nonoliguric  Ethylene glycol levels ordered pre and post HD- pending from 3/30  Hemodialysis 3/29 and 3/30, temp line removed 3/30  fomepizole, continue until level available  Scr remains above baseline. 2. HAG metabolic acidosis with osmolar gap  strongly suggestive of toxic alcohol ingestion  CO2 31--> 28, off  IV bicarb drip       3. Hypertension   controlled not requiring any antihypertensive now    4.  Attempted suicide  Psych consult  transfer once medically stable      RITA Cohn - CNP     Pt seen and examined agree with above  Temp hd line removed  [place]

## 2020-03-31 NOTE — PROGRESS NOTES
Chief Complaint:  No chief complaint on file. Drug overdoses, intentional self-harm, initial encounter [facility])     Subjective:    Patient currently has no complaints. No chest pain, shortness of breath, abdominal pain     Objective:    /64   Pulse 87   Temp 98.1 °F (36.7 °C) (Temporal)   Resp 14   Ht 6' 2\" (1.88 m)   Wt 186 lb 6.4 oz (84.6 kg)   SpO2 94%   BMI 23.93 kg/m²     Current medications that patient is taking have been reviewed. General appearance: NAD, conversant  HEENT: AT/NC, MMM  Neck: FROM, supple  Lungs: Clear to auscultation  CV: RRR, no MRGs  Abdomen: Soft, non-tender; no masses or HSM, +BS  Extremities: No peripheral edema or digital cyanosis. R HD groin line has been removed  Skin: no rash, lesions or ulcers  Psych: Calm and cooperative  Neuro: Alert and interactive, nonfocal    Labs:  CBC:   Lab Results   Component Value Date    WBC 7.4 03/31/2020    RBC 4.11 03/31/2020    HGB 12.3 03/31/2020    HCT 38.6 03/31/2020    MCV 93.9 03/31/2020    MCH 29.9 03/31/2020    MCHC 31.9 03/31/2020    RDW 13.7 03/31/2020     03/31/2020    MPV 10.1 03/31/2020     BMP:    Lab Results   Component Value Date     03/31/2020    K 3.8 03/31/2020    K 3.6 03/29/2020     03/31/2020    CO2 28 03/31/2020    BUN 15 03/31/2020    LABALBU 5.0 03/28/2020    LABALBU 3.9 09/14/2011    CREATININE 2.4 03/31/2020    CALCIUM 9.1 03/31/2020    GFRAA 33 03/31/2020    LABGLOM 27 03/31/2020    GLUCOSE 90 03/31/2020    GLUCOSE 105 09/14/2011        Assessment/Plan:  Principal Problem:    Drug overdoses, intentional self-harm, initial encounter ([facility]  Active Problems:    Recurrent major depressive disorder ([facility]    Mixed hyperlipidemia    Metabolic acidosis due to ethylene glycol    Ethylene glycol poisoning    DESIREE (acute kidney injury) ([facility]  Resolved Problems:    * No resolved hospital problems.  *     Repeat ethylene glycol level undetectable  Stop fomepizole and high dose folic acid  Cr stable;

## 2020-04-01 LAB
ANION GAP SERPL CALCULATED.3IONS-SCNC: 11 MMOL/L (ref 7–16)
BUN BLDV-MCNC: 20 MG/DL (ref 8–23)
CALCIUM SERPL-MCNC: 9.3 MG/DL (ref 8.6–10.2)
CHLORIDE BLD-SCNC: 101 MMOL/L (ref 98–107)
CO2: 29 MMOL/L (ref 22–29)
CREAT SERPL-MCNC: 2.9 MG/DL (ref 0.7–1.2)
GFR AFRICAN AMERICAN: 26
GFR NON-AFRICAN AMERICAN: 22 ML/MIN/1.73
GLUCOSE BLD-MCNC: 96 MG/DL (ref 74–99)
POTASSIUM SERPL-SCNC: 4.2 MMOL/L (ref 3.5–5)
SODIUM BLD-SCNC: 141 MMOL/L (ref 132–146)

## 2020-04-01 PROCEDURE — 80048 BASIC METABOLIC PNL TOTAL CA: CPT

## 2020-04-01 PROCEDURE — 2580000003 HC RX 258: Performed by: NURSE PRACTITIONER

## 2020-04-01 PROCEDURE — 6360000002 HC RX W HCPCS: Performed by: INTERNAL MEDICINE

## 2020-04-01 PROCEDURE — 6370000000 HC RX 637 (ALT 250 FOR IP): Performed by: NURSE PRACTITIONER

## 2020-04-01 PROCEDURE — 2140000000 HC CCU INTERMEDIATE R&B

## 2020-04-01 PROCEDURE — 36415 COLL VENOUS BLD VENIPUNCTURE: CPT

## 2020-04-01 PROCEDURE — 6360000002 HC RX W HCPCS: Performed by: NURSE PRACTITIONER

## 2020-04-01 RX ADMIN — SODIUM CHLORIDE, PRESERVATIVE FREE 10 ML: 5 INJECTION INTRAVENOUS at 13:45

## 2020-04-01 RX ADMIN — THIAMINE HYDROCHLORIDE 100 MG: 100 INJECTION, SOLUTION INTRAMUSCULAR; INTRAVENOUS at 06:11

## 2020-04-01 RX ADMIN — THIAMINE HYDROCHLORIDE 100 MG: 100 INJECTION, SOLUTION INTRAMUSCULAR; INTRAVENOUS at 22:54

## 2020-04-01 RX ADMIN — Medication 10 ML: at 21:06

## 2020-04-01 RX ADMIN — VENLAFAXINE HYDROCHLORIDE 37.5 MG: 37.5 CAPSULE, EXTENDED RELEASE ORAL at 09:51

## 2020-04-01 RX ADMIN — DONEPEZIL HYDROCHLORIDE 5 MG: 5 TABLET, FILM COATED ORAL at 21:06

## 2020-04-01 RX ADMIN — ENOXAPARIN SODIUM 40 MG: 40 INJECTION SUBCUTANEOUS at 09:51

## 2020-04-01 RX ADMIN — THIAMINE HYDROCHLORIDE 100 MG: 100 INJECTION, SOLUTION INTRAMUSCULAR; INTRAVENOUS at 13:45

## 2020-04-01 RX ADMIN — LEVOTHYROXINE SODIUM 50 MCG: 0.05 TABLET ORAL at 06:10

## 2020-04-01 RX ADMIN — Medication 10 ML: at 09:51

## 2020-04-01 RX ADMIN — ARIPIPRAZOLE 2 MG: 2 TABLET ORAL at 09:51

## 2020-04-01 RX ADMIN — SODIUM CHLORIDE, PRESERVATIVE FREE 10 ML: 5 INJECTION INTRAVENOUS at 06:11

## 2020-04-01 ASSESSMENT — PAIN SCALES - GENERAL
PAINLEVEL_OUTOF10: 0

## 2020-04-01 NOTE — PROGRESS NOTES
A&Ox4, denies pain. Denies any suicidal ideation at this time, suicide precautions continues along w/ constant observer at bedside. Assessment/VS as documented. Will continue to monitor.  Electronically signed by Brandy Drew RN on 3/31/2020 at 9:44 PM

## 2020-04-01 NOTE — PROGRESS NOTES
Chief Complaint:  No chief complaint on file. Drug overdoses, intentional self-harm, initial encounter [facility])     Subjective:    Patient currently has no complaints. No chest pain, shortness of breath, abdominal pain     Objective:    /89   Pulse 83   Temp 97.9 °F (36.6 °C) (Oral)   Resp 16   Ht 6' 2\" (1.88 m)   Wt 184 lb 6 oz (83.6 kg)   SpO2 94%   BMI 23.67 kg/m²     Current medications that patient is taking have been reviewed. General appearance: NAD, conversant  HEENT: AT/NC, MMM  Neck: FROM, supple  Lungs: Clear to auscultation  CV: RRR, no MRGs  Abdomen: Soft, non-tender; no masses or HSM, +BS  Extremities: No peripheral edema or digital cyanosis. R HD groin line has been removed  Skin: no rash, lesions or ulcers  Psych: Calm and cooperative  Neuro: Alert and interactive, nonfocal    Labs:  CBC:   Lab Results   Component Value Date    WBC 7.4 03/31/2020    RBC 4.11 03/31/2020    HGB 12.3 03/31/2020    HCT 38.6 03/31/2020    MCV 93.9 03/31/2020    MCH 29.9 03/31/2020    MCHC 31.9 03/31/2020    RDW 13.7 03/31/2020     03/31/2020    MPV 10.1 03/31/2020     BMP:    Lab Results   Component Value Date     04/01/2020    K 4.2 04/01/2020    K 3.6 03/29/2020     04/01/2020    CO2 29 04/01/2020    BUN 20 04/01/2020    LABALBU 5.0 03/28/2020    LABALBU 3.9 09/14/2011    CREATININE 2.9 04/01/2020    CALCIUM 9.3 04/01/2020    GFRAA 26 04/01/2020    LABGLOM 22 04/01/2020    GLUCOSE 96 04/01/2020    GLUCOSE 105 09/14/2011        Assessment/Plan:  Principal Problem:    Drug overdoses, intentional self-harm, initial encounter ([facility] [address]  Active Problems:    Recurrent major depressive disorder ([facility] [address]    Mixed hyperlipidemia    Metabolic acidosis due to ethylene glycol    Ethylene glycol poisoning    DESIREE (acute kidney injury) ([facility] [address]  Resolved Problems:    * No resolved hospital problems.  *     Repeat ethylene glycol level undetectable  S/p treatment fomepizole and high dose folic acid  Cr worsening; continue to monitor. No major electrolyte derangements or immediate indications for dialysis  Appreciate Psych consult for suicide attempt    Requires continued inpatient level of care.   Cr needs to be relatively stable before he is cleared for psych  Angeline Moore    8:35 AM  4/1/2020  Cell: [phone]

## 2020-04-01 NOTE — PLAN OF CARE
Problem: Suicide risk  Description: Suicide risk  Goal: Provide patient with safe environment  Description: Provide patient with safe environment  4/1/2020 1006 by Elaine Mathew RN  Outcome: Met This Shift       Problem: Musculor/Skeletal Functional Status  Goal: Highest potential functional level  4/1/2020 1006 by Elaine Mathew RN  Outcome: Met This Shift    Goal: Absence of falls  4/1/2020 1006 by Elaine Mathew RN  Outcome: Met This Shift       Problem: Falls - Risk of:  Goal: Will remain free from falls  Description: Will remain free from falls  4/1/2020 1006 by Elaine Mathew RN  Outcome: Met This Shift    Goal: Absence of physical injury  Description: Absence of physical injury  4/1/2020 1006 by Elaine Mathew RN  Outcome: Met This Shift

## 2020-04-01 NOTE — PROGRESS NOTES
A&Ox4, denies pain. Denies any suicidal ideation at this time, suicide precautions continues along w/ constant observer at bedside. Assessment/VS as documented. Will continue to monitor.

## 2020-04-02 ENCOUNTER — HOSPITAL ENCOUNTER (INPATIENT)
Age: 70
LOS: 7 days | Discharge: HOME | DRG: 918 | End: 2020-04-09
Attending: PSYCHIATRY & NEUROLOGY | Admitting: PSYCHIATRY & NEUROLOGY
Payer: COMMERCIAL

## 2020-04-02 VITALS
HEIGHT: 74 IN | HEART RATE: 72 BPM | DIASTOLIC BLOOD PRESSURE: 80 MMHG | SYSTOLIC BLOOD PRESSURE: 128 MMHG | TEMPERATURE: 97.3 F | RESPIRATION RATE: 16 BRPM | WEIGHT: 185 LBS | OXYGEN SATURATION: 96 % | BODY MASS INDEX: 23.74 KG/M2

## 2020-04-02 PROBLEM — F33.9 MAJOR DEPRESSIVE DISORDER, RECURRENT (HCC): Status: ACTIVE | Noted: 2020-04-02

## 2020-04-02 LAB
ANION GAP SERPL CALCULATED.3IONS-SCNC: 15 MMOL/L (ref 7–16)
BUN BLDV-MCNC: 21 MG/DL (ref 8–23)
CALCIUM SERPL-MCNC: 8.9 MG/DL (ref 8.6–10.2)
CHLORIDE BLD-SCNC: 103 MMOL/L (ref 98–107)
CO2: 25 MMOL/L (ref 22–29)
CREAT SERPL-MCNC: 2.7 MG/DL (ref 0.7–1.2)
GFR AFRICAN AMERICAN: 28
GFR NON-AFRICAN AMERICAN: 23 ML/MIN/1.73
GLUCOSE BLD-MCNC: 87 MG/DL (ref 74–99)
POTASSIUM SERPL-SCNC: 3.9 MMOL/L (ref 3.5–5)
REPORT: NORMAL
SODIUM BLD-SCNC: 143 MMOL/L (ref 132–146)

## 2020-04-02 PROCEDURE — 80048 BASIC METABOLIC PNL TOTAL CA: CPT

## 2020-04-02 PROCEDURE — 6370000000 HC RX 637 (ALT 250 FOR IP): Performed by: NURSE PRACTITIONER

## 2020-04-02 PROCEDURE — 1240000000 HC EMOTIONAL WELLNESS R&B

## 2020-04-02 PROCEDURE — 36415 COLL VENOUS BLD VENIPUNCTURE: CPT

## 2020-04-02 PROCEDURE — 02HV33Z INSERTION OF INFUSION DEVICE INTO SUPERIOR VENA CAVA, PERCUTANEOUS APPROACH: ICD-10-PCS | Performed by: INTERNAL MEDICINE

## 2020-04-02 PROCEDURE — 2580000003 HC RX 258: Performed by: NURSE PRACTITIONER

## 2020-04-02 PROCEDURE — 6360000002 HC RX W HCPCS: Performed by: INTERNAL MEDICINE

## 2020-04-02 PROCEDURE — 6370000000 HC RX 637 (ALT 250 FOR IP): Performed by: INTERNAL MEDICINE

## 2020-04-02 PROCEDURE — 6360000002 HC RX W HCPCS: Performed by: NURSE PRACTITIONER

## 2020-04-02 RX ORDER — VENLAFAXINE HYDROCHLORIDE 37.5 MG/1
37.5 CAPSULE, EXTENDED RELEASE ORAL DAILY
Status: DISCONTINUED | OUTPATIENT
Start: 2020-04-03 | End: 2020-04-03

## 2020-04-02 RX ORDER — SODIUM CHLORIDE 0.9 % (FLUSH) 0.9 %
10 SYRINGE (ML) INJECTION PRN
Status: DISCONTINUED | OUTPATIENT
Start: 2020-04-02 | End: 2020-04-09 | Stop reason: HOSPADM

## 2020-04-02 RX ORDER — VENLAFAXINE HYDROCHLORIDE 37.5 MG/1
37.5 CAPSULE, EXTENDED RELEASE ORAL DAILY
Status: CANCELLED | OUTPATIENT
Start: 2020-04-03

## 2020-04-02 RX ORDER — TRAZODONE HYDROCHLORIDE 50 MG/1
25 TABLET ORAL NIGHTLY PRN
Status: DISCONTINUED | OUTPATIENT
Start: 2020-04-02 | End: 2020-04-09 | Stop reason: HOSPADM

## 2020-04-02 RX ORDER — ONDANSETRON 2 MG/ML
4 INJECTION INTRAMUSCULAR; INTRAVENOUS EVERY 6 HOURS PRN
Status: DISCONTINUED | OUTPATIENT
Start: 2020-04-02 | End: 2020-04-09 | Stop reason: HOSPADM

## 2020-04-02 RX ORDER — PROMETHAZINE HYDROCHLORIDE 25 MG/1
12.5 TABLET ORAL EVERY 6 HOURS PRN
Status: CANCELLED | OUTPATIENT
Start: 2020-04-02

## 2020-04-02 RX ORDER — DONEPEZIL HYDROCHLORIDE 5 MG/1
5 TABLET, FILM COATED ORAL NIGHTLY
Status: DISCONTINUED | OUTPATIENT
Start: 2020-04-02 | End: 2020-04-09 | Stop reason: HOSPADM

## 2020-04-02 RX ORDER — ACETAMINOPHEN 325 MG/1
650 TABLET ORAL EVERY 6 HOURS PRN
Status: CANCELLED | OUTPATIENT
Start: 2020-04-02

## 2020-04-02 RX ORDER — ACETAMINOPHEN 325 MG/1
650 TABLET ORAL EVERY 6 HOURS PRN
Status: DISCONTINUED | OUTPATIENT
Start: 2020-04-02 | End: 2020-04-09 | Stop reason: HOSPADM

## 2020-04-02 RX ORDER — SODIUM CHLORIDE 0.9 % (FLUSH) 0.9 %
10 SYRINGE (ML) INJECTION PRN
Status: CANCELLED | OUTPATIENT
Start: 2020-04-02

## 2020-04-02 RX ORDER — HALOPERIDOL 5 MG/ML
3 INJECTION INTRAMUSCULAR EVERY 6 HOURS PRN
Status: DISCONTINUED | OUTPATIENT
Start: 2020-04-02 | End: 2020-04-09 | Stop reason: HOSPADM

## 2020-04-02 RX ORDER — ACETAMINOPHEN 650 MG/1
650 SUPPOSITORY RECTAL EVERY 6 HOURS PRN
Status: DISCONTINUED | OUTPATIENT
Start: 2020-04-02 | End: 2020-04-09 | Stop reason: HOSPADM

## 2020-04-02 RX ORDER — LEVOTHYROXINE SODIUM 0.05 MG/1
50 TABLET ORAL DAILY
Status: CANCELLED | OUTPATIENT
Start: 2020-04-03

## 2020-04-02 RX ORDER — SODIUM CHLORIDE 0.9 % (FLUSH) 0.9 %
10 SYRINGE (ML) INJECTION EVERY 12 HOURS SCHEDULED
Status: CANCELLED | OUTPATIENT
Start: 2020-04-02

## 2020-04-02 RX ORDER — SODIUM CHLORIDE 0.9 % (FLUSH) 0.9 %
10 SYRINGE (ML) INJECTION EVERY 12 HOURS SCHEDULED
Status: DISCONTINUED | OUTPATIENT
Start: 2020-04-02 | End: 2020-04-09 | Stop reason: HOSPADM

## 2020-04-02 RX ORDER — ACETAMINOPHEN 650 MG/1
650 SUPPOSITORY RECTAL EVERY 6 HOURS PRN
Status: CANCELLED | OUTPATIENT
Start: 2020-04-02

## 2020-04-02 RX ORDER — HALOPERIDOL 2 MG/1
3 TABLET ORAL EVERY 6 HOURS PRN
Status: DISCONTINUED | OUTPATIENT
Start: 2020-04-02 | End: 2020-04-09 | Stop reason: HOSPADM

## 2020-04-02 RX ORDER — LEVOTHYROXINE SODIUM 0.05 MG/1
50 TABLET ORAL DAILY
Status: DISCONTINUED | OUTPATIENT
Start: 2020-04-03 | End: 2020-04-09 | Stop reason: HOSPADM

## 2020-04-02 RX ORDER — ONDANSETRON 2 MG/ML
4 INJECTION INTRAMUSCULAR; INTRAVENOUS EVERY 6 HOURS PRN
Status: CANCELLED | OUTPATIENT
Start: 2020-04-02

## 2020-04-02 RX ORDER — ARIPIPRAZOLE 2 MG/1
2 TABLET ORAL DAILY
Status: DISCONTINUED | OUTPATIENT
Start: 2020-04-03 | End: 2020-04-09 | Stop reason: HOSPADM

## 2020-04-02 RX ORDER — MAGNESIUM HYDROXIDE/ALUMINUM HYDROXICE/SIMETHICONE 120; 1200; 1200 MG/30ML; MG/30ML; MG/30ML
30 SUSPENSION ORAL PRN
Status: DISCONTINUED | OUTPATIENT
Start: 2020-04-02 | End: 2020-04-09 | Stop reason: HOSPADM

## 2020-04-02 RX ORDER — ACETAMINOPHEN 325 MG/1
650 TABLET ORAL EVERY 4 HOURS PRN
Status: DISCONTINUED | OUTPATIENT
Start: 2020-04-02 | End: 2020-04-02 | Stop reason: DRUGHIGH

## 2020-04-02 RX ORDER — ARIPIPRAZOLE 2 MG/1
2 TABLET ORAL DAILY
Status: CANCELLED | OUTPATIENT
Start: 2020-04-03

## 2020-04-02 RX ORDER — PROMETHAZINE HYDROCHLORIDE 25 MG/1
12.5 TABLET ORAL EVERY 6 HOURS PRN
Status: DISCONTINUED | OUTPATIENT
Start: 2020-04-02 | End: 2020-04-09 | Stop reason: HOSPADM

## 2020-04-02 RX ORDER — DONEPEZIL HYDROCHLORIDE 5 MG/1
5 TABLET, FILM COATED ORAL NIGHTLY
Status: CANCELLED | OUTPATIENT
Start: 2020-04-02

## 2020-04-02 RX ADMIN — LEVOTHYROXINE SODIUM 50 MCG: 0.05 TABLET ORAL at 05:17

## 2020-04-02 RX ADMIN — Medication 10 ML: at 08:17

## 2020-04-02 RX ADMIN — ARIPIPRAZOLE 2 MG: 2 TABLET ORAL at 08:16

## 2020-04-02 RX ADMIN — DONEPEZIL HYDROCHLORIDE 5 MG: 5 TABLET, FILM COATED ORAL at 22:48

## 2020-04-02 RX ADMIN — VENLAFAXINE HYDROCHLORIDE 37.5 MG: 37.5 CAPSULE, EXTENDED RELEASE ORAL at 08:16

## 2020-04-02 RX ADMIN — ENOXAPARIN SODIUM 40 MG: 40 INJECTION SUBCUTANEOUS at 08:16

## 2020-04-02 RX ADMIN — SODIUM CHLORIDE, PRESERVATIVE FREE 10 ML: 5 INJECTION INTRAVENOUS at 06:20

## 2020-04-02 RX ADMIN — THIAMINE HYDROCHLORIDE 100 MG: 100 INJECTION, SOLUTION INTRAMUSCULAR; INTRAVENOUS at 06:19

## 2020-04-02 ASSESSMENT — PAIN SCALES - GENERAL
PAINLEVEL_OUTOF10: 0

## 2020-04-02 ASSESSMENT — SLEEP AND FATIGUE QUESTIONNAIRES
DO YOU HAVE DIFFICULTY SLEEPING: NO
DO YOU USE A SLEEP AID: NO
AVERAGE NUMBER OF SLEEP HOURS: 6

## 2020-04-02 ASSESSMENT — LIFESTYLE VARIABLES: HISTORY_ALCOHOL_USE: NO

## 2020-04-02 ASSESSMENT — PATIENT HEALTH QUESTIONNAIRE - PHQ9: SUM OF ALL RESPONSES TO PHQ QUESTIONS 1-9: 20

## 2020-04-02 NOTE — PROGRESS NOTES
 donepezil  5 mg Oral Nightly    levothyroxine  50 mcg Oral Daily    venlafaxine  37.5 mg Oral Daily        Current Meds:  Current Facility-Administered Medications   Medication Dose Route Frequency Provider Last Rate Last Dose    thiamine (B-1) injection 100 mg  100 mg Intravenous Q8H Mendel Hope, MD   100 mg at 04/02/20 3520    acetaminophen (TYLENOL) tablet 650 mg  650 mg Oral Q6H PRN RITA Alvarado - CNP        Or    acetaminophen (TYLENOL) suppository 650 mg  650 mg Rectal Q6H PRN Jeraniyae June Humfleet, APRN - CNP        enoxaparin (LOVENOX) injection 40 mg  40 mg Subcutaneous Daily Jerlene June Humfleet, APRN - CNP   40 mg at 04/02/20 0816    magnesium hydroxide (MILK OF MAGNESIA) 400 MG/5ML suspension 30 mL  30 mL Oral Daily PRN Jerlene June Humfleet, APRN - CNP   30 mL at 03/31/20 1336    promethazine (PHENERGAN) tablet 12.5 mg  12.5 mg Oral Q6H PRN Jeraniyae June Humfleet, APRN - CNP        Or    ondansetron (ZOFRAN) injection 4 mg  4 mg Intravenous Q6H PRN Pilar Orourke, RITA - CNP        sodium chloride flush 0.9 % injection 10 mL  10 mL Intravenous 2 times per day Leonor Alfaro APRN - CNP   10 mL at 04/02/20 0817    sodium chloride flush 0.9 % injection 10 mL  10 mL Intravenous PRN [facility] Humfleet, APRN - CNP   10 mL at 04/02/20 6564    ARIPiprazole (ABILIFY) tablet 2 mg  2 mg Oral Daily Jerlene June Humfleet, APRN - CNP   2 mg at 04/02/20 0816    donepezil (ARICEPT) tablet 5 mg  5 mg Oral Nightly Jerlene June Humfleet, APRN - CNP   5 mg at 04/01/20 2106    levothyroxine (SYNTHROID) tablet 50 mcg  50 mcg Oral Daily Jerlene June Humfleet, APRN - CNP   50 mcg at 04/02/20 0517    venlafaxine (EFFEXOR XR) extended release capsule 37.5 mg  37.5 mg Oral Daily Pilar Orourke, APRN - CNP   37.5 mg at 04/02/20 0259       Diet:  DIET GENERAL; Safety Tray; Safety Tray (Disposables)     EXAM:  General: No distress. Alert. Eyes: PERRL. No sclera icterus.  No

## 2020-04-02 NOTE — PROGRESS NOTES
Nurse to nurse called to [facility] floor. Awaiting discharge order from Dr. Reyes Watson to transfer patient.

## 2020-04-02 NOTE — PROGRESS NOTES
stabilized.   Appreciate Psych consult for suicide attempt    Medically stable for discharge to [facility]    10:45 AM  4/2/2020  Cell: [phone]

## 2020-04-02 NOTE — PROGRESS NOTES
Denies suicidal ideation at this time. A&O x4.  Calm and cooperative. Constant observation continued.

## 2020-04-03 PROBLEM — F33.2 MAJOR DEPRESSIVE DISORDER, RECURRENT SEVERE WITHOUT PSYCHOTIC FEATURES (HCC): Status: ACTIVE | Noted: 2020-04-03

## 2020-04-03 PROCEDURE — 1240000000 HC EMOTIONAL WELLNESS R&B

## 2020-04-03 PROCEDURE — 6370000000 HC RX 637 (ALT 250 FOR IP): Performed by: INTERNAL MEDICINE

## 2020-04-03 PROCEDURE — 99222 1ST HOSP IP/OBS MODERATE 55: CPT | Performed by: NURSE PRACTITIONER

## 2020-04-03 PROCEDURE — 6360000002 HC RX W HCPCS: Performed by: INTERNAL MEDICINE

## 2020-04-03 PROCEDURE — 2580000003 HC RX 258: Performed by: INTERNAL MEDICINE

## 2020-04-03 RX ORDER — VENLAFAXINE HYDROCHLORIDE 75 MG/1
75 CAPSULE, EXTENDED RELEASE ORAL DAILY
Status: DISCONTINUED | OUTPATIENT
Start: 2020-04-04 | End: 2020-04-09 | Stop reason: HOSPADM

## 2020-04-03 RX ORDER — SODIUM CHLORIDE 9 MG/ML
INJECTION, SOLUTION INTRAVENOUS CONTINUOUS
Status: DISCONTINUED | OUTPATIENT
Start: 2020-04-03 | End: 2020-04-07

## 2020-04-03 RX ADMIN — ARIPIPRAZOLE 2 MG: 2 TABLET ORAL at 08:21

## 2020-04-03 RX ADMIN — SODIUM CHLORIDE: 9 INJECTION, SOLUTION INTRAVENOUS at 14:52

## 2020-04-03 RX ADMIN — VENLAFAXINE HYDROCHLORIDE 37.5 MG: 37.5 CAPSULE, EXTENDED RELEASE ORAL at 08:21

## 2020-04-03 RX ADMIN — DONEPEZIL HYDROCHLORIDE 5 MG: 5 TABLET, FILM COATED ORAL at 20:36

## 2020-04-03 RX ADMIN — ENOXAPARIN SODIUM 40 MG: 40 INJECTION SUBCUTANEOUS at 08:22

## 2020-04-03 RX ADMIN — LEVOTHYROXINE SODIUM 50 MCG: 0.05 TABLET ORAL at 06:46

## 2020-04-03 ASSESSMENT — PAIN SCALES - GENERAL
PAINLEVEL_OUTOF10: 0

## 2020-04-03 ASSESSMENT — PATIENT HEALTH QUESTIONNAIRE - PHQ9: SUM OF ALL RESPONSES TO PHQ QUESTIONS 1-9: 12

## 2020-04-03 ASSESSMENT — SLEEP AND FATIGUE QUESTIONNAIRES
DO YOU USE A SLEEP AID: NO
DO YOU HAVE DIFFICULTY SLEEPING: NO

## 2020-04-03 ASSESSMENT — LIFESTYLE VARIABLES: HISTORY_ALCOHOL_USE: YES

## 2020-04-03 ASSESSMENT — PAIN SCALES - WONG BAKER: WONGBAKER_NUMERICALRESPONSE: 0

## 2020-04-03 NOTE — PROGRESS NOTES
Nephrology   Inpatient Progress Note    Admit Date: 2020                                  PCP: Valencia Alvarez MD    Patient Active Problem List   Diagnosis    Hypertriglyceridemia    Recurrent major [facility] disorder ([facility] [address]    Severe episode of recurrent major depressive disorder, without psychotic features ([facility] [address]    Suicidal ideation    Mixed hyperlipidemia    Suicide attempt by substance overdose ([facility] [address]    Metabolic acidosis due to ethylene glycol    Ethylene glycol poisoning    [facility], [facility]    DESIREE (acute kidney injury) ([facility] [address]    Major depressive disorder, recurrent ([facility] [address]       Subjective:      3/29: Patient followed by our group at [facility] for intentional antifreeze ingestion; transferred to [facility] due to need for  Vascular Surgery for dialysis access insertion; emergent HD performed ; seen during HD he gives no c/o    3/30/2020- awake and alert. Eating lunch. For HD today and removal of temp cath. 3/31/2020- no changes overnight    2020- awake and alert. He is in no distress. Advised of labs this am.     2020-Awake and alert, sitter at bedside. No acute distress. Awaiting transfer to inpatient psych    4/3/20- Now admitted to inpatient psych. No new lab results to review. Vitals:  VITALS:  /74   Pulse 83   Temp 98.7 °F (37.1 °C) (Temporal)   Resp 16   Ht 6' 2\" (1.88 m)   Wt 185 lb 12.8 oz (84.3 kg)   SpO2 97%   BMI 23.86 kg/m²   24HR INTAKE/OUTPUT:    No intake or output data in the 24 hours ending 20 1034  CURRENT PULSE OXIMETRY:  SpO2: 97 %  24HR PULSE OXIMETRY RANGE:  SpO2  Av.3 %  Min: 96 %  Max: 99 %        I/O:      No intake/output data recorded. No intake/output data recorded.     Medications:    IV:     enoxaparin  40 mg Subcutaneous Daily    sodium chloride flush  10 mL Intravenous 2 times per day    ARIPiprazole  2 mg Oral Daily    donepezil  5 mg Oral Nightly    levothyroxine  50 mcg Oral Daily    discharge. Pharynx clear. Neck: Trachea midline. Normal thyroid. Lungs: clear with equal expansion bilaterally   CV:  S1 S2 no s3 or rub   Abd: Non-tender. Non-distended. No masses. No organmegaly. Normal bowel sounds. Skin: Warm and dry. No nodule on exposed extremities. No rash on exposed extremities. Ext: No cyanosis, clubbing, edema  Neuro: Awake. Follows commands. Positive pupils/gag/corneals. Normal pain response. Results:   CBC:   No results for input(s): WBC, HGB, PLT in the last 72 hours. BMP:    Recent Labs     04/01/20  0457 04/02/20  0332    143   K 4.2 3.9    103   CO2 29 25   BUN 20 21   CREATININE 2.9* 2.7*   GLUCOSE 96 87       Hepatic:   No results for input(s): AST, ALT, ALB, BILITOT, ALKPHOS in the last 72 hours. Troponin: No results for input(s): TROPONINI in the last 72 hours. BNP: No results for input(s): BNP in the last 72 hours. Lipids: No results for input(s): CHOL, HDL in the last 72 hours. Invalid input(s): LDLCALCU   ABGs:   Lab Results   Component Value Date    PO2ART 92.4 03/28/2020    DZY8XFR 32.7 03/28/2020      INR:   No results for input(s): INR in the last 72 hours. Assessment/Plans    1. DESIREE  Stage 1  Baseline cr 1.1  due to ethylene glycol toxicity  nonoliguric  Ethylene glycol levels ordered pre and post HD  Result from 3/30 negative for ethylene glycol  Hemodialysis 3/29 and 3/30, temp line removed 3/30  Scr remains above baseline- Repeat BMP in AM    2. HAG metabolic acidosis with osmolar gap  strongly suggestive of toxic alcohol ingestion  CO2 31--> 28-->29-->25      3. Hypertension   controlled not requiring any antihypertensive now    4.  Attempted suicide  Psych consult  Now admitted to inpatient psych    RITA Armendariz - BONY

## 2020-04-03 NOTE — PLAN OF CARE
[facility]  Admission Note   Patient pleasant and cooperative. Denies SI,HI, and hallucinations. Patient affect is flat, sad. Patient states he was having arguments with his wife and felt hopeless. Patient states he has left his wife \"many times\" but \"I always come back to her\". Patient does not states exactly what they were having a disagreement about. Patient states he went to garage and drank \"some\" antifreeze he then went and watched some TV and went and drank some more antifreeze. Patient states he didn't tell his wife until the next morning when he wasn't feeling well. Patient states \"I really did almost die\". Patient states he treats at the [facility].  Will continue to observe and support  Admission Type:   Admission Type: Voluntary    Reason for admission:  Reason for Admission: \"i made a suicide attempt by drinking antifreeze\"    PATIENT STRENGTHS:  Strengths: Communication    Patient Strengths and Limitations:  Limitations: Multiple barriers to leisure interests, Hopeless about future, Difficulty problem solving/relies on others to help solve problems    Addictive Behavior:   Addictive Behavior  In the past 3 months, have you felt or has someone told you that you have a problem with:  : None  Do you have a history of Chemical Use?: No  Do you have a history of Alcohol Use?: No  Do you have a history of Street Drug Abuse?: No  Histroy of Prescripton Drug Abuse?: No    Medical Problems:   Past Medical History:   Diagnosis Date    Cancer ([facility] [address]     Degeneration of lumbar intervertebral disc 9/14/2011    Depression     HIGH CHOLESTEROL     Hypertriglyceridemia 9/14/2011    Major depressive disorder, recurrent episode, severe ([address] 9/14/2011    MDD (major depressive disorder), recurrent severe, without psychosis ([address]     Overactive bladder 9/14/2011    Prostate cancer ([address]     Suicidal ideations     Urinary incontinence        Status EXAM:  Status and Exam  Normal: No  Facial Expression: Flat  Affect: Appropriate  Level of Consciousness: Alert  Mood:Normal: No  Mood: Depressed, Sad  Motor Activity:Normal: Yes  Interview Behavior: Cooperative  Preception: [place] to Person, Triston Rocher to Time, [place] to Place, [place] to Situation  Attention:Normal: Yes  Thought Processes: Circumstantial  Thought Content:Normal: Yes  Hallucinations: None  Delusions: No  Memory:Normal: No  Memory: Poor Recent  Insight and Judgment: No  Insight and Judgment: Poor Judgment  Present Suicidal Ideation: No  Present Homicidal Ideation: No    Tobacco Screening:  Practical Counseling, on admission, sarah X, if applicable and completed (first 3 are required if patient doesn't refuse):            ( )  Recognizing danger situations (included triggers and roadblocks)                    ( )  Coping skills (new ways to manage stress, exercise, relaxation techniques, changing routine, distraction)                                                           ( )  Basic information about quitting (benefits of quitting, techniques in how to quit, available resources  ( ) Referral for counseling faxed to [facility]                                           ( ) Patient refused counseling  (x ) Patient has not smoked in the last 30 days    Metabolic Screening:    No results found for: LABA1C    Lab Results   Component Value Date    CHOL 227 (H) 09/11/2017    CHOL 220 (H) 06/22/2017     Lab Results   Component Value Date    TRIG 118 09/11/2017    TRIG 204 (H) 06/22/2017     Lab Results   Component Value Date    HDL 40 09/11/2017    HDL 30 06/22/2017     No components found for: LDLCAL  Lab Results   Component Value Date    LABVLDL 24 09/11/2017    LABVLDL 41 06/22/2017         Body mass index is 23.86 kg/m².     BP Readings from Last 2 Encounters:   04/03/20 121/74   04/02/20 128/80           Pt admitted with followings belongings:  Dentures: Lowers, Uppers  Vision - Corrective Lenses: Glasses  Hearing Aid: None  Jewelry: None  Body
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
## 2020-04-03 NOTE — PLAN OF CARE
[facility]  Initial Interdisciplinary Treatment Plan NOTE    Review Date & Time: 4/3/20 1015    Patient was not in treatment team    Admission Type:   Admission Type: Voluntary    Reason for admission:  Reason for Admission: \"i made a suicide attempt by drinking antifreeze\"      Estimated Length of Stay Update:  3-5 days  Estimated Discharge Date Update: 4/7/20    PATIENT STRENGTHS:  Patient Strengths Strengths: Communication  Patient Strengths and Limitations:Limitations: Multiple barriers to leisure interests, Hopeless about future, Difficulty problem solving/relies on others to help solve problems  Addictive Behavior:Addictive Behavior  In the past 3 months, have you felt or has someone told you that you have a problem with:  : None  Do you have a history of Chemical Use?: No  Do you have a history of Alcohol Use?: No  Do you have a history of Street Drug Abuse?: No  Histroy of Prescripton Drug Abuse?: No  Medical Problems:  Past Medical History:   Diagnosis Date    Cancer ([facility] [address]     Degeneration of lumbar intervertebral disc 9/14/2011    Depression     HIGH CHOLESTEROL     Hypertriglyceridemia 9/14/2011    Major depressive disorder, recurrent episode, severe ([facility] [address] 9/14/2011    MDD (major depressive disorder), recurrent severe, without psychosis ([facility] [address]     Overactive bladder 9/14/2011    Prostate cancer ([facility] [address]     Suicidal ideations     Urinary incontinence        EDUCATION:   Learner Progress Toward Treatment Goals: Reviewed goals and plan of care    Method: Small group    Outcome: Verbalized understanding    PATIENT GOALS: \"to have a better mood\"    PLAN/TREATMENT RECOMMENDATIONS UPDATE:encourage group attendance and provide emotional support    GOALS UPDATE:   Time frame for Short-Term Goals: 1-3 days    Cameron Barvo RN

## 2020-04-03 NOTE — PROGRESS NOTES
Patient signed ALEXIS for [address] [facility].  [facility] [place]

## 2020-04-03 NOTE — PLAN OF CARE
Problem: Depressive Behavior With or Without Suicide Precautions:  Goal: Able to verbalize support systems  Description: Able to verbalize support systems  4/3/2020 0304 by Lizz Brambila  Outcome: Met This Shift     Problem: Depressive Behavior With or Without Suicide Precautions:  Goal: Absence of self-harm  Description: Absence of self-harm  4/3/2020 0304 by Lizz Brambila  Outcome: Met This Shift

## 2020-04-03 NOTE — CONSULTS
(ABILIFY) 2 MG tablet, Take 2 mg by mouth daily  sildenafil (VIAGRA) 100 MG tablet, Take 50 mg by mouth daily as needed for Erectile Dysfunction Take 1/2 tab of  (100 mg tab) by mouth = 50 mg 1 hour before sex  FOLIC ACID PO, Take 1 mg by mouth daily     Allergies:    Ketamine    Social History:    reports that he has never smoked. He has never used smokeless tobacco. He reports that he does not drink alcohol or use drugs. Family History:   family history includes Cancer in his brother and sister; Depression in his brother and sister. REVIEW OF SYSTEMS:  As above in the HPI, otherwise negative    PHYSICAL EXAM:    Vitals:  /74   Pulse 83   Temp 98.7 °F (37.1 °C) (Temporal)   Resp 16   Ht 6' 2\" (1.88 m)   Wt 185 lb 12.8 oz (84.3 kg)   SpO2 97%   BMI 23.86 kg/m²     General:  Awake, alert, oriented X 3. Well developed, well nourished, well groomed. No apparent distress. HEENT:  Normocephalic, atraumatic. Pupils equal, round, reactive to light. No scleral icterus. No conjunctival injection. Normal lips, teeth, and gums. No nasal discharge. Neck:  Supple  Heart:  RRR, no murmurs, gallops, rubs  Lungs:  CTA bilaterally, bilat symmetrical expansion, no wheeze, rales, or rhonchi  Abdomen:   Bowel sounds present, soft, nontender, no masses, no organomegaly, no peritoneal signs  Extremities:  No clubbing, cyanosis, or edema  Skin:  Warm and dry, no open lesions or rash  Neuro:  Cranial nerves 2-12 intact, no focal deficits  Breast: deferred  Rectal: deferred  Genitalia:  deferred    LABS:    CBC with Differential:    Lab Results   Component Value Date    WBC 7.4 03/31/2020    RBC 4.11 03/31/2020    HGB 12.3 03/31/2020    HCT 38.6 03/31/2020     03/31/2020    MCV 93.9 03/31/2020    MCH 29.9 03/31/2020    MCHC 31.9 03/31/2020    RDW 13.7 03/31/2020    SEGSPCT 71 09/14/2011    LYMPHOPCT 16.8 03/31/2020    MONOPCT 14.0 03/31/2020    BASOPCT 0.4 03/31/2020    MONOSABS 1.04 03/31/2020    LYMPHSABS

## 2020-04-03 NOTE — PLAN OF CARE
Pt denies SI, HI and hallucinations. Alert and oriented x4. Pleasant and cooperative, denied feeling depressed and anxious. Pt states \"I feel good now since I am clean\". Pt showered earlier today. Out on unit most of evening watching television. Spoke to wife on phone. Close observation continue.     Problem: Depressive Behavior With or Without Suicide Precautions:  Goal: Able to verbalize acceptance of life and situations over which he or she has no control  Description: Able to verbalize acceptance of life and situations over which he or she has no control  4/2/2020 2206 by Jaja Donaldson  Outcome: Met This Shift     Problem: Depressive Behavior With or Without Suicide Precautions:  Goal: Able to verbalize and/or display a decrease in depressive symptoms  Description: Able to verbalize and/or display a decrease in depressive symptoms  4/2/2020 2206 by Jaja Donaldson  Outcome: Met This Shift     Problem: Depressive Behavior With or Without Suicide Precautions:  Goal: Ability to disclose and discuss suicidal ideas will improve  Description: Ability to disclose and discuss suicidal ideas will improve  Outcome: Met This Shift     Problem: Depressive Behavior With or Without Suicide Precautions:  Goal: Able to verbalize support systems  Description: Able to verbalize support systems  Outcome: Met This Shift     Problem: Depressive Behavior With or Without Suicide Precautions:  Goal: Absence of self-harm  Description: Absence of self-harm  Outcome: Met This Shift

## 2020-04-03 NOTE — PROGRESS NOTES
Patient attended goals group and stated daily goal as to [facility] a better mood towards the end of the day. \" Patient attended Psychoeducation group was able to participate and shared his coping skills of cooking, gardening and reading as a way to cope with his mood declining throughout the day.

## 2020-04-04 LAB
ANION GAP SERPL CALCULATED.3IONS-SCNC: 12 MMOL/L (ref 7–16)
BUN BLDV-MCNC: 25 MG/DL (ref 8–23)
CALCIUM SERPL-MCNC: 8.8 MG/DL (ref 8.6–10.2)
CHLORIDE BLD-SCNC: 108 MMOL/L (ref 98–107)
CO2: 25 MMOL/L (ref 22–29)
CREAT SERPL-MCNC: 2.2 MG/DL (ref 0.7–1.2)
GFR AFRICAN AMERICAN: 36
GFR NON-AFRICAN AMERICAN: 30 ML/MIN/1.73
GLUCOSE BLD-MCNC: 84 MG/DL (ref 74–99)
POTASSIUM SERPL-SCNC: 4.3 MMOL/L (ref 3.5–5)
SODIUM BLD-SCNC: 145 MMOL/L (ref 132–146)

## 2020-04-04 PROCEDURE — 2580000003 HC RX 258: Performed by: INTERNAL MEDICINE

## 2020-04-04 PROCEDURE — 99232 SBSQ HOSP IP/OBS MODERATE 35: CPT | Performed by: NURSE PRACTITIONER

## 2020-04-04 PROCEDURE — 1240000000 HC EMOTIONAL WELLNESS R&B

## 2020-04-04 PROCEDURE — 6370000000 HC RX 637 (ALT 250 FOR IP): Performed by: INTERNAL MEDICINE

## 2020-04-04 PROCEDURE — 6370000000 HC RX 637 (ALT 250 FOR IP): Performed by: NURSE PRACTITIONER

## 2020-04-04 PROCEDURE — 80048 BASIC METABOLIC PNL TOTAL CA: CPT

## 2020-04-04 PROCEDURE — 36415 COLL VENOUS BLD VENIPUNCTURE: CPT

## 2020-04-04 RX ADMIN — DONEPEZIL HYDROCHLORIDE 5 MG: 5 TABLET, FILM COATED ORAL at 21:37

## 2020-04-04 RX ADMIN — SODIUM CHLORIDE: 9 INJECTION, SOLUTION INTRAVENOUS at 21:12

## 2020-04-04 RX ADMIN — LEVOTHYROXINE SODIUM 50 MCG: 0.05 TABLET ORAL at 06:38

## 2020-04-04 RX ADMIN — Medication 10 ML: at 21:39

## 2020-04-04 RX ADMIN — SODIUM CHLORIDE: 9 INJECTION, SOLUTION INTRAVENOUS at 10:49

## 2020-04-04 RX ADMIN — ARIPIPRAZOLE 2 MG: 2 TABLET ORAL at 08:39

## 2020-04-04 RX ADMIN — VENLAFAXINE HYDROCHLORIDE 75 MG: 75 CAPSULE, EXTENDED RELEASE ORAL at 08:39

## 2020-04-04 RX ADMIN — SODIUM CHLORIDE: 9 INJECTION, SOLUTION INTRAVENOUS at 00:20

## 2020-04-04 ASSESSMENT — PAIN SCALES - GENERAL
PAINLEVEL_OUTOF10: 0

## 2020-04-04 NOTE — PROGRESS NOTES
BEHAVIORAL HEALTH FOLLOW-UP NOTE     4/4/2020     Patient was seen and examined in person, Chart reviewed   Patient's case discussed with staff/team    Chief Complaint: I am feeling okay today    Interim History: At this time the patient denies suicidal homicidal ideations. He denies audible and visual hallucinations. The patient states that his depression is a 3 out of 10 but appears very sad and flat. The patient is taking his medications and going to group.       Appetite:  [x] Normal/Unchanged  [] Increased  [] Decreased      Sleep:       [x] Normal/Unchanged  [] Fair       [] Poor              Energy:    [x] Normal/Unchanged  [] Increased  [] Decreased        SI [] Present  [x] Absent    HI  []Present  [x] Absent     Aggression:  [] yes  [x] no    Patient is [x] able  [] unable to CONTRACT FOR SAFETY     PAST MEDICAL/PSYCHIATRIC HISTORY:   Past Medical History:   Diagnosis Date    Cancer [facility])     Degeneration of lumbar intervertebral disc 9/14/2011    Depression     HIGH CHOLESTEROL     Hypertriglyceridemia 9/14/2011    Major depressive disorder, recurrent episode, severe ([facility] [address] 9/14/2011    MDD (major depressive disorder), recurrent severe, without psychosis ([facility] [address]     Overactive bladder 9/14/2011    Prostate cancer ([facility] [address]     Suicidal ideations     Urinary incontinence        FAMILY/SOCIAL HISTORY:  Family History   Problem Relation Age of Onset    Cancer Sister     Depression Sister     Cancer Brother     Depression Brother      Social History     Socioeconomic History    Marital status:      Spouse name: Not on file    Number of children: 0    Years of education: Not on file    Highest education level: Not on file   Occupational History    Not on file   Social Needs    Financial resource strain: Not on file    Food insecurity     Worry: Not on file     Inability: Not on file    Transportation needs     Medical: Not on file     Non-medical: Not on file   Tobacco Use    Smoking status: Never Smoker    Smokeless tobacco: Never Used   Substance and Sexual Activity    Alcohol use: No     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, former alcoholic    Drug use: No    Sexual activity: Yes   Lifestyle    Physical activity     Days per week: Not on file     Minutes per session: Not on file    Stress: Not on file   Relationships    Social connections     Talks on phone: Not on file     Gets together: Not on file     Attends Spiritism service: Not on file     Active member of club or organization: Not on file     Attends meetings of clubs or organizations: Not on file     Relationship status: Not on file    Intimate partner violence     Fear of current or ex partner: Not on file     Emotionally abused: Not on file     Physically abused: Not on file     Forced sexual activity: Not on file   Other Topics Concern    Not on file   Social History Narrative    Not on file           ROS:  [x] All negative/unchanged except if checked.  Explain positive(checked items) below:  [] Constitutional  [] Eyes  [] Ear/Nose/Mouth/Throat  [] Respiratory  [] CV  [] GI  []   [] Musculoskeletal  [] Skin/Breast  [] Neurological  [] Endocrine  [] Heme/Lymph  [] Allergic/Immunologic    Explanation:     MEDICATIONS:    Current Facility-Administered Medications:     0.9 % sodium chloride infusion, , Intravenous, Continuous, Aleksandr Gray MD, Last Rate: 100 mL/hr at 04/04/20 1049    venlafaxine (EFFEXOR XR) extended release capsule 75 mg, 75 mg, Oral, Daily, You Heath, APRN - CNP, 75 mg at 04/04/20 0839    haloperidol lactate (HALDOL) injection 3 mg, 3 mg, Intramuscular, Q6H PRN **OR** haloperidol (HALDOL) tablet 3 mg, 3 mg, Oral, Q6H PRN, Corrina Person MD    traZODone (DESYREL) tablet 25 mg, 25 mg, Oral, Nightly PRN, Corrina Person MD    aluminum & magnesium hydroxide-simethicone (MAALOX) 200-200-20 MG/5ML suspension 30 mL, 30 mL, Oral, PRN, Corrina Person MD    acetaminophen (TYLENOL) tablet 650 mg, 650

## 2020-04-04 NOTE — GROUP NOTE
Group Therapy Note    Date: 4/3/2020    Group Start Time: 1930  Group End Time: 2030  Group Topic: Relaxation    [facility] [address]        Group Therapy Note    Attendees: 3/5           Signature:  Facundo Olvera

## 2020-04-04 NOTE — PROGRESS NOTES
appearance: appears stated age   Skin:  No rashes or lesions  HEENT: Head: Normocephalic, no lesions, without obvious abnormality. Neck: no adenopathy, no carotid bruit, no JVD, supple, symmetrical, trachea midline and thyroid not enlarged, symmetric, no tenderness/mass/nodules  Lungs: clear to auscultation bilaterally  Heart: regular rate and rhythm, S1, S2 normal, no murmur, click, rub or gallop  Abdomen: soft, non-tender; bowel sounds normal; no masses,  no organomegaly  Extremities: extremities normal, atraumatic, no cyanosis or edema  Neurologic: Mental status: Alert, oriented, thought content appropriate    Assessment:   Patient Active Problem List:     Hypertriglyceridemia     Recurrent major depressive disorder (HCC)     Severe episode of recurrent major depressive disorder, without psychotic features ([facility] [address]     Suicidal ideation     Mixed hyperlipidemia     Suicide attempt by substance overdose ([facility] [address]     Metabolic acidosis due to ethylene glycol     Ethylene glycol poisoning     Drug overdoses, intentional self-harm, initial encounter ([facility] [address]     DESIREE (acute kidney injury) ([facility] [address]    Plan:   Assessment/Plans     1. DESIREE  Stage 1  Baseline cr 1.1  due to ethylene glycol toxicity  nonoliguric  Ethylene glycol levels ordered pre and post HD- pending from 3/30  Hemodialysis 3/29 and 3/30, temp line removed 3/30  fomepizole, continue until level available  I&O as recorded notedwith good po intake. Scr remains above baseline.      2. HAG metabolic acidosis with osmolar gap  strongly suggestive of toxic alcohol ingestion  CO2 31--> 28-->29-->25, off  IV bicarb drip       3. Hypertension   controlled not requiring any antihypertensive now     4.  Attempted suicide  On Psych floor     Continue present care     Madalyn Huang

## 2020-04-04 NOTE — PLAN OF CARE
Problem: Depressive Behavior With or Without Suicide Precautions:  Goal: Able to verbalize and/or display a decrease in depressive symptoms  Description: Able to verbalize and/or display a decrease in depressive symptoms  4/4/2020 1034 by Dinora Gomez RN  Outcome: Met This Shift     Problem: Depressive Behavior With or Without Suicide Precautions:  Goal: Able to verbalize support systems  Description: Able to verbalize support systems  Outcome: Met This Shift     Problem: Depressive Behavior With or Without Suicide Precautions:  Goal: Absence of self-harm  Description: Absence of self-harm  Outcome: Met This Shift     Pt in room sitting on edge of bed. Pt denies SI/HI  and AV hallucinations. He rates his depression and anxiety 0/10. Pt appears flat. Pt is isolative to room. Pt out  on unit for meals. Pt stated he is looking forward to going home. Pt is medication compliant. Will continue to provide comfort and support for patient.

## 2020-04-05 LAB
ANION GAP SERPL CALCULATED.3IONS-SCNC: 13 MMOL/L (ref 7–16)
BUN BLDV-MCNC: 23 MG/DL (ref 8–23)
CALCIUM SERPL-MCNC: 8.6 MG/DL (ref 8.6–10.2)
CHLORIDE BLD-SCNC: 109 MMOL/L (ref 98–107)
CO2: 23 MMOL/L (ref 22–29)
CREAT SERPL-MCNC: 2.3 MG/DL (ref 0.7–1.2)
GFR AFRICAN AMERICAN: 34
GFR NON-AFRICAN AMERICAN: 28 ML/MIN/1.73
GLUCOSE BLD-MCNC: 87 MG/DL (ref 74–99)
POTASSIUM SERPL-SCNC: 4.4 MMOL/L (ref 3.5–5)
SODIUM BLD-SCNC: 145 MMOL/L (ref 132–146)

## 2020-04-05 PROCEDURE — 36415 COLL VENOUS BLD VENIPUNCTURE: CPT

## 2020-04-05 PROCEDURE — 1240000000 HC EMOTIONAL WELLNESS R&B

## 2020-04-05 PROCEDURE — 6370000000 HC RX 637 (ALT 250 FOR IP): Performed by: PSYCHIATRY & NEUROLOGY

## 2020-04-05 PROCEDURE — 2580000003 HC RX 258: Performed by: INTERNAL MEDICINE

## 2020-04-05 PROCEDURE — 6370000000 HC RX 637 (ALT 250 FOR IP): Performed by: NURSE PRACTITIONER

## 2020-04-05 PROCEDURE — 80048 BASIC METABOLIC PNL TOTAL CA: CPT

## 2020-04-05 PROCEDURE — 99232 SBSQ HOSP IP/OBS MODERATE 35: CPT | Performed by: NURSE PRACTITIONER

## 2020-04-05 PROCEDURE — 6370000000 HC RX 637 (ALT 250 FOR IP): Performed by: INTERNAL MEDICINE

## 2020-04-05 RX ADMIN — SODIUM CHLORIDE: 9 INJECTION, SOLUTION INTRAVENOUS at 17:06

## 2020-04-05 RX ADMIN — SODIUM CHLORIDE: 9 INJECTION, SOLUTION INTRAVENOUS at 06:51

## 2020-04-05 RX ADMIN — VENLAFAXINE HYDROCHLORIDE 75 MG: 75 CAPSULE, EXTENDED RELEASE ORAL at 08:23

## 2020-04-05 RX ADMIN — LEVOTHYROXINE SODIUM 50 MCG: 0.05 TABLET ORAL at 07:03

## 2020-04-05 RX ADMIN — ARIPIPRAZOLE 2 MG: 2 TABLET ORAL at 08:23

## 2020-04-05 RX ADMIN — TRAZODONE HYDROCHLORIDE 25 MG: 50 TABLET ORAL at 23:31

## 2020-04-05 RX ADMIN — DONEPEZIL HYDROCHLORIDE 5 MG: 5 TABLET, FILM COATED ORAL at 21:01

## 2020-04-05 ASSESSMENT — PAIN SCALES - GENERAL
PAINLEVEL_OUTOF10: 0
PAINLEVEL_OUTOF10: 0

## 2020-04-05 NOTE — PROGRESS NOTES
BEHAVIORAL HEALTH FOLLOW-UP NOTE     4/5/2020     Patient was seen and examined in person, Chart reviewed   Patient's case discussed with staff/team    Chief Complaint: I am feeling okay today    Interim History: At this time the patient denies suicidal homicidal ideations. He denies audible and visual hallucinations. The patient states that his depression is a 3 out of 10 but appears very sad and flat. The patient is taking his medications and going to group. The patient states to me that he is feeling good.   And that he said that he feels significantly better since he has arrived at the hospital.  The patient does still appear to be depressed though with a very flat affect      Appetite:  [x] Normal/Unchanged  [] Increased  [] Decreased      Sleep:       [x] Normal/Unchanged  [] Fair       [] Poor              Energy:    [x] Normal/Unchanged  [] Increased  [] Decreased        SI [] Present  [x] Absent    HI  []Present  [x] Absent     Aggression:  [] yes  [x] no    Patient is [x] able  [] unable to CONTRACT FOR SAFETY     PAST MEDICAL/PSYCHIATRIC HISTORY:   Past Medical History:   Diagnosis Date    Cancer [facility])     Degeneration of lumbar intervertebral disc 9/14/2011    Depression     HIGH CHOLESTEROL     Hypertriglyceridemia 9/14/2011    Major depressive disorder, recurrent episode, severe ([facility] [address] 9/14/2011    MDD (major depressive disorder), recurrent severe, without psychosis ([facility] [address]     Overactive bladder 9/14/2011    Prostate cancer ([facility] [address]     Suicidal ideations     Urinary incontinence        FAMILY/SOCIAL HISTORY:  Family History   Problem Relation Age of Onset    Cancer Sister     Depression Sister     Cancer Brother     Depression Brother      Social History     Socioeconomic History    Marital status:      Spouse name: Not on file    Number of children: 0    Years of education: Not on file    Highest education level: Not on file   Occupational History    Not on file   Social Needs    Financial resource strain: Not on file    Food insecurity     Worry: Not on file     Inability: Not on file    Transportation needs     Medical: Not on file     Non-medical: Not on file   Tobacco Use    Smoking status: Never Smoker    Smokeless tobacco: Never Used   Substance and Sexual Activity    Alcohol use: No     Comment: 33 years sober, former alcoholic    Drug use: No    Sexual activity: Yes   Lifestyle    Physical activity     Days per week: Not on file     Minutes per session: Not on file    Stress: Not on file   Relationships    Social connections     Talks on phone: Not on file     Gets together: Not on file     Attends Restorationism service: Not on file     Active member of club or organization: Not on file     Attends meetings of clubs or organizations: Not on file     Relationship status: Not on file    Intimate partner violence     Fear of current or ex partner: Not on file     Emotionally abused: Not on file     Physically abused: Not on file     Forced sexual activity: Not on file   Other Topics Concern    Not on file   Social History Narrative    Not on file           ROS:  [x] All negative/unchanged except if checked.  Explain positive(checked items) below:  [] Constitutional  [] Eyes  [] Ear/Nose/Mouth/Throat  [] Respiratory  [] CV  [] GI  []   [] Musculoskeletal  [] Skin/Breast  [] Neurological  [] Endocrine  [] Heme/Lymph  [] Allergic/Immunologic    Explanation:     MEDICATIONS:    Current Facility-Administered Medications:     0.9 % sodium chloride infusion, , Intravenous, Continuous, Juan Carlos Urrutia MD, Last Rate: 100 mL/hr at 04/05/20 0651    venlafaxine (EFFEXOR XR) extended release capsule 75 mg, 75 mg, Oral, Daily, Kalyan Richadr Heath, RITA - CNP, 75 mg at 04/05/20 0823    haloperidol lactate (HALDOL) injection 3 mg, 3 mg, Intramuscular, Q6H PRN **OR** haloperidol (HALDOL) tablet 3 mg, 3 mg, Oral, Q6H PRN, Ciara Borja MD    traZODone (DESYREL) tablet 25 mg, 25 mg, Oral, Nightly PRN, Eladio Dhillon MD    aluminum & magnesium hydroxide-simethicone (MAALOX) 200-200-20 MG/5ML suspension 30 mL, 30 mL, Oral, PRN, Eladio Dhillon MD    acetaminophen (TYLENOL) tablet 650 mg, 650 mg, Oral, Q6H PRN **OR** acetaminophen (TYLENOL) suppository 650 mg, 650 mg, Rectal, Q6H PRN, Blessing Rubin MD    magnesium hydroxide (MILK OF MAGNESIA) 400 MG/5ML suspension 30 mL, 30 mL, Oral, Daily PRN, lBessing Rubin MD    promethazine (PHENERGAN) tablet 12.5 mg, 12.5 mg, Oral, Q6H PRN **OR** ondansetron (ZOFRAN) injection 4 mg, 4 mg, Intravenous, Q6H PRN, Blessing Rubin MD    sodium chloride flush 0.9 % injection 10 mL, 10 mL, Intravenous, 2 times per day, Blessing Rubin MD    sodium chloride flush 0.9 % injection 10 mL, 10 mL, Intravenous, PRN, Blessing Rubin MD, 10 mL at 04/04/20 2139    ARIPiprazole (ABILIFY) tablet 2 mg, 2 mg, Oral, Daily, Blessing Rubin MD, 2 mg at 04/05/20 7617    donepezil (ARICEPT) tablet 5 mg, 5 mg, Oral, Nightly, Blessing Rubin MD, 5 mg at 04/04/20 2137    levothyroxine (SYNTHROID) tablet 50 mcg, 50 mcg, Oral, Daily, Blessing Rubin MD, 50 mcg at 04/05/20 0703      Examination:  /81   Pulse 82   Temp 99 °F (37.2 °C) (Temporal)   Resp 16   Ht 6' 2\" (1.88 m)   Wt 185 lb 12.8 oz (84.3 kg)   SpO2 97%   BMI 23.86 kg/m²   Gait - steady  Medication side effects(SE):  No medication side effects to be noted, patient was educated on signs and symptoms of medication side effects instructed to notify medical staff if any signs and symptoms occur. Patient has the capacity to understand this information and what I instructed her to do if medication side effects arise.     Mental Status Examination:    Level of consciousness:  within normal limits   Appearance:  fair grooming and fair hygiene  Behavior/Motor: No psychomotor agitation or retardation noted  Attitude toward examiner: Cooperative  Speech: Normal rate rhythm  Mood: euthymic  Affect:  flat  Thought processes: linear   Thought content: Without hallucinations delusions or paranoia  Cognition:  oriented to person, place, and time   Concentration intact  Insight fair   Judgement fair     ASSESSMENT:   Patient symptoms are:  [] Well controlled  [] Improving  [] Worsening  [x] No change      Diagnosis:  Principal Problem:    Severe episode of recurrent major depressive disorder, without psychotic features ([facility] [address]  Resolved Problems:    * No resolved hospital problems. *      LABS:    No results for input(s): WBC, HGB, PLT in the last 72 hours. Recent Labs     04/04/20  0554 04/05/20  0622    145   K 4.3 4.4   * 109*   CO2 25 23   BUN 25* 23   CREATININE 2.2* 2.3*   GLUCOSE 84 87     No results for input(s): BILITOT, ALKPHOS, AST, ALT in the last 72 hours. Lab Results   Component Value Date    LABAMPH NOT DETECTED 03/28/2020    LABAMPH NOT DETECTED 09/13/2011    BARBSCNU NOT DETECTED 03/28/2020    LABBENZ NOT DETECTED 03/28/2020    LABBENZ NOT DETECTED 09/13/2011    CANNAB NOT DETECTED  09/13/2011    LABMETH NOT DETECTED 03/28/2020    OPIATESCREENURINE NOT DETECTED 03/28/2020    PHENCYCLIDINESCREENURINE NOT DETECTED 03/28/2020    ETOH <10 03/28/2020     Lab Results   Component Value Date    TSH 2.790 03/28/2020     Lab Results   Component Value Date    LITHIUM 0.13 (L) 08/10/2018     No results found for: VALPROATE, CBMZ        Treatment Plan:  Reviewed current Medications with the patient. Abilify 2 mg daily  Aricept 5 mg nightly  Effexor XR 75 mg daily  Risks, benefits, side effects, drug-to-drug interactions and alternatives to treatment were discussed. Collateral information: To be obtained by  to insure patient safety on discharge  CD evaluation  Encourage patient to attend group and other milieu activities.   Discharge planning discussed with the patient and treatment team.    PSYCHOTHERAPY/COUNSELING:  [x] Therapeutic interview  [x] Supportive  [] CBT  [] Ongoing  [] Other    [x] Patient continues to need, on a daily basis, active treatment furnished directly by or requiring the supervision of inpatient psychiatric personnel      Anticipated Length of stay: 5 to 7 days            Electronically signed by RITA Perez CNP on 4/5/2020 at 11:31 AM

## 2020-04-05 NOTE — PLAN OF CARE
Problem: Depressive Behavior With or Without Suicide Precautions:  Goal: Able to verbalize and/or display a decrease in depressive symptoms  Description: Able to verbalize and/or display a decrease in depressive symptoms  4/5/2020 0936 by Irene Gonzales RN  Outcome: Met This Shift     Problem: Depressive Behavior With or Without Suicide Precautions:  Goal: Absence of self-harm  Description: Absence of self-harm  4/5/2020 0936 by Irene Gonzales RN  Outcome: Met This Shift     Problem: Depressive Behavior With or Without Suicide Precautions:  Goal: Participates in care planning  Description: Participates in care planning  Outcome: Met This Shift     Pt up and about unit in the TV area. Pt denies SI/HI and AV hallucinations. He states he feels pretty good and denies depression and anxiety. He appears flat and sad. Although he is up and about unit he stays to himself. Pt taking prescribed medications. Pt eating provided meals. Will continue to provide comfort and support.

## 2020-04-06 LAB
ANION GAP SERPL CALCULATED.3IONS-SCNC: 10 MMOL/L (ref 7–16)
BUN BLDV-MCNC: 23 MG/DL (ref 8–23)
CALCIUM SERPL-MCNC: 8.8 MG/DL (ref 8.6–10.2)
CHLORIDE BLD-SCNC: 112 MMOL/L (ref 98–107)
CO2: 24 MMOL/L (ref 22–29)
CREAT SERPL-MCNC: 2.2 MG/DL (ref 0.7–1.2)
GFR AFRICAN AMERICAN: 36
GFR NON-AFRICAN AMERICAN: 30 ML/MIN/1.73
GLUCOSE BLD-MCNC: 87 MG/DL (ref 74–99)
POTASSIUM SERPL-SCNC: 4.6 MMOL/L (ref 3.5–5)
SODIUM BLD-SCNC: 146 MMOL/L (ref 132–146)

## 2020-04-06 PROCEDURE — 99232 SBSQ HOSP IP/OBS MODERATE 35: CPT | Performed by: NURSE PRACTITIONER

## 2020-04-06 PROCEDURE — 6370000000 HC RX 637 (ALT 250 FOR IP): Performed by: NURSE PRACTITIONER

## 2020-04-06 PROCEDURE — 1240000000 HC EMOTIONAL WELLNESS R&B

## 2020-04-06 PROCEDURE — 2580000003 HC RX 258: Performed by: INTERNAL MEDICINE

## 2020-04-06 PROCEDURE — 36415 COLL VENOUS BLD VENIPUNCTURE: CPT

## 2020-04-06 PROCEDURE — 6370000000 HC RX 637 (ALT 250 FOR IP): Performed by: INTERNAL MEDICINE

## 2020-04-06 PROCEDURE — 80048 BASIC METABOLIC PNL TOTAL CA: CPT

## 2020-04-06 RX ADMIN — LEVOTHYROXINE SODIUM 50 MCG: 0.05 TABLET ORAL at 06:36

## 2020-04-06 RX ADMIN — SODIUM CHLORIDE: 9 INJECTION, SOLUTION INTRAVENOUS at 03:36

## 2020-04-06 RX ADMIN — ARIPIPRAZOLE 2 MG: 2 TABLET ORAL at 09:24

## 2020-04-06 RX ADMIN — DONEPEZIL HYDROCHLORIDE 5 MG: 5 TABLET, FILM COATED ORAL at 21:02

## 2020-04-06 RX ADMIN — SODIUM CHLORIDE: 9 INJECTION, SOLUTION INTRAVENOUS at 14:23

## 2020-04-06 RX ADMIN — VENLAFAXINE HYDROCHLORIDE 75 MG: 75 CAPSULE, EXTENDED RELEASE ORAL at 09:24

## 2020-04-06 ASSESSMENT — PAIN SCALES - GENERAL
PAINLEVEL_OUTOF10: 0
PAINLEVEL_OUTOF10: 0

## 2020-04-06 NOTE — PROGRESS NOTES
Patient awake in room. Resting comfortably. Has no physical complaints at this time. Denies suicidal/homicidal ideations and hallucinations. Purposeful rounding continued.

## 2020-04-06 NOTE — PROGRESS NOTES
Progress Note  4/6/2020 2:57 PM  Subjective:   Admit Date: 4/2/2020  PCP: Jerrod Ramos MD  Interval History: Patient states he is feeling well, has no complaints. Hoping to go home soon, misses wife and dogs. Diet: DIET GENERAL; Safety Tray; Safety Tray (Disposables)    Data:   Scheduled Meds:   venlafaxine  75 mg Oral Daily    sodium chloride flush  10 mL Intravenous 2 times per day    ARIPiprazole  2 mg Oral Daily    donepezil  5 mg Oral Nightly    levothyroxine  50 mcg Oral Daily     Continuous Infusions:   sodium chloride 100 mL/hr at 04/06/20 1423     PRN Meds:haloperidol lactate **OR** haloperidol, traZODone, aluminum & magnesium hydroxide-simethicone, acetaminophen **OR** acetaminophen, magnesium hydroxide, promethazine **OR** ondansetron, sodium chloride flush  I/O last 3 completed shifts: In: 1100 [P.O.:1100]  Out: -   I/O this shift:  In: 240 [P.O.:240]  Out: -     Intake/Output Summary (Last 24 hours) at 4/6/2020 1457  Last data filed at 4/6/2020 0935  Gross per 24 hour   Intake 240 ml   Output --   Net 240 ml     CBC: No results for input(s): WBC, HGB, PLT in the last 72 hours. BMP:    Recent Labs     04/04/20  0554 04/05/20  0622 04/06/20  0548    145 146   K 4.3 4.4 4.6   * 109* 112*   CO2 25 23 24   BUN 25* 23 23   CREATININE 2.2* 2.3* 2.2*   GLUCOSE 84 87 87     Hepatic: No results for input(s): AST, ALT, ALB, BILITOT, ALKPHOS in the last 72 hours. Troponin: No results for input(s): TROPONINI in the last 72 hours. BNP: No results for input(s): BNP in the last 72 hours. Lipids: No results for input(s): CHOL, HDL in the last 72 hours. Invalid input(s): LDLCALCU  ABGs:   Lab Results   Component Value Date    PO2ART 92.4 03/28/2020    JME9JPU 32.7 03/28/2020     INR: No results for input(s): INR in the last 72 hours. -----------------------------------------------------------------  RAD: No results found.     Objective:   Vitals: /85   Pulse 80   Temp 97.8 °F (36.6 °C) (Temporal)   Resp 18   Ht 6' 2\" (1.88 m)   Wt 185 lb 12.8 oz (84.3 kg)   SpO2 97%   BMI 23.86 kg/m²   General appearance: appears stated age, in no acute distress  Skin:  No rashes on exposed skin  HEENT: Head: Normocephalic, no lesions, without obvious abnormality. Neck: No JVD  Lungs: clear b/l  Heart: RRR, no rubs  Abdomen: non-tender, non-distended  Extremities: no edema  Neurologic: Mental status: Alert, oriented, thought content appropriate    Assessment:   Patient Active Problem List:     Hypertriglyceridemia     Recurrent major depressive disorder (HCC)     Severe episode of recurrent major depressive disorder, without psychotic features ([facility] [address]     Suicidal ideation     Mixed hyperlipidemia     Suicide attempt by substance overdose ([facility] [address]     Metabolic acidosis due to ethylene glycol     Ethylene glycol poisoning     Drug overdoses, intentional self-harm, initial encounter ([facility] [address]     DESIREE (acute kidney injury) ([facility] [address]    Plan:   Assessment/Plans     1. DESIREE  Stage 1 due to ethylene glycol toxicity  Baseline cr 1.1  nonoliguric  Hemodialysis 3/29 and 3/30, temp line removed 3/30  Serum cr remains above baseline at 2.2 today     2. HAG metabolic acidosis with osmolar gap  strongly suggestive of toxic alcohol ingestion  CO2 31--> 28-->29-->25, off  IV bicarb drip   Resolved     3. Hypertension   BP at/near target of below 130/80  controlled not requiring any antihypertensive now     4. Attempted suicide  On Psych floor      No changes, continue present care        RITA Hardin-CNS    Patient seen and examined all key components of the physical performed independently , case discussed with NP, all pertinent labs and radiologic tests personally reviewed agree with above.       Jarocho Ferrer MD

## 2020-04-06 NOTE — CARE COORDINATION
Patient asked  to call Makayla Conteh within 48 hours of discharge to make transportation arnagements

## 2020-04-06 NOTE — PROGRESS NOTES
Group Therapy Note    Date: 4/6/2020  Start Time: 1:30  End Time:  2:15  Number of Participants: 4    Type of Group: Psychotherapy    Wellness Binder Information  Module Name:    Session Number:     Patient's Goal: Gain insight into interpersonal relationships by interacting with others. Notes: Pt was able to express strong feeling regarding coping with change. Pt was given support and feed back.     Status After Intervention:  Improved    Participation Level: Interactive    Participation Quality: Attentive and Sharing      Speech: Normal      Thought Process/Content: Logical      Affective Functioning: Congruent      Mood: anxious and depressed      Level of consciousness:  Oriented x4 and Attentive      Response to Learning: Able to verbalize/acknowledge new learning      Endings    Modes of Intervention: Support and Socialization      Discipline Responsible: /Counselor      Signature:  RADHA Goodman

## 2020-04-06 NOTE — PROGRESS NOTES
attend group and other milieu activities.   Discharge planning discussed with the patient and treatment team.    PSYCHOTHERAPY/COUNSELING:  [x] Therapeutic interview  [x] Supportive  [] CBT  [] Ongoing  [] Other    [x] Patient continues to need, on a daily basis, active treatment furnished directly by or requiring the supervision of inpatient psychiatric personnel      Anticipated Length of stay: 5 to 7 days            Electronically signed by RITA Powell CNP on 4/6/2020 at 12:41 PM

## 2020-04-07 LAB
ANION GAP SERPL CALCULATED.3IONS-SCNC: 12 MMOL/L (ref 7–16)
BUN BLDV-MCNC: 22 MG/DL (ref 8–23)
CALCIUM SERPL-MCNC: 8.9 MG/DL (ref 8.6–10.2)
CHLORIDE BLD-SCNC: 106 MMOL/L (ref 98–107)
CO2: 24 MMOL/L (ref 22–29)
CREAT SERPL-MCNC: 2.2 MG/DL (ref 0.7–1.2)
GFR AFRICAN AMERICAN: 36
GFR NON-AFRICAN AMERICAN: 30 ML/MIN/1.73
GLUCOSE BLD-MCNC: 88 MG/DL (ref 74–99)
POTASSIUM SERPL-SCNC: 4.2 MMOL/L (ref 3.5–5)
SODIUM BLD-SCNC: 142 MMOL/L (ref 132–146)

## 2020-04-07 PROCEDURE — 80048 BASIC METABOLIC PNL TOTAL CA: CPT

## 2020-04-07 PROCEDURE — 36415 COLL VENOUS BLD VENIPUNCTURE: CPT

## 2020-04-07 PROCEDURE — 99232 SBSQ HOSP IP/OBS MODERATE 35: CPT | Performed by: NURSE PRACTITIONER

## 2020-04-07 PROCEDURE — 1240000000 HC EMOTIONAL WELLNESS R&B

## 2020-04-07 PROCEDURE — 6370000000 HC RX 637 (ALT 250 FOR IP): Performed by: PSYCHIATRY & NEUROLOGY

## 2020-04-07 PROCEDURE — 6370000000 HC RX 637 (ALT 250 FOR IP): Performed by: INTERNAL MEDICINE

## 2020-04-07 PROCEDURE — 6370000000 HC RX 637 (ALT 250 FOR IP): Performed by: NURSE PRACTITIONER

## 2020-04-07 PROCEDURE — 2580000003 HC RX 258: Performed by: INTERNAL MEDICINE

## 2020-04-07 RX ADMIN — VENLAFAXINE HYDROCHLORIDE 75 MG: 75 CAPSULE, EXTENDED RELEASE ORAL at 08:43

## 2020-04-07 RX ADMIN — TRAZODONE HYDROCHLORIDE 25 MG: 50 TABLET ORAL at 22:33

## 2020-04-07 RX ADMIN — SODIUM CHLORIDE: 9 INJECTION, SOLUTION INTRAVENOUS at 02:08

## 2020-04-07 RX ADMIN — DONEPEZIL HYDROCHLORIDE 5 MG: 5 TABLET, FILM COATED ORAL at 20:42

## 2020-04-07 RX ADMIN — LEVOTHYROXINE SODIUM 50 MCG: 0.05 TABLET ORAL at 06:30

## 2020-04-07 RX ADMIN — ARIPIPRAZOLE 2 MG: 2 TABLET ORAL at 08:43

## 2020-04-07 NOTE — GROUP NOTE
Group Therapy Note    Date: 4/6/2020    Group Start Time: 1930  Group End Time: 2030  Group Topic: Relaxation    [facility] [address]        Group Therapy Note    Attendees: 5/9             Signature:  Amy Baird

## 2020-04-07 NOTE — PROGRESS NOTES
Group Therapy Note    Date: 4/7/2020  Start Time: 1:30  End Time:   2:15  Number of Participants: 5    Type of Group: Psychotherapy    Wellness Binder Information  Module Name:    Session Number:      Patient's Goal:  Gain insight into interpersonal relationships by interacting with otherss    Notes: Pt was able to express strong feeling regarding coping with  stressful relationship by learning to improve communications.     Status After Intervention:  Improved    Participation Level: Interactive    Participation Quality: Attentive and Sharing      Speech: None      Thought Process/Content: Logical      Affective Functioning: Congruent      Mood: anxious and depressed      Level of consciousness:  Alert, Oriented x4 and Attentive      Response to Learning: Able to verbalize/acknowledge new learning      Endings: None Reported    Modes of Intervention: Support and Socialization      Discipline Responsible: /Counselor      Signature:  RADHA Farris

## 2020-04-07 NOTE — PROGRESS NOTES
(Temporal)   Resp 18   Ht 6' 2\" (1.88 m)   Wt 185 lb 12.8 oz (84.3 kg)   SpO2 100%   BMI 23.86 kg/m²   General appearance: appears stated age, in no acute distress  Skin:  No rashes on exposed skin  HEENT: Head: Normocephalic, no lesions, without obvious abnormality. Neck: No JVD  Lungs: clear b/l  Heart: RRR, no rubs  Abdomen: non-tender, non-distended  Extremities: no edema  Neurologic: Mental status: Alert, oriented, thought content appropriate    Assessment:   Patient Active Problem List:     Hypertriglyceridemia     Recurrent major depressive disorder (HCC)     Severe episode of recurrent major depressive disorder, without psychotic features ([facility] [address]     Suicidal ideation     Mixed hyperlipidemia     Suicide attempt by substance overdose ([facility] [address]     Metabolic acidosis due to ethylene glycol     Ethylene glycol poisoning     Drug overdoses, intentional self-harm, initial encounter ([facility] [address]     DESIREE (acute kidney injury) ([facility] [address]    Plan:   Assessment/Plans     1. DESIREE  Stage 1 due to ethylene glycol toxicity  Baseline cr 1.1  nonoliguric  Hemodialysis 3/29 and 3/30, temp line removed 3/30  Serum cr remains above baseline, has plateaued @ 2.2   Stop IVF     2. HAG metabolic acidosis with osmolar gap  strongly suggestive of toxic alcohol ingestion  CO2 31--> 28-->29-->25, off  IV bicarb drip   Resolved     3. Hypertension   BP at/near target of below 130/80  controlled not requiring any antihypertensive now     4. Attempted suicide  On Psych floor       Discharge planning, follow-up with office in 2-3 weeks, BMP weekly x2    RITA Irving-CNS    Patient seen and examined all key components of the physical performed independently , case discussed with NP, all pertinent labs and radiologic tests personally reviewed agree with above.       Ana Cristina Almanzar MD

## 2020-04-07 NOTE — PLAN OF CARE
Denies SI/HI/AV hallucinations. Denies any depression or anxiety at this time. Social with peers. Compliant with scheduled medications. IV patent, NS 0.9% infusing at 100ml/hr per order. IV site dry, intact, no redness or edema noted. Denies any pain. Will continue to monitor and support.      Problem: Depressive Behavior With or Without Suicide Precautions:  Goal: Able to verbalize and/or display a decrease in depressive symptoms  Description: Able to verbalize and/or display a decrease in depressive symptoms  7/8/1192 1692 by Tyra Henley RN  Outcome: Ongoing     Problem: Depressive Behavior With or Without Suicide Precautions:  Goal: Absence of self-harm  Description: Absence of self-harm  Outcome: Ongoing       Problem: Falls - Risk of:  Goal: Will remain free from falls  Description: Will remain free from falls  3/4/7890 2565 by Tyra Henley RN  Outcome: Ongoing

## 2020-04-07 NOTE — PROGRESS NOTES
are:  [] Well controlled  [x] Improving  [] Worsening  [] No change      Diagnosis:  Principal Problem:    Severe episode of recurrent major depressive disorder, without psychotic features ([facility] [address]  Resolved Problems:    * No resolved hospital problems. *      LABS:    No results for input(s): WBC, HGB, PLT in the last 72 hours. Recent Labs     04/05/20  0622 04/06/20  0548 04/07/20  0501    146 142   K 4.4 4.6 4.2   * 112* 106   CO2 23 24 24   BUN 23 23 22   CREATININE 2.3* 2.2* 2.2*   GLUCOSE 87 87 88     No results for input(s): BILITOT, ALKPHOS, AST, ALT in the last 72 hours. Lab Results   Component Value Date    LABAMPH NOT DETECTED 03/28/2020    LABAMPH NOT DETECTED 09/13/2011    BARBSCNU NOT DETECTED 03/28/2020    LABBENZ NOT DETECTED 03/28/2020    LABBENZ NOT DETECTED 09/13/2011    CANNAB NOT DETECTED  09/13/2011    LABMETH NOT DETECTED 03/28/2020    OPIATESCREENURINE NOT DETECTED 03/28/2020    PHENCYCLIDINESCREENURINE NOT DETECTED 03/28/2020    ETOH <10 03/28/2020     Lab Results   Component Value Date    TSH 2.790 03/28/2020     Lab Results   Component Value Date    LITHIUM 0.13 (L) 08/10/2018     No results found for: VALPROATE, CBMZ        Treatment Plan:  Reviewed current Medications with the patient. Abilify 2 mg daily  Aricept 5 mg nightly  Effexor XR 75 mg daily  Risks, benefits, side effects, drug-to-drug interactions and alternatives to treatment were discussed. Collateral information: To be obtained by  to insure patient safety on discharge  CD evaluation  Encourage patient to attend group and other milieu activities.   Discharge planning discussed with the patient and treatment team.    PSYCHOTHERAPY/COUNSELING:  [x] Therapeutic interview  [x] Supportive  [] CBT  [] Ongoing  [] Other    [x] Patient continues to need, on a daily basis, active treatment furnished directly by or requiring the supervision of inpatient psychiatric personnel      Anticipated Length of stay: 5

## 2020-04-08 LAB
ANION GAP SERPL CALCULATED.3IONS-SCNC: 15 MMOL/L (ref 7–16)
BUN BLDV-MCNC: 21 MG/DL (ref 8–23)
CALCIUM SERPL-MCNC: 9.3 MG/DL (ref 8.6–10.2)
CHLORIDE BLD-SCNC: 109 MMOL/L (ref 98–107)
CO2: 25 MMOL/L (ref 22–29)
CREAT SERPL-MCNC: 2.1 MG/DL (ref 0.7–1.2)
GFR AFRICAN AMERICAN: 38
GFR NON-AFRICAN AMERICAN: 31 ML/MIN/1.73
GLUCOSE BLD-MCNC: 87 MG/DL (ref 74–99)
POTASSIUM SERPL-SCNC: 4.5 MMOL/L (ref 3.5–5)
SODIUM BLD-SCNC: 149 MMOL/L (ref 132–146)

## 2020-04-08 PROCEDURE — 80048 BASIC METABOLIC PNL TOTAL CA: CPT

## 2020-04-08 PROCEDURE — 6370000000 HC RX 637 (ALT 250 FOR IP): Performed by: NURSE PRACTITIONER

## 2020-04-08 PROCEDURE — 36415 COLL VENOUS BLD VENIPUNCTURE: CPT

## 2020-04-08 PROCEDURE — 1240000000 HC EMOTIONAL WELLNESS R&B

## 2020-04-08 PROCEDURE — 99232 SBSQ HOSP IP/OBS MODERATE 35: CPT | Performed by: NURSE PRACTITIONER

## 2020-04-08 PROCEDURE — 6370000000 HC RX 637 (ALT 250 FOR IP): Performed by: INTERNAL MEDICINE

## 2020-04-08 RX ADMIN — VENLAFAXINE HYDROCHLORIDE 75 MG: 75 CAPSULE, EXTENDED RELEASE ORAL at 08:30

## 2020-04-08 RX ADMIN — DONEPEZIL HYDROCHLORIDE 5 MG: 5 TABLET, FILM COATED ORAL at 21:08

## 2020-04-08 RX ADMIN — LEVOTHYROXINE SODIUM 50 MCG: 0.05 TABLET ORAL at 06:38

## 2020-04-08 RX ADMIN — ARIPIPRAZOLE 2 MG: 2 TABLET ORAL at 08:30

## 2020-04-08 ASSESSMENT — PAIN SCALES - GENERAL
PAINLEVEL_OUTOF10: 0
PAINLEVEL_OUTOF10: 0

## 2020-04-08 NOTE — PROGRESS NOTES
Progress Note  4/8/2020 12:43 PM  Subjective:   Admit Date: 4/2/2020  PCP: Armin Sauceda MD  Interval History: Patient states he is feeling well, has no complaints. Hoping to go home soon, misses wife and dogs. 4/7/20: Patient up in patient lounge. States he feels good and would like to go home. 4/8/20: States he feels well. IVF discontinued yesterday, Na+ increased to 149 today. Advised to drink more fluids. Diet: DIET GENERAL; Safety Tray; Safety Tray (Disposables)    Data:   Scheduled Meds:   venlafaxine  75 mg Oral Daily    sodium chloride flush  10 mL Intravenous 2 times per day    ARIPiprazole  2 mg Oral Daily    donepezil  5 mg Oral Nightly    levothyroxine  50 mcg Oral Daily     Continuous Infusions:    PRN Meds:haloperidol lactate **OR** haloperidol, traZODone, aluminum & magnesium hydroxide-simethicone, acetaminophen **OR** acetaminophen, magnesium hydroxide, promethazine **OR** ondansetron, sodium chloride flush  No intake/output data recorded. I/O this shift:  In: 360 [P.O.:360]  Out: -     Intake/Output Summary (Last 24 hours) at 4/8/2020 1243  Last data filed at 4/8/2020 0912  Gross per 24 hour   Intake 360 ml   Output --   Net 360 ml     CBC: No results for input(s): WBC, HGB, PLT in the last 72 hours. BMP:    Recent Labs     04/06/20  0548 04/07/20  0501 04/08/20  0641    142 149*   K 4.6 4.2 4.5   * 106 109*   CO2 24 24 25   BUN 23 22 21   CREATININE 2.2* 2.2* 2.1*   GLUCOSE 87 88 87     Hepatic: No results for input(s): AST, ALT, ALB, BILITOT, ALKPHOS in the last 72 hours. Troponin: No results for input(s): TROPONINI in the last 72 hours. BNP: No results for input(s): BNP in the last 72 hours. Lipids: No results for input(s): CHOL, HDL in the last 72 hours.     Invalid input(s): LDLCALCU  ABGs:   Lab Results   Component Value Date    PO2ART 92.4 03/28/2020    EVU7YXQ 32.7 03/28/2020     INR: No results for input(s): INR in the last 72 hours.    -----------------------------------------------------------------  RAD: No results found. Objective:   Vitals: BP (!) 141/87   Pulse 87   Temp 97.2 °F (36.2 °C) (Temporal)   Resp 16   Ht 6' 2\" (1.88 m)   Wt 185 lb 12.8 oz (84.3 kg)   SpO2 96%   BMI 23.86 kg/m²   General appearance: appears stated age, in no acute distress  Skin:  No rashes on exposed skin  HEENT: Head: Normocephalic, no lesions, without obvious abnormality. Neck: No JVD  Lungs: clear b/l  Heart: RRR, no rubs  Abdomen: non-tender, non-distended  Extremities: no edema  Neurologic: Mental status: Alert, oriented, thought content appropriate    Assessment:   Patient Active Problem List:     Hypertriglyceridemia     Recurrent major depressive disorder (HCC)     Severe episode of recurrent major depressive disorder, without psychotic features ([facility] [address]     Suicidal ideation     Mixed hyperlipidemia     Suicide attempt by substance overdose ([facility] [address]     Metabolic acidosis due to ethylene glycol     Ethylene glycol poisoning     Drug overdoses, intentional self-harm, initial encounter ([facility] [address]     [facility]    Plan:   Assessment/Plans     1. DESIREE  Stage 1 due to ethylene glycol toxicity  Baseline cr 1.1  nonoliguric  Hemodialysis 3/29 and 3/30, temp line removed 3/30  Serum cr remains above baseline, has plateaued @ 0.0-6.9  4/7 IVF discontinued, Na+ increased to 149, increased po fluids encouraged.     2. HAG metabolic acidosis with osmolar gap  strongly suggestive of toxic alcohol ingestion  CO2 31--> 28-->29-->25, off  IV bicarb drip   Resolved     3. Hypertension   BP at/near target of below 130/80  controlled not requiring any antihypertensive now     4.  Attempted suicide  On Psych floor       Discharge planning, follow-up with office in 2-3 weeks, BMP weekly x2    RITA Pleitez-CNS    Patient seen and examined all key components of the physical performed independently , case discussed with NP, all pertinent labs and radiologic tests personally reviewed agree with above.       Malik Wen MD

## 2020-04-09 VITALS
RESPIRATION RATE: 16 BRPM | BODY MASS INDEX: 23.85 KG/M2 | OXYGEN SATURATION: 96 % | SYSTOLIC BLOOD PRESSURE: 134 MMHG | TEMPERATURE: 98.5 F | HEIGHT: 74 IN | HEART RATE: 85 BPM | WEIGHT: 185.8 LBS | DIASTOLIC BLOOD PRESSURE: 90 MMHG

## 2020-04-09 LAB
ANION GAP SERPL CALCULATED.3IONS-SCNC: 15 MMOL/L (ref 7–16)
BUN BLDV-MCNC: 24 MG/DL (ref 8–23)
CALCIUM SERPL-MCNC: 9.5 MG/DL (ref 8.6–10.2)
CHLORIDE BLD-SCNC: 105 MMOL/L (ref 98–107)
CO2: 25 MMOL/L (ref 22–29)
CREAT SERPL-MCNC: 2.2 MG/DL (ref 0.7–1.2)
GFR AFRICAN AMERICAN: 36
GFR NON-AFRICAN AMERICAN: 30 ML/MIN/1.73
GLUCOSE BLD-MCNC: 88 MG/DL (ref 74–99)
POTASSIUM SERPL-SCNC: 4.6 MMOL/L (ref 3.5–5)
SODIUM BLD-SCNC: 145 MMOL/L (ref 132–146)

## 2020-04-09 PROCEDURE — 80048 BASIC METABOLIC PNL TOTAL CA: CPT

## 2020-04-09 PROCEDURE — 6370000000 HC RX 637 (ALT 250 FOR IP): Performed by: INTERNAL MEDICINE

## 2020-04-09 PROCEDURE — 6370000000 HC RX 637 (ALT 250 FOR IP): Performed by: NURSE PRACTITIONER

## 2020-04-09 PROCEDURE — 99239 HOSP IP/OBS DSCHRG MGMT >30: CPT | Performed by: NURSE PRACTITIONER

## 2020-04-09 PROCEDURE — 36415 COLL VENOUS BLD VENIPUNCTURE: CPT

## 2020-04-09 RX ORDER — ARIPIPRAZOLE 2 MG/1
2 TABLET ORAL DAILY
Qty: 30 TABLET | Refills: 0 | Status: SHIPPED | OUTPATIENT
Start: 2020-04-09 | End: 2021-07-11

## 2020-04-09 RX ORDER — VENLAFAXINE HYDROCHLORIDE 75 MG/1
75 CAPSULE, EXTENDED RELEASE ORAL DAILY
Qty: 30 CAPSULE | Refills: 0 | Status: SHIPPED | OUTPATIENT
Start: 2020-04-10 | End: 2021-07-11

## 2020-04-09 RX ORDER — DONEPEZIL HYDROCHLORIDE 5 MG/1
5 TABLET, FILM COATED ORAL NIGHTLY
Qty: 30 TABLET | Refills: 0 | Status: SHIPPED | OUTPATIENT
Start: 2020-04-09 | End: 2020-04-24

## 2020-04-09 RX ADMIN — VENLAFAXINE HYDROCHLORIDE 75 MG: 75 CAPSULE, EXTENDED RELEASE ORAL at 09:00

## 2020-04-09 RX ADMIN — ARIPIPRAZOLE 2 MG: 2 TABLET ORAL at 09:00

## 2020-04-09 RX ADMIN — LEVOTHYROXINE SODIUM 50 MCG: 0.05 TABLET ORAL at 06:21

## 2020-04-09 ASSESSMENT — PAIN SCALES - GENERAL: PAINLEVEL_OUTOF10: 0

## 2020-04-09 NOTE — PROGRESS NOTES
Progress Note  4/9/2020 11:07 AM  Subjective:   Admit Date: 4/2/2020  PCP: Christian Arambula MD  Interval History: Patient states he is feeling well, has no complaints. Hoping to go home soon, misses wife and dogs. 4/7/20: Patient up in patient lounge. States he feels good and would like to go home. 4/9/20:  Pt seen & examined, sitting up in dayroom. He states he feels good and thinks he is going home today. Brennan Chaney his wife took the day off so he could be discharged. He reports good appetite. No cp, sob, abd pain, nausea, or edema. Discussed weekly labs X 2 weeks and a follow up appt in about 3 weeks, probably via telehealth. Diet: DIET GENERAL; Safety Tray; Safety Tray (Disposables)    Data:   Scheduled Meds:   venlafaxine  75 mg Oral Daily    sodium chloride flush  10 mL Intravenous 2 times per day    ARIPiprazole  2 mg Oral Daily    donepezil  5 mg Oral Nightly    levothyroxine  50 mcg Oral Daily     Continuous Infusions:    PRN Meds:haloperidol lactate **OR** haloperidol, traZODone, aluminum & magnesium hydroxide-simethicone, acetaminophen **OR** acetaminophen, magnesium hydroxide, promethazine **OR** ondansetron, sodium chloride flush  I/O last 3 completed shifts: In: 360 [P.O.:360]  Out: -   No intake/output data recorded. No intake or output data in the 24 hours ending 04/09/20 1107  CBC: No results for input(s): WBC, HGB, PLT in the last 72 hours. BMP:    Recent Labs     04/07/20  0501 04/08/20  0641 04/09/20  0621    149* 145   K 4.2 4.5 4.6    109* 105   CO2 24 25 25   BUN 22 21 24*   CREATININE 2.2* 2.1* 2.2*   GLUCOSE 88 87 88     Hepatic: No results for input(s): AST, ALT, ALB, BILITOT, ALKPHOS in the last 72 hours. Troponin: No results for input(s): TROPONINI in the last 72 hours. BNP: No results for input(s): BNP in the last 72 hours. Lipids: No results for input(s): CHOL, HDL in the last 72 hours.     Invalid input(s): LDLCALCU  ABGs:   Lab Results   Component Value

## 2020-04-09 NOTE — GROUP NOTE
Group Therapy Note    Date: 4/9/2020    Group Start Time: 1000  Group End Time: 1050  Group Topic: Psychoeducation    [facility]        Group Therapy Note    Attendees: 8         Patient's Goal:  \" Verify transportation home\". Notes:  Positive participation in wellness discussion. Willing to exercise/strecth to personal ability. Pleasant and social with peers. Status After Intervention:  Improved    Participation Level:  Active Listener and Interactive    Participation Quality: Appropriate, Attentive and Sharing      Speech:  normal      Thought Process/Content: Logical      Affective Functioning: Congruent      Mood: euthymic      Level of consciousness:  Alert and Attentive      Response to Learning: Capable of insight, Able to change behavior and Progressing to goal      Endings: None Reported    Modes of Intervention: Education, Support, Socialization and Exploration      Discipline Responsible: Psychoeducational Specialist      Signature:  Andrea Membreno

## 2020-04-09 NOTE — DISCHARGE SUMMARY
Gets together: Not on file     Attends Yars[employer] service: Not on file     Active member of club or organization: Not on file     Attends meetings of clubs or organizations: Not on file     Relationship status: Not on file    Intimate partner violence     Fear of current or ex partner: Not on file     Emotionally abused: Not on file     Physically abused: Not on file     Forced sexual activity: Not on file   Other Topics Concern    Not on file   Social History Narrative    Not on file       MEDICATIONS:    Current Facility-Administered Medications:     venlafaxine (EFFEXOR XR) extended release capsule 75 mg, 75 mg, Oral, Daily, Marcelle Heath, APRN - CNP, 75 mg at 04/09/20 0900    haloperidol lactate (HALDOL) injection 3 mg, 3 mg, Intramuscular, Q6H PRN **OR** haloperidol (HALDOL) tablet 3 mg, 3 mg, Oral, Q6H PRN, Marifer Katz MD    traZODone (DESYREL) tablet 25 mg, 25 mg, Oral, Nightly PRN, Marifer Katz MD, 25 mg at 04/07/20 2233    aluminum & magnesium hydroxide-simethicone (MAALOX) 200-200-20 MG/5ML suspension 30 mL, 30 mL, Oral, PRN, Marifer Katz MD    acetaminophen (TYLENOL) tablet 650 mg, 650 mg, Oral, Q6H PRN **OR** acetaminophen (TYLENOL) suppository 650 mg, 650 mg, Rectal, Q6H PRN, Lian Vazquez MD    magnesium hydroxide (MILK OF MAGNESIA) 400 MG/5ML suspension 30 mL, 30 mL, Oral, Daily PRN, Lian Vazquez MD    promethazine (PHENERGAN) tablet 12.5 mg, 12.5 mg, Oral, Q6H PRN **OR** ondansetron (ZOFRAN) injection 4 mg, 4 mg, Intravenous, Q6H PRN, Lian Vazquez MD    sodium chloride flush 0.9 % injection 10 mL, 10 mL, Intravenous, 2 times per day, Lian Vazquez MD, Stopped at 04/06/20 0900    sodium chloride flush 0.9 % injection 10 mL, 10 mL, Intravenous, PRN, Lian Vazquez MD, 10 mL at 04/04/20 2139    ARIPiprazole (ABILIFY) tablet 2 mg, 2 mg, Oral, Daily, Lian Vazquez MD, 2 mg at 04/09/20 0900    donepezil (ARICEPT) tablet 5 mg, 5 mg, Oral, Nightly, Lian Vazquez MD, 5 mg at phone.  I spoke with the patient's wife this afternoon and she stated no concerns for her  and stated that he has no access to guns or weapons. She also told me that she has been talking with her  during his stay at the hospital and that they want to get marriage counseling to work through their issues. I additionally informed the patient's wife that that he has shown some slight cognitive decline and that it would be advised for him not to drive at this time due to the cognitive decline. The patient's wife showed willingness to have her  back home and stated that she will be vigilant for any mood disturbances or any change in the patient's mood that would indicate that he is feeling depressed or wanting to hurt him herself. The patient's wife stated that she will call medical services immediately if her  starts to act off. The patient stated great gratitude to the help he received and stated that he learned many new coping skills through going to group and that he will be able to now handle his life situations. The patient will be following up psychiatrically at [facility] and appointments have been set up for him. The patient has no more suicidal, homicidal, psychotic, or manic symptomology. The patient received the required treatment with medication, participated in group milieu, remained engaged in unit activities, and learned appropriate coping skills. The patient was seen watching TV, playing games, socializing with peers, and calling friends and family. There were no mention or gestures of self-harm or harm to others. The patient's mental status returned to baseline. Treatment team felt that the patient has obtained maximal benefit out of this hospitalization does not meet the criteria for inpatient hospitalization anymore. However the patient will continue to benefit from outpatient and follow-up treatment to maintain stability.   Collateral information was obtained 04/07/20  0501 04/08/20  0641 04/09/20  0621    149* 145   K 4.2 4.5 4.6    109* 105   CO2 24 25 25   BUN 22 21 24*   CREATININE 2.2* 2.1* 2.2*   GLUCOSE 88 87 88     No results for input(s): BILITOT, ALKPHOS, AST, ALT in the last 72 hours. Lab Results   Component Value Date    LABAMPH NOT DETECTED 03/28/2020    LABAMPH NOT DETECTED 09/13/2011    BARBSCNU NOT DETECTED 03/28/2020    LABBENZ NOT DETECTED 03/28/2020    LABBENZ NOT DETECTED 09/13/2011    CANNAB NOT DETECTED  09/13/2011    LABMETH NOT DETECTED 03/28/2020    OPIATESCREENURINE NOT DETECTED 03/28/2020    PHENCYCLIDINESCREENURINE NOT DETECTED 03/28/2020    ETOH <10 03/28/2020     Lab Results   Component Value Date    TSH 2.790 03/28/2020     Lab Results   Component Value Date    LITHIUM 0.13 (L) 08/10/2018     No results found for: VALPROATE, CBMZ    RISK ASSESSMENT AT DISCHARGE: Low risk for suicide and homicide. Treatment Plan:  Reviewed current Medications with the patient. Education provided on the complaince with treatment. Risks, benefits, side effects, drug-to-drug interactions and alternatives to treatment were discussed. Encourage patient to attend outpatient follow up appointment and therapy. Patient was advised to call the outpatient provider, visit the nearest ED or call 911 if symptoms are not manageable. Patient's family member was contacted prior to the discharge. Medication List      CHANGE how you take these medications    donepezil 5 MG tablet  Commonly known as:  ARICEPT  Take 1 tablet by mouth nightly  What changed:  See the new instructions.      venlafaxine 75 MG extended release capsule  Commonly known as:  EFFEXOR XR  Take 1 capsule by mouth daily  Start taking on:  April 10, 2020  What changed:    · medication strength  · how much to take        CONTINUE taking these medications    ARIPiprazole 2 MG tablet  Commonly known as:  ABILIFY  Take 1 tablet by mouth daily     FOLIC ACID PO

## 2020-04-09 NOTE — PROGRESS NOTES
Situation  Attention:Normal: Yes  Attention: Distractible  Thought Processes: Circumstantial  Thought Content:Normal: Yes  Thought Content: Other(See Comment)  Hallucinations: None  Delusions: No  Memory:Normal: Yes  Memory: Poor Recent  Insight and Judgment: Yes(Improving)  Insight and Judgment: Poor Insight  Present Suicidal Ideation: No  Present Homicidal Ideation: No    Daily Assessment Last Entry:   Daily Sleep (WDL): Exceptions to WDL         Patient Currently in Pain: Denies  Daily Nutrition (WDL): Within Defined Limits    Patient Monitoring:  Frequency of Checks: 4 times per hour, close    Psychiatric Symptoms:   Depression Symptoms  Depression Symptoms: No problems reported or observed. Anxiety Symptoms  Anxiety Symptoms: Generalized  Maribeth Symptoms  Maribeth Symptoms: No problems reported or observed. Psychosis Symptoms  Hallucination Type: No problems reported or observed. Delusion Type: No problems reported or observed. Suicide Risk CSSR-S:  1) Within the past month, have you wished you were dead or wished you could go to sleep and not wake up? : Yes  2) Have you actually had any thoughts of killing yourself? : Yes  3) Have you been thinking about how you might kill yourself? : Yes  5) Have you started to work out or worked out the details of how to kill yourself? Do you intend to carry out this plan? : Yes  6) Have you ever done anything, started to do anything, or prepared to do anything to end your life?: Yes  Change in Result No Change in Plan of care No    EDUCATION:   Learner Progress Toward Treatment Goals: Reviewed results and recommendations of this team and Reviewed goals and plan of care    Method: Small group    Outcome: Verbalized understanding    PATIENT GOALS: \"Verify transportation home\"    PLAN/TREATMENT RECOMMENDATIONS UPDATE: Take prescribed medications, attend/participate in groups. Continue to provide emotional support to patient.      GOALS UPDATE:  Time frame for Short-Term

## 2020-04-09 NOTE — PLAN OF CARE
Denies SI/HI/AV hallucinations. Denies depression/anxiety at this time. Compliant with medications. Pleasant and cooperative. Social with peers. Will continue to monitor and support.      Problem: Depressive Behavior With or Without Suicide Precautions:  Goal: Ability to disclose and discuss suicidal ideas will improve  Description: Ability to disclose and discuss suicidal ideas will improve  3/9/9860 7809 by Juan Bernardo RN  Outcome: Ongoing     Problem: Depressive Behavior With or Without Suicide Precautions:  Goal: Able to verbalize support systems  Description: Able to verbalize support systems  Outcome: Ongoing     Problem: Depressive Behavior With or Without Suicide Precautions:  Goal: Absence of self-harm  Description: Absence of self-harm  0/6/2745 7959 by Juan Bernardo RN  Outcome: Ongoing     Problem: Falls - Risk of:  Goal: Will remain free from falls  Description: Will remain free from falls  Outcome: Ongoing

## 2020-04-14 RX ORDER — DONEPEZIL HYDROCHLORIDE 5 MG/1
5 TABLET, FILM COATED ORAL NIGHTLY
Qty: 30 TABLET | Refills: 0 | OUTPATIENT
Start: 2020-04-14 | End: 2020-05-14

## 2020-04-24 RX ORDER — DONEPEZIL HYDROCHLORIDE 5 MG/1
5 TABLET, FILM COATED ORAL NIGHTLY
Qty: 30 TABLET | Refills: 0 | Status: SHIPPED
Start: 2020-04-24 | End: 2021-04-15

## 2020-06-06 ENCOUNTER — APPOINTMENT (OUTPATIENT)
Dept: GENERAL RADIOLOGY | Age: 70
End: 2020-06-06
Payer: COMMERCIAL

## 2020-06-06 ENCOUNTER — NURSE TRIAGE (OUTPATIENT)
Dept: OTHER | Facility: CLINIC | Age: 70
End: 2020-06-06

## 2020-06-06 ENCOUNTER — HOSPITAL ENCOUNTER (EMERGENCY)
Age: 70
Discharge: HOME | End: 2020-06-06
Payer: COMMERCIAL

## 2020-06-06 VITALS
SYSTOLIC BLOOD PRESSURE: 122 MMHG | BODY MASS INDEX: 24.38 KG/M2 | WEIGHT: 190 LBS | RESPIRATION RATE: 14 BRPM | DIASTOLIC BLOOD PRESSURE: 82 MMHG | TEMPERATURE: 97.2 F | OXYGEN SATURATION: 96 % | HEART RATE: 87 BPM | HEIGHT: 74 IN

## 2020-06-06 LAB
ALBUMIN SERPL-MCNC: 4.4 G/DL (ref 3.5–5.2)
ALP BLD-CCNC: 95 U/L (ref 40–129)
ALT SERPL-CCNC: 10 U/L (ref 0–40)
ANION GAP SERPL CALCULATED.3IONS-SCNC: 9 MMOL/L (ref 7–16)
AST SERPL-CCNC: 17 U/L (ref 0–39)
BASOPHILS ABSOLUTE: 0.02 E9/L (ref 0–0.2)
BASOPHILS RELATIVE PERCENT: 0.3 % (ref 0–2)
BILIRUB SERPL-MCNC: 0.5 MG/DL (ref 0–1.2)
BUN BLDV-MCNC: 16 MG/DL (ref 8–23)
CALCIUM SERPL-MCNC: 9.4 MG/DL (ref 8.6–10.2)
CHLORIDE BLD-SCNC: 104 MMOL/L (ref 98–107)
CO2: 26 MMOL/L (ref 22–29)
CREAT SERPL-MCNC: 1.5 MG/DL (ref 0.7–1.2)
EOSINOPHILS ABSOLUTE: 0.11 E9/L (ref 0.05–0.5)
EOSINOPHILS RELATIVE PERCENT: 1.8 % (ref 0–6)
GFR AFRICAN AMERICAN: 56
GFR NON-AFRICAN AMERICAN: 46 ML/MIN/1.73
GLUCOSE BLD-MCNC: 84 MG/DL (ref 74–99)
HCT VFR BLD CALC: 40.1 % (ref 37–54)
HEMOGLOBIN: 13.4 G/DL (ref 12.5–16.5)
IMMATURE GRANULOCYTES #: 0.03 E9/L
IMMATURE GRANULOCYTES %: 0.5 % (ref 0–5)
LYMPHOCYTES ABSOLUTE: 1.24 E9/L (ref 1.5–4)
LYMPHOCYTES RELATIVE PERCENT: 19.7 % (ref 20–42)
MCH RBC QN AUTO: 31.5 PG (ref 26–35)
MCHC RBC AUTO-ENTMCNC: 33.4 % (ref 32–34.5)
MCV RBC AUTO: 94.1 FL (ref 80–99.9)
MONOCYTES ABSOLUTE: 0.57 E9/L (ref 0.1–0.95)
MONOCYTES RELATIVE PERCENT: 9.1 % (ref 2–12)
NEUTROPHILS ABSOLUTE: 4.31 E9/L (ref 1.8–7.3)
NEUTROPHILS RELATIVE PERCENT: 68.6 % (ref 43–80)
PDW BLD-RTO: 13.8 FL (ref 11.5–15)
PLATELET # BLD: 211 E9/L (ref 130–450)
PMV BLD AUTO: 9.4 FL (ref 7–12)
POTASSIUM SERPL-SCNC: 4.2 MMOL/L (ref 3.5–5)
PRO-BNP: 52 PG/ML (ref 0–125)
RBC # BLD: 4.26 E12/L (ref 3.8–5.8)
SARS-COV-2, NAAT: NOT DETECTED
SODIUM BLD-SCNC: 139 MMOL/L (ref 132–146)
TOTAL PROTEIN: 7 G/DL (ref 6.4–8.3)
TROPONIN: <0.01 NG/ML (ref 0–0.03)
WBC # BLD: 6.3 E9/L (ref 4.5–11.5)

## 2020-06-06 PROCEDURE — 84484 ASSAY OF TROPONIN QUANT: CPT

## 2020-06-06 PROCEDURE — U0002 COVID-19 LAB TEST NON-CDC: HCPCS

## 2020-06-06 PROCEDURE — 80053 COMPREHEN METABOLIC PANEL: CPT

## 2020-06-06 PROCEDURE — 83880 ASSAY OF NATRIURETIC PEPTIDE: CPT

## 2020-06-06 PROCEDURE — 85025 COMPLETE CBC W/AUTO DIFF WBC: CPT

## 2020-06-06 PROCEDURE — 71046 X-RAY EXAM CHEST 2 VIEWS: CPT

## 2020-06-06 PROCEDURE — 99285 EMERGENCY DEPT VISIT HI MDM: CPT

## 2020-06-06 PROCEDURE — 93005 ELECTROCARDIOGRAM TRACING: CPT | Performed by: PHYSICIAN ASSISTANT

## 2020-06-06 RX ORDER — BENZONATATE 100 MG/1
200 CAPSULE ORAL 3 TIMES DAILY PRN
Qty: 30 CAPSULE | Refills: 0 | Status: SHIPPED | OUTPATIENT
Start: 2020-06-06 | End: 2020-06-13

## 2020-06-06 NOTE — ED NOTES
Prior to ambulation:  HR-92  02- 98%    During ambulation of approx 100 ft. HR 98  O2 - 99%    No complaints of dyspnea/distress observed.      Melissa Caballero RN  06/06/20 1262

## 2020-06-06 NOTE — ED PROVIDER NOTES
[facility]     Department of Emergency Medicine   ED  Provider Note  Admit Date/RoomTime: 6/6/2020  2:57 PM  ED Room: 02/02    Chief Complaint:   Covid Testing (pt states he wants tested for COVID, \"I think I have some shortness of breath today\") and Fatigue (started yesterday, chest congestion )    History of Present Illness      Leoncio Quinones is a 71 y.o. old male who presents to the ED for dry cough, generalized fatigue and chest congestion that began yesterday. Patient states he thought he may have had some shortness of breath today but denies it at this exam.  He denies any chest pain. He has no difficulty with breathing or swallowing. He denies headache, dizziness, fever/chills, neck pain, back pain, limb pain or swelling, abdominal pain, nausea, vomiting, sore throat, or recent travel. Patient is alert and oriented x3 and in no apparent distress at this exam.  He is 98% on room air with unlabored breathing. He is in absolutely no respiratory distress. Patient has no conversational dyspnea. Patient denies any known sick contacts. ROS   Pertinent positives and negatives are stated within HPI, all other systems reviewed and are negative. Past Medical History:  has a past medical history of Cancer [facility]), Degeneration of lumbar intervertebral disc, Depression, HIGH CHOLESTEROL, Hypertriglyceridemia, Major depressive disorder, recurrent episode, severe ([address] MDD (major depressive disorder), recurrent severe, without psychosis ([address] Overactive bladder, Prostate [address] [address] Suicidal ideations, and Urinary incontinence. Past Surgical History:  has a past surgical history that includes other surgical history (02/21/2014); back surgery; Prostatectomy; Appendectomy; Tonsillectomy; and Colonoscopy. Social History:  reports that he has never smoked. He has never used smokeless tobacco. He reports that he does not drink alcohol or use drugs.     Family History: family history includes Cancer in his brother and sister; Depression in his brother and sister. The patients home medications have been reviewed. Allergies: Ketamine  Allergies have been reviewed with patient. Physical Exam   VS:  /85   Pulse 89   Temp 97.1 °F (36.2 °C) (Oral)   Resp 14   Ht 6' 2\" (1.88 m)   Wt 190 lb (86.2 kg)   SpO2 96%   BMI 24.39 kg/m²      Oxygen Saturation Interpretation: Normal.    Constitutional:  Alert, development consistent with age. NAD  Eyes: EOMI, non-injected conjunctiva   Ears:  External Ears: Bilateral normal.              TM's & External Canals:  normal TM's and external ear canals both ears. Throat: No erythema or exudates noted. , uvula midline, Airway patent     Neck/Lymphatic: Supple. There is no cervical node tenderness. Non-tender with no meningeal signs   Respiratory:  Clear to auscultation and breath sounds equal, good air flow. No respiratory distress  CV: Regular rate and rhythm  Abdomen: Soft, non-tender, non-distended  Integument:  No rashes or erythema present. Neurological:  Motor functions intact.     Lab / Imaging Results   (All laboratory and radiology results have been personally reviewed by myself)  Labs:  Results for orders placed or performed during the hospital encounter of 06/06/20   CBC Auto Differential   Result Value Ref Range    WBC 6.3 4.5 - 11.5 E9/L    RBC 4.26 3.80 - 5.80 E12/L    Hemoglobin 13.4 12.5 - 16.5 g/dL    Hematocrit 40.1 37.0 - 54.0 %    MCV 94.1 80.0 - 99.9 fL    MCH 31.5 26.0 - 35.0 pg    MCHC 33.4 32.0 - 34.5 %    RDW 13.8 11.5 - 15.0 fL    Platelets 499 955 - 067 E9/L    MPV 9.4 7.0 - 12.0 fL    Neutrophils % 68.6 43.0 - 80.0 %    Immature Granulocytes % 0.5 0.0 - 5.0 %    Lymphocytes % 19.7 (L) 20.0 - 42.0 %    Monocytes % 9.1 2.0 - 12.0 %    Eosinophils % 1.8 0.0 - 6.0 %    Basophils % 0.3 0.0 - 2.0 %    Neutrophils Absolute 4.31 1.80 - 7.30 E9/L    Immature Granulocytes # 0.03 E9/L    Lymphocytes Absolute 1.24 (L) 1.50 - 4.00 E9/L Monocytes Absolute 0.57 0.10 - 0.95 E9/L    Eosinophils Absolute 0.11 0.05 - 0.50 E9/L    Basophils Absolute 0.02 0.00 - 0.20 E9/L   Comprehensive Metabolic Panel   Result Value Ref Range    Sodium 139 132 - 146 mmol/L    Potassium 4.2 3.5 - 5.0 mmol/L    Chloride 104 98 - 107 mmol/L    CO2 26 22 - 29 mmol/L    Anion Gap 9 7 - 16 mmol/L    Glucose 84 74 - 99 mg/dL    BUN 16 8 - 23 mg/dL    CREATININE 1.5 (H) 0.7 - 1.2 mg/dL    GFR Non-African American 46 >=60 mL/min/1.73    GFR African American 56     Calcium 9.4 8.6 - 10.2 mg/dL    Total Protein 7.0 6.4 - 8.3 g/dL    Alb 4.4 3.5 - 5.2 g/dL    Total Bilirubin 0.5 0.0 - 1.2 mg/dL    Alkaline Phosphatase 95 40 - 129 U/L    ALT 10 0 - 40 U/L    AST 17 0 - 39 U/L   Troponin   Result Value Ref Range    Troponin <0.01 0.00 - 0.03 ng/mL   Brain Natriuretic Peptide   Result Value Ref Range    Pro-BNP 52 0 - 125 pg/mL   COVID-19   Result Value Ref Range    SARS-CoV-2, NAAT Not Detected Not Detected   EKG 12 Lead   Result Value Ref Range    Ventricular Rate 80 BPM    Atrial Rate 80 BPM    P-R Interval 156 ms    QRS Duration 70 ms    Q-T Interval 382 ms    QTc Calculation (Bazett) 440 ms    P Axis 63 degrees    R Axis 31 degrees    T Axis 58 degrees     Imaging: All Radiology results interpreted by Radiologist unless otherwise noted. XR CHEST STANDARD (2 VW)   Final Result      No airspace opacities or pleural effusion. EKG #1:  Interpreted by emergency department physician unless otherwise noted. Time:  1544    Rate: 80  QT/QTc: 382/440  Rhythm: Sinus. Interpretation: no acute changes. Comparison: stable as compared to patient's most recent EKG. ED Course / Medical Decision Making   ED Medications:   Medications - No data to display    Consults:  None    Procedures:  none     Medical Decision Making:   Patient is well appearing, non toxic and appropriate for outpatient management. Plan is for symptom management and PCP follow up.    Pulse ox with

## 2020-06-06 NOTE — TELEPHONE ENCOUNTER
Received call from [facility]. Reason for Disposition   [1] MODERATE difficulty breathing (e.g., speaks in phrases, SOB even at rest, pulse 100-120) AND [2] NEW-onset or WORSE than normal    Answer Assessment - Initial Assessment Questions  1. RESPIRATORY STATUS: \"Describe your breathing? \" (e.g., wheezing, shortness of breath, unable to speak, severe coughing)       SOB at both rest and with activity, dry cough    2. ONSET: \"When did this breathing problem begin? \"       Yesterday    3. PATTERN \"Does the difficult breathing come and go, or has it been constant since it started? \"       Constant    4. SEVERITY: \"How bad is your breathing? \" (e.g., mild, moderate, severe)     - MILD: No SOB at rest, mild SOB with walking, speaks normally in sentences, can lay down, no retractions, pulse < 100.     - MODERATE: SOB at rest, SOB with minimal exertion and prefers to sit, cannot lie down flat, speaks in phrases, mild retractions, audible wheezing, pulse 100-120.     - SEVERE: Very SOB at rest, speaks in single words, struggling to breathe, sitting hunched forward, retractions, pulse > 120       Moderate    5. RECURRENT SYMPTOM: \"Have you had difficulty breathing before? \" If so, ask: \"When was the last time? \" and \"What happened that time? \"       Denies    6. CARDIAC HISTORY: \"Do you have any history of heart disease? \" (e.g., heart attack, angina, bypass surgery, angioplasty)       Denies    7. LUNG HISTORY: \"Do you have any history of lung disease? \"  (e.g., pulmonary embolus, asthma, emphysema)      Denies    8. CAUSE: \"What do you think is causing the breathing problem? \"       Denies    9. OTHER SYMPTOMS: \"Do you have any other symptoms? (e.g., dizziness, runny nose, cough, chest pain, fever)      Dry cough, weakness, loss of appetite     10. PREGNANCY: \"Is there any chance you are pregnant? \" \"When was your last menstrual period? \"        N/a     11.  TRAVEL: \"Have you traveled out of the country in the

## 2020-06-07 LAB
EKG ATRIAL RATE: 80 BPM
EKG P AXIS: 63 DEGREES
EKG P-R INTERVAL: 156 MS
EKG Q-T INTERVAL: 382 MS
EKG QRS DURATION: 70 MS
EKG QTC CALCULATION (BAZETT): 440 MS
EKG R AXIS: 31 DEGREES
EKG T AXIS: 58 DEGREES
EKG VENTRICULAR RATE: 80 BPM

## 2020-06-07 PROCEDURE — 93010 ELECTROCARDIOGRAM REPORT: CPT | Performed by: INTERNAL MEDICINE

## 2020-06-08 ENCOUNTER — CARE COORDINATION (OUTPATIENT)
Dept: CARE COORDINATION | Age: 70
End: 2020-06-08

## 2020-06-09 ENCOUNTER — CARE COORDINATION (OUTPATIENT)
Dept: CARE COORDINATION | Age: 70
End: 2020-06-09

## 2020-06-30 ENCOUNTER — VIRTUAL VISIT (OUTPATIENT)
Dept: ENDOCRINOLOGY | Age: 70
End: 2020-06-30
Payer: OTHER GOVERNMENT

## 2020-06-30 PROBLEM — E55.9 VITAMIN D DEFICIENCY: Status: ACTIVE | Noted: 2020-06-30

## 2020-06-30 PROBLEM — E03.9 HYPOTHYROIDISM: Status: ACTIVE | Noted: 2020-06-30

## 2020-06-30 PROBLEM — R53.82 CHRONIC FATIGUE: Status: ACTIVE | Noted: 2020-06-30

## 2020-06-30 PROCEDURE — 99443 PR PHYS/QHP TELEPHONE EVALUATION 21-30 MIN: CPT | Performed by: INTERNAL MEDICINE

## 2020-06-30 NOTE — PROGRESS NOTES
Virtual visit Phone call     Consent:  Patient is aware that may receive a bill for this telephone service, depending on the insurance coverage, and has provided verbal consent to proceed. I affirm this is a Patient Initiated Episode with an Established Patient who has not had a related appointment within my department in the past 7 days or scheduled within the next 24 hours. Date of Service: 6/30/2020  Primary Care Physician: Bhavana Pope MD  Provider: Lisa Rao MD        Reason for the visit:  Primary Hypothyroidism    History of Present Illness: The history is provided by the patient. No  was used. Accuracy of the patient data is excellent. Saman Mckeon is a very pleasant 79 y.o. male with past medical history of depression seen today for follow up visit     The patient was diagnosed with hypothyroidism \in 9/2019 and since then has been on thyroid hormones replacement. Currently on levothyroxine 50 mcg daily. Patient takes levothyroxine in the morning at empty stomach, wait one hour before eating , avoid multivitamins containing calcium  or iron with it.   Lab Results   Component Value Date/Time    TSH 2.790 03/28/2020 02:25 PM     Patient reported feeling better on this levothyroxine dose     AM cortisol 12/2019 --> 9.6     PAST MEDICAL HISTORY   Past Medical History:   Diagnosis Date    Cancer [facility])     Degeneration of lumbar intervertebral disc 9/14/2011    Depression     HIGH CHOLESTEROL     Hypertriglyceridemia 9/14/2011    Major depressive disorder, recurrent episode, severe ([address] 9/14/2011    MDD (major depressive disorder), recurrent severe, without psychosis ([address]     Overactive bladder 9/14/2011    Prostate cancer ([address]     Suicidal ideations     Urinary incontinence      PAST SURGICAL HISTORY   Past Surgical History:   Procedure Laterality Date    APPENDECTOMY      BACK SURGERY      lumbar disc, and cervical vertabrae    COLONOSCOPY     

## 2020-08-06 RX ORDER — LEVOTHYROXINE SODIUM 0.05 MG/1
50 TABLET ORAL DAILY
Qty: 90 TABLET | Refills: 3 | Status: SHIPPED | OUTPATIENT
Start: 2020-08-06 | End: 2022-02-07 | Stop reason: SDUPTHER

## 2021-04-15 ENCOUNTER — OFFICE VISIT (OUTPATIENT)
Dept: NEUROLOGY | Age: 71
End: 2021-04-15
Payer: MEDICARE

## 2021-04-15 VITALS
DIASTOLIC BLOOD PRESSURE: 88 MMHG | WEIGHT: 185 LBS | HEIGHT: 74 IN | RESPIRATION RATE: 16 BRPM | BODY MASS INDEX: 23.74 KG/M2 | TEMPERATURE: 98.5 F | SYSTOLIC BLOOD PRESSURE: 130 MMHG | OXYGEN SATURATION: 97 % | HEART RATE: 62 BPM

## 2021-04-15 DIAGNOSIS — F03.90 DEMENTIA WITHOUT BEHAVIORAL DISTURBANCE, UNSPECIFIED DEMENTIA TYPE: Primary | ICD-10-CM

## 2021-04-15 PROCEDURE — G8420 CALC BMI NORM PARAMETERS: HCPCS | Performed by: PSYCHIATRY & NEUROLOGY

## 2021-04-15 PROCEDURE — 1036F TOBACCO NON-USER: CPT | Performed by: PSYCHIATRY & NEUROLOGY

## 2021-04-15 PROCEDURE — 1123F ACP DISCUSS/DSCN MKR DOCD: CPT | Performed by: PSYCHIATRY & NEUROLOGY

## 2021-04-15 PROCEDURE — 99215 OFFICE O/P EST HI 40 MIN: CPT | Performed by: PSYCHIATRY & NEUROLOGY

## 2021-04-15 PROCEDURE — 4040F PNEUMOC VAC/ADMIN/RCVD: CPT | Performed by: PSYCHIATRY & NEUROLOGY

## 2021-04-15 PROCEDURE — 3017F COLORECTAL CA SCREEN DOC REV: CPT | Performed by: PSYCHIATRY & NEUROLOGY

## 2021-04-15 PROCEDURE — G8427 DOCREV CUR MEDS BY ELIG CLIN: HCPCS | Performed by: PSYCHIATRY & NEUROLOGY

## 2021-04-15 RX ORDER — OMEGA-3S/DHA/EPA/FISH OIL/D3 300MG-1000
400 CAPSULE ORAL DAILY
COMMUNITY
End: 2021-07-11 | Stop reason: ALTCHOICE

## 2021-04-15 RX ORDER — DONEPEZIL HYDROCHLORIDE 10 MG/1
TABLET, FILM COATED ORAL
Qty: 74 TABLET | Refills: 0 | Status: SHIPPED | OUTPATIENT
Start: 2021-04-15 | Stop reason: SDUPTHER

## 2021-04-15 ASSESSMENT — ENCOUNTER SYMPTOMS
SHORTNESS OF BREATH: 0
VOMITING: 0
TROUBLE SWALLOWING: 0
NAUSEA: 0
PHOTOPHOBIA: 0
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
## 2021-04-16 ENCOUNTER — HOSPITAL ENCOUNTER (OUTPATIENT)
Age: 71
Discharge: HOME | End: 2021-04-16
Payer: MEDICARE

## 2021-04-16 DIAGNOSIS — F03.90 DEMENTIA WITHOUT BEHAVIORAL DISTURBANCE, UNSPECIFIED DEMENTIA TYPE: ICD-10-CM

## 2021-04-16 LAB
FOLATE: >20 NG/ML (ref 4.8–24.2)
VITAMIN B-12: 357 PG/ML (ref 211–946)

## 2021-04-16 PROCEDURE — 36415 COLL VENOUS BLD VENIPUNCTURE: CPT

## 2021-04-16 PROCEDURE — 82746 ASSAY OF FOLIC ACID SERUM: CPT

## 2021-04-16 PROCEDURE — 82607 VITAMIN B-12: CPT

## 2021-04-19 ENCOUNTER — TELEPHONE (OUTPATIENT)
Dept: NEUROLOGY | Age: 71
End: 2021-04-19

## 2021-04-19 DIAGNOSIS — F03.90 DEMENTIA WITHOUT BEHAVIORAL DISTURBANCE, UNSPECIFIED DEMENTIA TYPE: ICD-10-CM

## 2021-04-27 ENCOUNTER — HOSPITAL ENCOUNTER (OUTPATIENT)
Dept: MRI IMAGING | Age: 71
Discharge: HOME | End: 2021-04-29
Payer: MEDICARE

## 2021-04-27 DIAGNOSIS — F03.90 DEMENTIA WITHOUT BEHAVIORAL DISTURBANCE, UNSPECIFIED DEMENTIA TYPE: ICD-10-CM

## 2021-04-27 PROCEDURE — 70551 MRI BRAIN STEM W/O DYE: CPT

## 2021-04-28 ENCOUNTER — TELEPHONE (OUTPATIENT)
Dept: NEUROLOGY | Age: 71
End: 2021-04-28

## 2021-04-28 NOTE — TELEPHONE ENCOUNTER
Left message for patient and informed him that his brain MRI shows age-related white spots. Otherwise no change compared to the last MRI.

## 2021-05-24 DIAGNOSIS — F03.90 DEMENTIA WITHOUT BEHAVIORAL DISTURBANCE, UNSPECIFIED DEMENTIA TYPE: ICD-10-CM

## 2021-05-26 RX ORDER — DONEPEZIL HYDROCHLORIDE 10 MG/1
TABLET, FILM COATED ORAL
Qty: 74 TABLET | Refills: 0 | Status: SHIPPED
Start: 2021-05-26 | End: 2021-07-09 | Stop reason: SDUPTHER

## 2021-07-08 DIAGNOSIS — F03.90 DEMENTIA WITHOUT BEHAVIORAL DISTURBANCE, UNSPECIFIED DEMENTIA TYPE: ICD-10-CM

## 2021-07-09 RX ORDER — DONEPEZIL HYDROCHLORIDE 10 MG/1
TABLET, FILM COATED ORAL
Qty: 60 TABLET | Refills: 2 | Status: ON HOLD | OUTPATIENT
Start: 2021-07-09

## 2021-07-11 ENCOUNTER — HOSPITAL ENCOUNTER (EMERGENCY)
Age: 71
Discharge: ANOTHER HOSPITAL | End: 2021-07-12
Attending: EMERGENCY MEDICINE
Payer: MEDICARE

## 2021-07-11 ENCOUNTER — APPOINTMENT (OUTPATIENT)
Dept: CT IMAGING | Age: 71
End: 2021-07-11
Payer: MEDICARE

## 2021-07-11 DIAGNOSIS — T14.91XA SUICIDE ATTEMPT (HCC): Primary | ICD-10-CM

## 2021-07-11 DIAGNOSIS — F33.9 EPISODE OF RECURRENT MAJOR DEPRESSIVE DISORDER, UNSPECIFIED DEPRESSION EPISODE SEVERITY (HCC): ICD-10-CM

## 2021-07-11 LAB
ACETAMINOPHEN LEVEL: <5 MCG/ML (ref 10–30)
ALBUMIN SERPL-MCNC: 4.1 G/DL (ref 3.5–5.2)
ALP BLD-CCNC: 107 U/L (ref 40–129)
ALT SERPL-CCNC: 7 U/L (ref 0–40)
AMPHETAMINE SCREEN, URINE: NOT DETECTED
ANION GAP SERPL CALCULATED.3IONS-SCNC: 10 MMOL/L (ref 7–16)
AST SERPL-CCNC: 20 U/L (ref 0–39)
BARBITURATE SCREEN URINE: NOT DETECTED
BASOPHILS ABSOLUTE: 0.02 E9/L (ref 0–0.2)
BASOPHILS RELATIVE PERCENT: 0.2 % (ref 0–2)
BENZODIAZEPINE SCREEN, URINE: NOT DETECTED
BILIRUB SERPL-MCNC: <0.2 MG/DL (ref 0–1.2)
BUN BLDV-MCNC: 18 MG/DL (ref 6–23)
CALCIUM SERPL-MCNC: 8.7 MG/DL (ref 8.6–10.2)
CANNABINOID SCREEN URINE: NOT DETECTED
CHLORIDE BLD-SCNC: 109 MMOL/L (ref 98–107)
CO2: 25 MMOL/L (ref 22–29)
COCAINE METABOLITE SCREEN URINE: NOT DETECTED
CREAT SERPL-MCNC: 1.5 MG/DL (ref 0.7–1.2)
EKG ATRIAL RATE: 85 BPM
EKG ATRIAL RATE: 90 BPM
EKG P AXIS: 61 DEGREES
EKG P AXIS: 62 DEGREES
EKG P-R INTERVAL: 166 MS
EKG P-R INTERVAL: 188 MS
EKG Q-T INTERVAL: 368 MS
EKG Q-T INTERVAL: 424 MS
EKG QRS DURATION: 78 MS
EKG QRS DURATION: 82 MS
EKG QTC CALCULATION (BAZETT): 450 MS
EKG QTC CALCULATION (BAZETT): 504 MS
EKG R AXIS: 45 DEGREES
EKG R AXIS: 58 DEGREES
EKG T AXIS: 33 DEGREES
EKG T AXIS: 49 DEGREES
EKG VENTRICULAR RATE: 85 BPM
EKG VENTRICULAR RATE: 90 BPM
EOSINOPHILS ABSOLUTE: 0.1 E9/L (ref 0.05–0.5)
EOSINOPHILS RELATIVE PERCENT: 0.9 % (ref 0–6)
ETHANOL: <10 MG/DL (ref 0–0.08)
FENTANYL SCREEN, URINE: NOT DETECTED
GFR AFRICAN AMERICAN: 56
GFR NON-AFRICAN AMERICAN: 46 ML/MIN/1.73
GLUCOSE BLD-MCNC: 155 MG/DL (ref 74–99)
HCT VFR BLD CALC: 38.9 % (ref 37–54)
HEMOGLOBIN: 12.7 G/DL (ref 12.5–16.5)
IMMATURE GRANULOCYTES #: 0.05 E9/L
IMMATURE GRANULOCYTES %: 0.5 % (ref 0–5)
LACTIC ACID: 1.4 MMOL/L (ref 0.5–2.2)
LIPASE: 531 U/L (ref 13–60)
LYMPHOCYTES ABSOLUTE: 1.45 E9/L (ref 1.5–4)
LYMPHOCYTES RELATIVE PERCENT: 13.2 % (ref 20–42)
Lab: NORMAL
MCH RBC QN AUTO: 30.7 PG (ref 26–35)
MCHC RBC AUTO-ENTMCNC: 32.6 % (ref 32–34.5)
MCV RBC AUTO: 94 FL (ref 80–99.9)
METHADONE SCREEN, URINE: NOT DETECTED
MONOCYTES ABSOLUTE: 0.74 E9/L (ref 0.1–0.95)
MONOCYTES RELATIVE PERCENT: 6.7 % (ref 2–12)
NEUTROPHILS ABSOLUTE: 8.62 E9/L (ref 1.8–7.3)
NEUTROPHILS RELATIVE PERCENT: 78.5 % (ref 43–80)
OPIATE SCREEN URINE: NOT DETECTED
OXYCODONE URINE: NOT DETECTED
PDW BLD-RTO: 13.7 FL (ref 11.5–15)
PHENCYCLIDINE SCREEN URINE: NOT DETECTED
PLATELET # BLD: 181 E9/L (ref 130–450)
PMV BLD AUTO: 9.8 FL (ref 7–12)
POTASSIUM REFLEX MAGNESIUM: 3.6 MMOL/L (ref 3.5–5)
RBC # BLD: 4.14 E12/L (ref 3.8–5.8)
SALICYLATE, SERUM: <0.3 MG/DL (ref 0–30)
SARS-COV-2, NAAT: NOT DETECTED
SODIUM BLD-SCNC: 144 MMOL/L (ref 132–146)
TOTAL PROTEIN: 6.5 G/DL (ref 6.4–8.3)
TRICYCLIC ANTIDEPRESSANTS SCREEN SERUM: NEGATIVE NG/ML
WBC # BLD: 11 E9/L (ref 4.5–11.5)

## 2021-07-11 PROCEDURE — 85025 COMPLETE CBC W/AUTO DIFF WBC: CPT

## 2021-07-11 PROCEDURE — 80053 COMPREHEN METABOLIC PANEL: CPT

## 2021-07-11 PROCEDURE — 93005 ELECTROCARDIOGRAM TRACING: CPT | Performed by: EMERGENCY MEDICINE

## 2021-07-11 PROCEDURE — 80307 DRUG TEST PRSMV CHEM ANLYZR: CPT

## 2021-07-11 PROCEDURE — 80179 DRUG ASSAY SALICYLATE: CPT

## 2021-07-11 PROCEDURE — 96372 THER/PROPH/DIAG INJ SC/IM: CPT

## 2021-07-11 PROCEDURE — 87635 SARS-COV-2 COVID-19 AMP PRB: CPT

## 2021-07-11 PROCEDURE — 93010 ELECTROCARDIOGRAM REPORT: CPT | Performed by: INTERNAL MEDICINE

## 2021-07-11 PROCEDURE — 80143 DRUG ASSAY ACETAMINOPHEN: CPT

## 2021-07-11 PROCEDURE — 83605 ASSAY OF LACTIC ACID: CPT

## 2021-07-11 PROCEDURE — 83690 ASSAY OF LIPASE: CPT

## 2021-07-11 PROCEDURE — 2580000003 HC RX 258: Performed by: EMERGENCY MEDICINE

## 2021-07-11 PROCEDURE — 82077 ASSAY SPEC XCP UR&BREATH IA: CPT

## 2021-07-11 PROCEDURE — 99285 EMERGENCY DEPT VISIT HI MDM: CPT

## 2021-07-11 PROCEDURE — 6360000002 HC RX W HCPCS

## 2021-07-11 PROCEDURE — 74176 CT ABD & PELVIS W/O CONTRAST: CPT

## 2021-07-11 RX ORDER — CYANOCOBALAMIN 1000 UG/ML
1000 INJECTION INTRAMUSCULAR; SUBCUTANEOUS
Status: ON HOLD | COMMUNITY
Start: 2021-05-20

## 2021-07-11 RX ORDER — 0.9 % SODIUM CHLORIDE 0.9 %
1000 INTRAVENOUS SOLUTION INTRAVENOUS ONCE
Status: COMPLETED | OUTPATIENT
Start: 2021-07-11 | End: 2021-07-11

## 2021-07-11 RX ADMIN — TRIMETHOBENZAMIDE HYDROCHLORIDE 200 MG: 100 INJECTION INTRAMUSCULAR at 09:46

## 2021-07-11 RX ADMIN — SODIUM CHLORIDE 1000 ML: 9 INJECTION, SOLUTION INTRAVENOUS at 02:40

## 2021-07-11 ASSESSMENT — PATIENT HEALTH QUESTIONNAIRE - PHQ9: SUM OF ALL RESPONSES TO PHQ QUESTIONS 1-9: 18

## 2021-07-11 NOTE — ED PROVIDER NOTES
HPI:  7/11/21,   Time: 2:29 AM EDT         Vera Esqueda is a 70 y.o. male presenting to the ED for took 20 tablets of Aricept at 11 PM last night with an attempt to hurt himself states he has been depressed for a long time and having marital problems, beginning ingestion 3-1/2 hours ago. The complaint has been constant, moderate in severity, and worsened by nothing. Patient has had prior suicide attempt    ROS:   Pertinent positives and negatives are stated within HPI, all other systems reviewed and are negative.  --------------------------------------------- PAST HISTORY ---------------------------------------------  Past Medical History:  has a past medical history of Cancer ([facility] [address] Depression, Major depressive disorder, recurrent episode, severe ([facility] [address] MDD (major depressive disorder), recurrent severe, without psychosis ([facility] [address] Overactive bladder, Prostate cancer ([facility] [address] Suicidal ideations, and Urinary incontinence. Past Surgical History:  has a past surgical history that includes other surgical history (02/21/2014); back surgery; Prostatectomy; Appendectomy; Tonsillectomy; and Colonoscopy. Social History:  reports that he has never smoked. He has never used smokeless tobacco. He reports that he does not drink alcohol and does not use drugs. Family History: family history includes Cancer in his brother and sister; Depression in his brother and sister. The patients home medications have been reviewed.     Allergies: Ketamine    -------------------------------------------------- RESULTS -------------------------------------------------  All laboratory and radiology results have been personally reviewed by myself   LABS:  Results for orders placed or performed during the hospital encounter of 07/11/21   CBC Auto Differential   Result Value Ref Range    WBC 11.0 4.5 - 11.5 E9/L    RBC 4.14 3.80 - 5.80 E12/L    Hemoglobin 12.7 12.5 - 16.5 g/dL    Hematocrit 38.9 37.0 - 54.0 %    MCV 94.0 80.0 - 99.9 Urine NOT DETECTED Negative <100 ng/mL    FENTANYL SCREEN, URINE NOT DETECTED Negative <1 ng/mL    Drug Screen Comment: see below    Serum Drug Screen   Result Value Ref Range    Ethanol Lvl <10 mg/dL    Acetaminophen Level <5.0 (L) 10.0 - 62.7 mcg/mL    Salicylate, Serum <8.7 0.0 - 30.0 mg/dL   EKG 12 Lead   Result Value Ref Range    Ventricular Rate 85 BPM    Atrial Rate 85 BPM    P-R Interval 188 ms    QRS Duration 82 ms    Q-T Interval 424 ms    QTc Calculation (Bazett) 504 ms    P Axis 62 degrees    R Axis 45 degrees    T Axis 33 degrees       RADIOLOGY:  Interpreted by Radiologist.  CT ABDOMEN PELVIS WO CONTRAST Additional Contrast? None   Final Result   1. Minimal nonspecific patchy pneumonitis in lower right lung. 2. Fairly prominent amount of stool. Correlate constipation. 3. There are no CT findings to suggest acute pancreatitis.             ------------------------- NURSING NOTES AND VITALS REVIEWED ---------------------------   The nursing notes within the ED encounter and vital signs as below have been reviewed. /87   Pulse 84   Temp 98.2 °F (36.8 °C) (Oral)   Resp 16   Ht 6' 2\" (1.88 m)   Wt 185 lb (83.9 kg)   SpO2 96%   BMI 23.75 kg/m²   Oxygen Saturation Interpretation: Normal      ---------------------------------------------------PHYSICAL EXAM--------------------------------------      Constitutional/General: Alert and oriented x3, well appearing, non toxic in NAD  Head: NC/AT  Eyes: PERRL, EOMI  Mouth: Oropharynx clear, handling secretions, no trismus  Neck: Supple, full ROM, no meningeal signs  Pulmonary: Lungs clear to auscultation bilaterally, no wheezes, rales, or rhonchi. Not in respiratory distress  Cardiovascular:  Regular rate and rhythm, no murmurs, gallops, or rubs. 2+ distal pulses  Abdomen: Soft, non tender, non distended,   Extremities: Moves all extremities x 4.  Warm and well perfused  Skin: warm and dry without rash  Neurologic: GCS 15,  Psych: Flat affect      ------------------------------ ED COURSE/MEDICAL DECISION MAKING----------------------  Medications   0.9 % sodium chloride bolus (0 mLs Intravenous Stopped 7/11/21 0337)         Medical Decision Making:    Depression and suicidal attempt    Counseling: The emergency provider has spoken with the patient and discussed todays results, in addition to providing specific details for the plan of care and counseling regarding the diagnosis and prognosis. Questions are answered at this time and they are agreeable with the plan.      --------------------------------- IMPRESSION AND DISPOSITION ---------------------------------    IMPRESSION  1. [facility]    2. Episode of recurrent major depressive disorder, unspecified depression episode severity ([facility] [address] Inadequately Controlled       DISPOSITION  Disposition:  To be determined by social service  Patient condition is stable      Patient to be medically cleared as of 7 AM after 8 hours of observation status post Aricept ingestion            Sumeet Rivera MD  07/11/21 0532

## 2021-07-11 NOTE — ED NOTES
Emergency Department [facility] AN AFFILIATE OF [facility] Biopsychosocial Assessment Note    Chief Complaint:     Patient is a 70year old, male presenting to ED for suicide attempt by overdose last night. Patient reports that he has been feeling suicidal for years and reports that he has been depressed for a very long time ago. Patient reports his main stressors have been his marriage and finances. Patient reports that this is the 4th time that he has attempted suicide. Patient admits to suicidal ideation. Patient denies homicidal ideation. MSE: Patient is alert & oriented x 4. Patient mood is depressed, flat affect. Patient thought process/speech pattern. Patient denies auditory/visual hallucinations. Clinical Summary/History:     Patient reports a mental health hx of major depression, patient reports that he receives medication management and counseling through the [facility], and patient last psychiatric admission was in April 2020 at [facility]. Patient reports that he has been oversleeping, appetite is good, & increased feelings of hopelessness/helplessness. Patient admits to hx of suicide attempts. Patient denies hx of self injurious behaviors. Patient denies drug/alcohol use. Gender  [x] Male [] Female [] Transgender  [] Other    Sexual Orientation    [x] Heterosexual [] Homosexual [] Bisexual [] Other    Suicidal Behavioral: CSSR-S Complete. [x] Reports:    [x] Past [x] Present   [] Denies    Homicidal/ Violent Behavior  [] Reports:   [] Past [] Present   [x] Denies     Hallucinations/Delusions   [] Reports:   [x] Denies     Substance Use/Alcohol Use/Addiction: SBIRT Screen Complete. [] Reports:   [x] Denies     Trauma History  [] Reports:  [x] Denies     Collateral Information:       Level of Care/Disposition Plan  [] Home:   [] Outpatient Provider:   [] Crisis Unit:   [x] Inpatient Psychiatric Unit:  [] Other:     Patient was pink slipped by ED Doctor.  SW will pursue inpatient admission for safety/stabilization.        YOU Claros, Corey Gomez  07/11/21 0719

## 2021-07-11 NOTE — ED NOTES
No beds currently on [facility]. Patient is requesting referral to [facility]. SW to complete referral, patient is aware that there may not be a bed available until tomorrow.  Patient declined referral to alternative facilities and reports that he would like to wait to transfer to the [address] [place] [address]  07/11/21 0778
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
## 2021-07-11 NOTE — ED NOTES
Call taken from [place] [phone] for status of patient.   Update given,       Shabana Minaya, RN  07/11/21 8707

## 2021-07-11 NOTE — ED NOTES
CO notified nurse that patient is having emesis after eating breakfast, Dr. Magy Matamoros notified and order placed for Tigan.        Yeison Burnett RN  07/11/21 4447

## 2021-07-11 NOTE — LETTER
Employer: RETIRED         Christianity: None   Primary Care Provider: Jenniffer Fleming MD         Primary Phone: [phone]   EMERGENCY CONTACT   Contact Name Legal Guardian? Relationship to Patient Home Phone Work Phone   1. Maureen Romero  2. *No Contact Specified* No    Spouse    [phone]               GUARANTOR            Guarantor: Emra Limon     : 1950   Address: [address] [place] [address] Sex: Male   [place] [address]     Relation to Patient: Self       Home Phone: [phone]   Guarantor ID: [id number]       Work Phone:     Guarantor Employer: RETIRED         Status: RETIRED   Henok Gomez        PRIMARY INSURANCE   Payor: MEDICARE Plan: MEDICARE PART A AND B   Payor Address:  [address] [address],  [address] [address]       Group Number:   Insurance Type: INDEMNITY   Subscriber Name: Sharon Dewey : 1950   Subscriber ID: [id number] [address]. Rel. to Sub: Self   SECONDARY INSURANCE   Payor: [employer] Plan: Maria Del Carmen Desir 61*   Payor Address:  [facility] [id number], [place], [place] [address]          Group Number:   Insurance Type: INDEMNITY   Subscriber Name: Sharon Dewey : 1950   Subscriber ID: [id number] Pat.  Rel. to Sub: SELF

## 2021-07-11 NOTE — ED NOTES
Spoke with [facility], patient is waiting for bed availability at the [place] per patient request.       Shabana Minaya RN  07/11/21 2288

## 2021-07-11 NOTE — ED NOTES
Pt states he took 25 Aricept around 2300 on 7/10/2021. Spoke with Vicenta Dillon RN at poison control. States \"The drug peaks at hour 3 after ingestion. Most common things to watch out for are cholinergic responses. Nausea, emesis, diarrhea, diaphoresis, hypersalivation, lacrimation, bradycardia, hypotension, seizures, and bronchospasms. Testing should be EKG, IV fluids, and labs: ASA, TYL, ETOH. Pt needs to be watched for 8 hours from ingestion and if he is asymptomatic may be discharged to an appropriate level of care. \"     Joie Jarrett RN  07/11/21 5270

## 2021-07-11 NOTE — ED NOTES
Bed: 16  Expected date:   Expected time:   Means of arrival:   Comments:  [id number] WHITNEY Broussard RN  07/11/21 0431

## 2021-07-11 NOTE — ED NOTES
LINDA aware of patient need for telehealth assessment. SW to complete within a half an hour.          YOU Zamora, RAJIW  07/11/21 5225

## 2021-07-12 VITALS
TEMPERATURE: 98 F | SYSTOLIC BLOOD PRESSURE: 125 MMHG | DIASTOLIC BLOOD PRESSURE: 71 MMHG | HEART RATE: 81 BPM | RESPIRATION RATE: 16 BRPM | BODY MASS INDEX: 23.74 KG/M2 | WEIGHT: 185 LBS | OXYGEN SATURATION: 97 % | HEIGHT: 74 IN

## 2021-07-12 PROCEDURE — 6370000000 HC RX 637 (ALT 250 FOR IP): Performed by: EMERGENCY MEDICINE

## 2021-07-12 RX ORDER — LEVOTHYROXINE SODIUM 0.05 MG/1
50 TABLET ORAL DAILY
Status: DISCONTINUED | OUTPATIENT
Start: 2021-07-12 | End: 2021-07-12 | Stop reason: HOSPADM

## 2021-07-12 RX ADMIN — LEVOTHYROXINE SODIUM 50 MCG: 0.05 TABLET ORAL at 11:14

## 2021-07-12 NOTE — ED NOTES
Assumed care of patient. Pt lying in bed in no apparent distress. CO at bedside.      Mu Corrales RN  07/11/21 5374

## 2021-07-12 NOTE — ED NOTES
Patient was provided breakfast tray and I did call the [place] for intake     Paco Vicotria RN  07/12/21 1197

## 2021-07-12 NOTE — ED NOTES
I have sent second fax to Jerry Singh at [place] and her contact number is [phone], EXT [address] Rebekah Santana RN  07/12/21 5592

## 2021-07-12 NOTE — ED NOTES
Pt in bed watching tv. No complaints at the moment. Pt safety tray ordered.      Abimael Kimball, RN  07/12/21 7736

## 2021-07-12 NOTE — ED NOTES
Unknown Kehr from [place] sis update and will take patient in ER at [facility]  07/12/21 4013

## 2021-07-12 NOTE — ED NOTES
This RN has introduced myself to pt. Pt safety tray given to pt. Pt resting comfortably in bed. Pt denies any current suicidal ideation. Pt updated on pt plan and current room situation. Will continue to monitor pt.       Natasha Bradford RN  07/12/21 4890

## 2021-07-12 NOTE — PROGRESS NOTES
[facility] at [phone] ext [phone] for patient transfer per patient request. the [place] clinic does not open till 8am so I  left a detailed message about the patient and gave them our phone number.

## 2021-07-12 NOTE — ED NOTES
Patient awake and alert at this time. Vitals taken. Patient has no complaints. C/O at bedside. Will monitor.      Sheng Sanches RN  07/12/21 1041

## 2021-07-12 NOTE — ED NOTES
Patient sleeping at this time, respirations even and non labored. C/O remains at bedside. Will monitor.      Marya Crane RN  07/12/21 0433

## 2021-07-12 NOTE — CARE COORDINATION
Social Work/Transition of Care:    Pt in need of inpatient mental health admission pt was accepted to the [facility] but requested to go to the [place] in [place], pt transfer to the main campus stopped and pt was referred to the [place], [place] continues to have no beds, pt agreeable to the [facility], SW notified Jess Strange, LINDA JETT.    Electronically signed by RONNI Parks on 4/82/5635 at 11:22 AM

## 2021-07-12 NOTE — CARE COORDINATION
Social Work / Discharge Planning:    Social work informed by Charge Nurse that he has been accepted to the [place] in [facility] [employer]. Dion Delgado with the [employer] at [phone], [place] [address] and was informed accepting physician is Parth Bermudez. Contact number for nurse to nurse hand off is [phone], EXT [id number]. Patient has no transportation benefit. Social work contacted [facility] and arranged transportation, ETA 2 to 3 hours. Ambulance form completed and placed in chart. Social work contacted [facility] AN AFFILIATE OF [facility] and spoke with Honey Younger and provided update. Patient no longer in need of placement due to he is accepted by [place]. RN and patient updated.  Electronically signed by RONNI Merino on 7/12/21 at 2:37 PM EDT

## 2021-07-12 NOTE — ED NOTES
Patient in room, resting with eyes closed. C/O at bedside. Will monitor.      Gamaliel Reaves RN  07/12/21 3272

## 2021-07-12 NOTE — ED NOTES
[facility] notified that patient has reconsidered accepting bed at [address] [place] [address]. Unfortunately that bed is no longer available. Will monitor.      Tsering Smith RN  07/11/21 3901

## 2021-07-12 NOTE — CARE COORDINATION
Social Work/Transition of Care:    Call to [facility] for ETA update per dispatch the squad they were sending was rerouted and it will be another two hours until pt can be transported ED Charge Nurse updated.     Electronically signed by Tatiana Desai on 0/89/6625 at 5:24 PM

## 2021-07-12 NOTE — ED NOTES
Pt resting quietly in bed. No concerns at this time. Vitals taken. CO at bedside.      Meghann Rodriguez RN  07/12/21 261 Zuhair Landrum RN  07/12/21 1273

## 2021-07-12 NOTE — ED NOTES
Pt resting in bed calm and cooperative. No complaints at this time. CO at bedside. Ordered pt safety tray.      Lianna Fair RN  07/12/21 0087

## 2021-07-12 NOTE — ED NOTES
Pt resting comfortably in bed. Pt calm and cooperative. Needs met at the moment. CO at bedside.       Akila Monroe RN  07/12/21 1712

## 2021-07-12 NOTE — ED NOTES
Pt resting in bed with eyes shut. CO at bedside. Vitals taken. Will continue to monitor.       Carina Pena RN  07/12/21 7702

## 2021-08-06 ENCOUNTER — VIRTUAL VISIT (OUTPATIENT)
Dept: ENDOCRINOLOGY | Age: 71
End: 2021-08-06
Payer: MEDICARE

## 2021-08-06 DIAGNOSIS — E03.9 HYPOTHYROIDISM, UNSPECIFIED TYPE: ICD-10-CM

## 2021-08-06 DIAGNOSIS — E55.9 VITAMIN D DEFICIENCY: Primary | ICD-10-CM

## 2021-08-06 PROCEDURE — 1123F ACP DISCUSS/DSCN MKR DOCD: CPT | Performed by: INTERNAL MEDICINE

## 2021-08-06 PROCEDURE — G8427 DOCREV CUR MEDS BY ELIG CLIN: HCPCS | Performed by: INTERNAL MEDICINE

## 2021-08-06 PROCEDURE — 99213 OFFICE O/P EST LOW 20 MIN: CPT | Performed by: INTERNAL MEDICINE

## 2021-08-06 PROCEDURE — 4040F PNEUMOC VAC/ADMIN/RCVD: CPT | Performed by: INTERNAL MEDICINE

## 2021-08-06 PROCEDURE — G8420 CALC BMI NORM PARAMETERS: HCPCS | Performed by: INTERNAL MEDICINE

## 2021-08-06 PROCEDURE — 1036F TOBACCO NON-USER: CPT | Performed by: INTERNAL MEDICINE

## 2021-08-06 PROCEDURE — 3017F COLORECTAL CA SCREEN DOC REV: CPT | Performed by: INTERNAL MEDICINE

## 2021-08-06 NOTE — PROGRESS NOTES
Arianna Baldwin was read the following message We want to confirm that, for purposes of billing, this is a virtual visit with your provider for which we will submit a claim for reimbursement with your insurance company. You will be responsible for any copays, coinsurance amounts or other amounts not covered by your insurance company. If you do not accept this, unfortunately we will not be able to schedule or proceed with a virtual visit with the provider. Do you accept? Aminta Hdz responded Yes .

## 2021-08-06 NOTE — PROGRESS NOTES
[address] [place] [address] Department of Endocrinology Diabetes and Metabolism   [address] [facility] [address]   Phone: [phone]  Fax: [phone]      Date of Service: 8/6/2021  Primary Care Physician: Ricardo Moran MD  Provider: Ashley Hill MD        Reason for the visit:  Primary Hypothyroidism    History of Present Illness: The history is provided by the patient. No  was used. Accuracy of the patient data is excellent. Nancy Victoria is a very pleasant 70 y.o. male with past medical history of depression seen today for follow up visit   The patient was diagnosed with hypothyroidism \in 9/2019 and since then has been on thyroid hormones replacement. Currently on levothyroxine 50 mcg daily. Patient takes levothyroxine in the morning at empty stomach, wait one hour before eating , avoid multivitamins containing calcium  or iron with it. Lab Results   Component Value Date/Time    TSH 2.790 03/28/2020 02:25 PM    T4FREE 1.23 01/10/2017 05:23 PM    M0YJRHE 8.0 12/30/2019 08:53 AM    FT3 2.7 06/18/2014 09:00 PM     Patient reported feeling better on this levothyroxine dose     AM cortisol 12/2019 --> 9.6     PAST MEDICAL HISTORY   Past Medical History:   Diagnosis Date    Cancer ([address]     Depression     Major depressive disorder, recurrent episode, severe ([address] 9/14/2011    MDD (major depressive disorder), recurrent severe, without psychosis ([address]     Overactive bladder 9/14/2011    Prostate cancer ([address]     Suicidal ideations     Urinary incontinence      PAST SURGICAL HISTORY   Past Surgical History:   Procedure Laterality Date    APPENDECTOMY      BACK SURGERY      lumbar disc, and cervical vertabrae    COLONOSCOPY      OTHER SURGICAL HISTORY  02/21/2014    Myelogram    PROSTATECTOMY      TONSILLECTOMY       SOCIAL HISTORY   Tobacco:   reports that he has never smoked.  He has never used smokeless tobacco.  Alcol:   reports no history of alcohol use. Illicit Drugs:   reports no history of drug use. FAMILY HISTORY   Family History   Problem Relation Age of Onset    Cancer Sister     Depression Sister     Cancer Brother     Depression Brother      ALLERGIES AND DRUG REACTIONS   Allergies   Allergen Reactions    Ketamine Other (See Comments)     Seizure       CURRENT MEDICATIONS   Current Outpatient Medications   Medication Sig Dispense Refill    cyanocobalamin 1000 MCG/ML injection Inject 1,000 mcg into the muscle every 30 days      donepezil (ARICEPT) 10 MG tablet TAKE TWO TABLETS NIGHTLY 60 tablet 2    levothyroxine (SYNTHROID) 50 MCG tablet Take 1 tablet by mouth daily 90 tablet 3    sildenafil (VIAGRA) 100 MG tablet Take 50 mg by mouth daily as needed for Erectile Dysfunction Take 1/2 tab of  (100 mg tab) by mouth = 50 mg 1 hour before sex      FOLIC ACID PO Take 1 mg by mouth daily       venlafaxine (EFFEXOR XR) 75 MG extended release capsule Take 1 capsule by mouth daily 30 capsule 0    ARIPiprazole (ABILIFY) 2 MG tablet Take 1 tablet by mouth daily 30 tablet 0     No current facility-administered medications for this visit. Review of Systems  Constitutional: No fever, no chills, no diaphoresis, no generalized weakness. HEENT: No blurred vision, No sore throat, no ear pain, no hair loss  Neck: denied any neck swelling, difficulty swallowing,   Cadrdiopulomary: No CP, SOB or palpitation, No orthopnea or PND. No cough or wheezing. GI: No N/V/D, no constipation, No abdominal pain, no melena or hematochezia   : Denied any dysuria, hematuria, flank pain, discharge, or incontinence. Skin: denied any rash, ulcer, Hirsute, or hyperpigmentation. MSK: denied any joint deformity, joint pain/swelling, muscle pain, or back pain. Neuro: no numbess, no tingling, no weakness,     OBJECTIVE    There were no vitals taken for this visit.   BP Readings from Last 4 Encounters:   07/12/21 125/71   04/15/21 130/88   06/06/20 122/82 04/02/20 128/80     Wt Readings from Last 6 Encounters:   07/11/21 185 lb (83.9 kg)   04/15/21 185 lb (83.9 kg)   06/06/20 190 lb (86.2 kg)   04/02/20 185 lb (83.9 kg)   03/28/20 194 lb (88 kg)   12/30/19 194 lb 3.2 oz (88.1 kg)     Physical examination:  Due to this being a TeleHealth encounter, evaluation of the following organ systems is limited: Vitals/Constitutional/EENT/Resp/CV/GI//MS/Neuro/Skin/Heme-Lymph-Imm. Modified physical exam through Telemedicine camera    General: Communicating well via camera   Neck: no obvious neck mass. No obvious neck deformity     CVS: no distress   Chest: no distress. Chest is moving with respiration    Extremities:  no visible tremor  Skin: No visible rashes as seen from camera   Musculoskeletal: no visible deformity  Neuro: Alert and oriented to person, place, and time. Psychiatric: Normal mood and affect.  Behavior is normal    Review of Laboratory Data:  I have reviewed the following:  Lab Results   Component Value Date/Time    WBC 11.0 07/11/2021 02:34 AM    RBC 4.14 07/11/2021 02:34 AM    HGB 12.7 07/11/2021 02:34 AM    HCT 38.9 07/11/2021 02:34 AM    MCV 94.0 07/11/2021 02:34 AM    MCH 30.7 07/11/2021 02:34 AM    MCHC 32.6 07/11/2021 02:34 AM    RDW 13.7 07/11/2021 02:34 AM     07/11/2021 02:34 AM    MPV 9.8 07/11/2021 02:34 AM      Lab Results   Component Value Date/Time     07/11/2021 02:34 AM    K 3.6 07/11/2021 02:34 AM    CO2 25 07/11/2021 02:34 AM    BUN 18 07/11/2021 02:34 AM    CREATININE 1.5 (H) 07/11/2021 02:34 AM    CALCIUM 8.7 07/11/2021 02:34 AM    LABGLOM 46 07/11/2021 02:34 AM    GFRAA 56 07/11/2021 02:34 AM      Lab Results   Component Value Date/Time    TSH 2.790 03/28/2020 02:25 PM    T4FREE 1.23 01/10/2017 05:23 PM    Z5BVCZT 8.0 12/30/2019 08:53 AM    FT3 2.7 06/18/2014 09:00 PM     Lab Results   Component Value Date    GLUCOSE 155 07/11/2021    GLUCOSE 105 09/14/2011     Lab Results   Component Value Date    CHOL 227 09/11/2017

## 2021-09-27 ENCOUNTER — HOSPITAL ENCOUNTER (OUTPATIENT)
Dept: CT IMAGING | Age: 71
Discharge: HOME | End: 2021-09-29
Payer: OTHER GOVERNMENT

## 2021-09-27 ENCOUNTER — HOSPITAL ENCOUNTER (OUTPATIENT)
Age: 71
Discharge: HOME | End: 2021-09-27
Payer: OTHER GOVERNMENT

## 2021-09-27 DIAGNOSIS — R63.4 LOSS OF WEIGHT: ICD-10-CM

## 2021-09-27 DIAGNOSIS — E55.9 VITAMIN D DEFICIENCY: ICD-10-CM

## 2021-09-27 LAB
ANION GAP SERPL CALCULATED.3IONS-SCNC: 9 MMOL/L (ref 7–16)
BUN BLDV-MCNC: 16 MG/DL (ref 6–23)
CALCIUM SERPL-MCNC: 9.2 MG/DL (ref 8.6–10.2)
CHLORIDE BLD-SCNC: 107 MMOL/L (ref 98–107)
CO2: 26 MMOL/L (ref 22–29)
CREAT SERPL-MCNC: 1.3 MG/DL (ref 0.7–1.2)
GFR AFRICAN AMERICAN: >60
GFR NON-AFRICAN AMERICAN: 54 ML/MIN/1.73
GLUCOSE BLD-MCNC: 87 MG/DL (ref 74–99)
POTASSIUM SERPL-SCNC: 4.4 MMOL/L (ref 3.5–5)
SODIUM BLD-SCNC: 142 MMOL/L (ref 132–146)

## 2021-09-27 PROCEDURE — 36415 COLL VENOUS BLD VENIPUNCTURE: CPT

## 2021-09-27 PROCEDURE — 6360000004 HC RX CONTRAST MEDICATION: Performed by: RADIOLOGY

## 2021-09-27 PROCEDURE — 80048 BASIC METABOLIC PNL TOTAL CA: CPT

## 2021-09-27 PROCEDURE — 71260 CT THORAX DX C+: CPT

## 2021-09-27 PROCEDURE — 74177 CT ABD & PELVIS W/CONTRAST: CPT

## 2021-09-27 RX ADMIN — IOHEXOL 50 ML: 240 INJECTION, SOLUTION INTRATHECAL; INTRAVASCULAR; INTRAVENOUS; ORAL at 14:42

## 2021-09-27 RX ADMIN — IOPAMIDOL 75 ML: 755 INJECTION, SOLUTION INTRAVENOUS at 14:42

## 2022-02-07 ENCOUNTER — VIRTUAL VISIT (OUTPATIENT)
Dept: ENDOCRINOLOGY | Age: 72
End: 2022-02-07
Payer: MEDICARE

## 2022-02-07 DIAGNOSIS — E03.9 HYPOTHYROIDISM, UNSPECIFIED TYPE: ICD-10-CM

## 2022-02-07 DIAGNOSIS — E55.9 VITAMIN D DEFICIENCY: Primary | ICD-10-CM

## 2022-02-07 PROCEDURE — G8484 FLU IMMUNIZE NO ADMIN: HCPCS | Performed by: INTERNAL MEDICINE

## 2022-02-07 PROCEDURE — 99214 OFFICE O/P EST MOD 30 MIN: CPT | Performed by: INTERNAL MEDICINE

## 2022-02-07 PROCEDURE — 1036F TOBACCO NON-USER: CPT | Performed by: INTERNAL MEDICINE

## 2022-02-07 PROCEDURE — 1123F ACP DISCUSS/DSCN MKR DOCD: CPT | Performed by: INTERNAL MEDICINE

## 2022-02-07 PROCEDURE — G8420 CALC BMI NORM PARAMETERS: HCPCS | Performed by: INTERNAL MEDICINE

## 2022-02-07 PROCEDURE — 3017F COLORECTAL CA SCREEN DOC REV: CPT | Performed by: INTERNAL MEDICINE

## 2022-02-07 PROCEDURE — G8427 DOCREV CUR MEDS BY ELIG CLIN: HCPCS | Performed by: INTERNAL MEDICINE

## 2022-02-07 PROCEDURE — 4040F PNEUMOC VAC/ADMIN/RCVD: CPT | Performed by: INTERNAL MEDICINE

## 2022-02-07 RX ORDER — LEVOTHYROXINE SODIUM 0.05 MG/1
50 TABLET ORAL DAILY
Qty: 90 TABLET | Refills: 3 | Status: ON HOLD | OUTPATIENT
Start: 2022-02-07

## 2022-02-07 NOTE — PROGRESS NOTES
Therese Bojorquez was read the following message We want to confirm that, for purposes of billing, this is a virtual visit with your provider for which we will submit a claim for reimbursement with your insurance company. You will be responsible for any copays, coinsurance amounts or other amounts not covered by your insurance company. If you do not accept this, unfortunately we will not be able to schedule or proceed with a virtual visit with the provider. Do you accept? Lyndon Alfaro responded Yes .

## 2022-02-07 NOTE — PROGRESS NOTES
[address] [place] [address] Department of Endocrinology Diabetes and Metabolism   [address] [facility] [address]   Phone: [phone]  Fax: [phone]      Date of Service: 2/7/2022  Primary Care Physician: Makeda Olsen MD  Provider: Yenny Espinoza MD        Reason for the visit:  Primary Hypothyroidism    History of Present Illness: The history is provided by the patient. No  was used. Accuracy of the patient data is excellent. Geno Kumari is a very pleasant 70 y.o. male with past medical history of depression seen today for follow up visit   The patient was diagnosed with hypothyroidism \in 9/2019 and since then has been on thyroid hormones replacement. Currently on levothyroxine 50 mcg daily. Patient takes levothyroxine in the morning at empty stomach, wait one hour before eating , avoid multivitamins containing calcium  or iron with it. Due for labs now     Lab Results   Component Value Date/Time    TSH 2.790 03/28/2020 02:25 PM    T4FREE 1.23 01/10/2017 05:23 PM    W4TOPUL 8.0 12/30/2019 08:53 AM    FT3 2.7 06/18/2014 09:00 PM     Patient reported feeling better on this levothyroxine dose     AM cortisol 12/2019 --> 9.6     PAST MEDICAL HISTORY   Past Medical History:   Diagnosis Date    Cancer ([address]     Depression     Major depressive disorder, recurrent episode, severe ([address] 9/14/2011    MDD (major depressive disorder), recurrent severe, without psychosis ([address]     Overactive bladder 9/14/2011    Prostate cancer ([address]     Suicidal ideations     Urinary incontinence      PAST SURGICAL HISTORY   Past Surgical History:   Procedure Laterality Date    APPENDECTOMY      BACK SURGERY      lumbar disc, and cervical vertabrae    COLONOSCOPY      OTHER SURGICAL HISTORY  02/21/2014    Myelogram    PROSTATECTOMY      TONSILLECTOMY       SOCIAL HISTORY   Tobacco:   reports that he has never smoked.  He has never used smokeless tobacco.  Alcol: reports no history of alcohol use. Illicit Drugs:   reports no history of drug use. FAMILY HISTORY   Family History   Problem Relation Age of Onset    Cancer Sister     Depression Sister     Cancer Brother     Depression Brother      ALLERGIES AND DRUG REACTIONS   Allergies   Allergen Reactions    Ketamine Other (See Comments)     Seizure       CURRENT MEDICATIONS   Current Outpatient Medications   Medication Sig Dispense Refill    levothyroxine (SYNTHROID) 50 MCG tablet Take 1 tablet by mouth daily 90 tablet 3    donepezil (ARICEPT) 10 MG tablet TAKE TWO TABLETS NIGHTLY 60 tablet 2    sildenafil (VIAGRA) 100 MG tablet Take 50 mg by mouth daily as needed for Erectile Dysfunction Take 1/2 tab of  (100 mg tab) by mouth = 50 mg 1 hour before sex      FOLIC ACID PO Take 1 mg by mouth daily       cyanocobalamin 1000 MCG/ML injection Inject 1,000 mcg into the muscle every 30 days (Patient not taking: Reported on 2/7/2022)      venlafaxine (EFFEXOR XR) 75 MG extended release capsule Take 1 capsule by mouth daily 30 capsule 0    ARIPiprazole (ABILIFY) 2 MG tablet Take 1 tablet by mouth daily 30 tablet 0     No current facility-administered medications for this visit. Review of Systems  Constitutional: No fever, no chills, no diaphoresis, no generalized weakness. HEENT: No blurred vision, No sore throat, no ear pain, no hair loss  Neck: denied any neck swelling, difficulty swallowing,   Cadrdiopulomary: No CP, SOB or palpitation, No orthopnea or PND. No cough or wheezing. GI: No N/V/D, no constipation, No abdominal pain, no melena or hematochezia   : Denied any dysuria, hematuria, flank pain, discharge, or incontinence. Skin: denied any rash, ulcer, Hirsute, or hyperpigmentation. MSK: denied any joint deformity, joint pain/swelling, muscle pain, or back pain. Neuro: no numbess, no tingling, no weakness,     OBJECTIVE    There were no vitals taken for this visit.   BP Readings from Last 4 Encounters:   07/12/21 125/71   04/15/21 130/88   06/06/20 122/82   04/02/20 128/80     Wt Readings from Last 6 Encounters:   07/11/21 185 lb (83.9 kg)   04/15/21 185 lb (83.9 kg)   06/06/20 190 lb (86.2 kg)   04/02/20 185 lb (83.9 kg)   03/28/20 194 lb (88 kg)   12/30/19 194 lb 3.2 oz (88.1 kg)     Physical examination:  Due to this being a TeleHealth encounter, evaluation of the following organ systems is limited: Vitals/Constitutional/EENT/Resp/CV/GI//MS/Neuro/Skin/Heme-Lymph-Imm. Modified physical exam through Telemedicine camera    General: Communicating well via camera   Neck: no obvious neck mass. No obvious neck deformity     CVS: no distress   Chest: no distress. Chest is moving with respiration    Extremities:  no visible tremor  Skin: No visible rashes as seen from camera   Musculoskeletal: no visible deformity  Neuro: Alert and oriented to person, place, and time. Psychiatric: Normal mood and affect.  Behavior is normal    Review of Laboratory Data:  I have reviewed the following:  Lab Results   Component Value Date/Time    WBC 11.0 07/11/2021 02:34 AM    RBC 4.14 07/11/2021 02:34 AM    HGB 12.7 07/11/2021 02:34 AM    HCT 38.9 07/11/2021 02:34 AM    MCV 94.0 07/11/2021 02:34 AM    MCH 30.7 07/11/2021 02:34 AM    MCHC 32.6 07/11/2021 02:34 AM    RDW 13.7 07/11/2021 02:34 AM     07/11/2021 02:34 AM    MPV 9.8 07/11/2021 02:34 AM      Lab Results   Component Value Date/Time     09/27/2021 09:34 AM    K 4.4 09/27/2021 09:34 AM    K 3.6 07/11/2021 02:34 AM    CO2 26 09/27/2021 09:34 AM    BUN 16 09/27/2021 09:34 AM    CREATININE 1.3 (H) 09/27/2021 09:34 AM    CALCIUM 9.2 09/27/2021 09:34 AM    LABGLOM 54 09/27/2021 09:34 AM    GFRAA >60 09/27/2021 09:34 AM      Lab Results   Component Value Date/Time    TSH 2.790 03/28/2020 02:25 PM    T4FREE 1.23 01/10/2017 05:23 PM    T1QIVNX 8.0 12/30/2019 08:53 AM    FT3 2.7 06/18/2014 09:00 PM     Lab Results   Component Value Date    GLUCOSE 87

## 2022-11-04 ENCOUNTER — HOSPITAL ENCOUNTER (INPATIENT)
Age: 72
LOS: 5 days | Discharge: HOME | DRG: 885 | End: 2022-11-09
Attending: EMERGENCY MEDICINE | Admitting: PSYCHIATRY & NEUROLOGY
Payer: OTHER GOVERNMENT

## 2022-11-04 DIAGNOSIS — R45.851 DEPRESSION WITH SUICIDAL IDEATION: Primary | ICD-10-CM

## 2022-11-04 DIAGNOSIS — F32.A DEPRESSION WITH SUICIDAL IDEATION: Primary | ICD-10-CM

## 2022-11-04 PROBLEM — Z86.59 HX OF MAJOR DEPRESSION: Status: ACTIVE | Noted: 2022-11-04

## 2022-11-04 LAB
ACETAMINOPHEN LEVEL: <5 MCG/ML (ref 10–30)
ALBUMIN SERPL-MCNC: 4.5 G/DL (ref 3.5–5.2)
ALP BLD-CCNC: 108 U/L (ref 40–129)
ALT SERPL-CCNC: 9 U/L (ref 0–40)
AMPHETAMINE SCREEN, URINE: NOT DETECTED
ANION GAP SERPL CALCULATED.3IONS-SCNC: 10 MMOL/L (ref 7–16)
AST SERPL-CCNC: 17 U/L (ref 0–39)
BARBITURATE SCREEN URINE: NOT DETECTED
BASOPHILS ABSOLUTE: 0.02 E9/L (ref 0–0.2)
BASOPHILS RELATIVE PERCENT: 0.3 % (ref 0–2)
BENZODIAZEPINE SCREEN, URINE: NOT DETECTED
BILIRUB SERPL-MCNC: 0.4 MG/DL (ref 0–1.2)
BILIRUBIN URINE: NEGATIVE
BLOOD, URINE: NEGATIVE
BUN BLDV-MCNC: 10 MG/DL (ref 6–23)
CALCIUM SERPL-MCNC: 9.5 MG/DL (ref 8.6–10.2)
CANNABINOID SCREEN URINE: NOT DETECTED
CHLORIDE BLD-SCNC: 107 MMOL/L (ref 98–107)
CLARITY: CLEAR
CO2: 26 MMOL/L (ref 22–29)
COCAINE METABOLITE SCREEN URINE: NOT DETECTED
COLOR: YELLOW
CREAT SERPL-MCNC: 1.2 MG/DL (ref 0.7–1.2)
EKG ATRIAL RATE: 87 BPM
EKG P AXIS: 68 DEGREES
EKG P-R INTERVAL: 156 MS
EKG Q-T INTERVAL: 366 MS
EKG QRS DURATION: 76 MS
EKG QTC CALCULATION (BAZETT): 440 MS
EKG R AXIS: 64 DEGREES
EKG T AXIS: 52 DEGREES
EKG VENTRICULAR RATE: 87 BPM
EOSINOPHILS ABSOLUTE: 0.18 E9/L (ref 0.05–0.5)
EOSINOPHILS RELATIVE PERCENT: 2.9 % (ref 0–6)
ETHANOL: <10 MG/DL (ref 0–0.08)
FENTANYL SCREEN, URINE: NOT DETECTED
GFR SERPL CREATININE-BSD FRML MDRD: >60 ML/MIN/1.73
GLUCOSE BLD-MCNC: 87 MG/DL (ref 74–99)
GLUCOSE URINE: NEGATIVE MG/DL
HCT VFR BLD CALC: 44 % (ref 37–54)
HEMOGLOBIN: 14.7 G/DL (ref 12.5–16.5)
IMMATURE GRANULOCYTES #: 0.02 E9/L
IMMATURE GRANULOCYTES %: 0.3 % (ref 0–5)
INFLUENZA A: NOT DETECTED
INFLUENZA B: NOT DETECTED
KETONES, URINE: NEGATIVE MG/DL
LEUKOCYTE ESTERASE, URINE: NEGATIVE
LYMPHOCYTES ABSOLUTE: 1.41 E9/L (ref 1.5–4)
LYMPHOCYTES RELATIVE PERCENT: 23 % (ref 20–42)
Lab: NORMAL
MCH RBC QN AUTO: 32 PG (ref 26–35)
MCHC RBC AUTO-ENTMCNC: 33.4 % (ref 32–34.5)
MCV RBC AUTO: 95.7 FL (ref 80–99.9)
METHADONE SCREEN, URINE: NOT DETECTED
MONOCYTES ABSOLUTE: 0.6 E9/L (ref 0.1–0.95)
MONOCYTES RELATIVE PERCENT: 9.8 % (ref 2–12)
NEUTROPHILS ABSOLUTE: 3.91 E9/L (ref 1.8–7.3)
NEUTROPHILS RELATIVE PERCENT: 63.7 % (ref 43–80)
NITRITE, URINE: NEGATIVE
OPIATE SCREEN URINE: NOT DETECTED
OXYCODONE URINE: NOT DETECTED
PDW BLD-RTO: 13.2 FL (ref 11.5–15)
PH UA: 6 (ref 5–9)
PHENCYCLIDINE SCREEN URINE: NOT DETECTED
PLATELET # BLD: 215 E9/L (ref 130–450)
PMV BLD AUTO: 9.6 FL (ref 7–12)
POTASSIUM SERPL-SCNC: 4.1 MMOL/L (ref 3.5–5)
PROTEIN UA: NEGATIVE MG/DL
RBC # BLD: 4.6 E12/L (ref 3.8–5.8)
SALICYLATE, SERUM: <0.3 MG/DL (ref 0–30)
SARS-COV-2 RNA, RT PCR: NOT DETECTED
SODIUM BLD-SCNC: 143 MMOL/L (ref 132–146)
SPECIFIC GRAVITY UA: 1.01 (ref 1–1.03)
TOTAL PROTEIN: 7 G/DL (ref 6.4–8.3)
TRICYCLIC ANTIDEPRESSANTS SCREEN SERUM: NEGATIVE NG/ML
UROBILINOGEN, URINE: 0.2 E.U./DL
WBC # BLD: 6.1 E9/L (ref 4.5–11.5)

## 2022-11-04 PROCEDURE — 99285 EMERGENCY DEPT VISIT HI MDM: CPT

## 2022-11-04 PROCEDURE — 80143 DRUG ASSAY ACETAMINOPHEN: CPT

## 2022-11-04 PROCEDURE — 80307 DRUG TEST PRSMV CHEM ANLYZR: CPT

## 2022-11-04 PROCEDURE — 80179 DRUG ASSAY SALICYLATE: CPT

## 2022-11-04 PROCEDURE — 93010 ELECTROCARDIOGRAM REPORT: CPT | Performed by: INTERNAL MEDICINE

## 2022-11-04 PROCEDURE — 80053 COMPREHEN METABOLIC PANEL: CPT

## 2022-11-04 PROCEDURE — 82077 ASSAY SPEC XCP UR&BREATH IA: CPT

## 2022-11-04 PROCEDURE — 93005 ELECTROCARDIOGRAM TRACING: CPT | Performed by: EMERGENCY MEDICINE

## 2022-11-04 PROCEDURE — 1240000000 HC EMOTIONAL WELLNESS R&B

## 2022-11-04 PROCEDURE — 87636 SARSCOV2 & INF A&B AMP PRB: CPT

## 2022-11-04 PROCEDURE — 85025 COMPLETE CBC W/AUTO DIFF WBC: CPT

## 2022-11-04 PROCEDURE — 81003 URINALYSIS AUTO W/O SCOPE: CPT

## 2022-11-04 RX ORDER — HALOPERIDOL 5 MG/ML
5 INJECTION INTRAMUSCULAR EVERY 4 HOURS PRN
Status: DISCONTINUED | OUTPATIENT
Start: 2022-11-04 | End: 2022-11-09 | Stop reason: HOSPADM

## 2022-11-04 RX ORDER — ACETAMINOPHEN 325 MG/1
650 TABLET ORAL EVERY 4 HOURS PRN
Status: DISCONTINUED | OUTPATIENT
Start: 2022-11-04 | End: 2022-11-09 | Stop reason: HOSPADM

## 2022-11-04 RX ORDER — TRAZODONE HYDROCHLORIDE 50 MG/1
50 TABLET ORAL NIGHTLY PRN
Status: DISCONTINUED | OUTPATIENT
Start: 2022-11-05 | End: 2022-11-09 | Stop reason: HOSPADM

## 2022-11-04 RX ORDER — POLYETHYLENE GLYCOL 3350 17 G/17G
17 POWDER, FOR SOLUTION ORAL DAILY PRN
Status: DISCONTINUED | OUTPATIENT
Start: 2022-11-04 | End: 2022-11-09 | Stop reason: HOSPADM

## 2022-11-04 RX ORDER — HALOPERIDOL 5 MG/1
5 TABLET ORAL EVERY 4 HOURS PRN
Status: DISCONTINUED | OUTPATIENT
Start: 2022-11-04 | End: 2022-11-09 | Stop reason: HOSPADM

## 2022-11-04 ASSESSMENT — PAIN - FUNCTIONAL ASSESSMENT
PAIN_FUNCTIONAL_ASSESSMENT: NONE - DENIES PAIN
PAIN_FUNCTIONAL_ASSESSMENT: NONE - DENIES PAIN

## 2022-11-04 NOTE — ED NOTES
JOHNIE reached out to the [place] at [phone] ext [phone] and provided demographic information. Awaiting a call back.       YOU Sommer, Dianna Check  11/04/22 4607

## 2022-11-04 NOTE — ED NOTES
SW called attempted to reach out to the [place] at [phone] ext [phone] and left a voicemail for the transfer center. JOHNIE will follow up.             YOU Chavez, [place]  11/04/22 3944

## 2022-11-04 NOTE — ED PROVIDER NOTES
Department of Emergency Medicine   ED  Provider Note  Admit Date/RoomTime: 11/4/2022 10:51 AM  [facility]              11/4/22  11:06 AM EDT    HPI: Jasvir Aaron 67 y.o. male presents with a complaint of depression and suicidal ideation beginning a few days ago. Complaint has been constant and became more severe today which is what prompted the visit. Worsening depression with suicidal ideation. Numerous thoughts to kills self. Multiple prior attempts. Went to the [facility] today, was pink slipped with plans to admit to [facility]. He is depressed and suicidal. He denies other complaints. Review of Systems:   A complete review of systems was performed and pertinent positives and negatives are stated within HPI, all other systems reviewed and are negative.            --------------------------------------------- PAST HISTORY ---------------------------------------------  Past Medical History:  has a past medical history of Cancer ([facility] Depression, Major depressive disorder, recurrent episode, severe ([facility] MDD (major depressive disorder), recurrent severe, without psychosis ([facility] [facility] Overactive bladder, Prostate cancer ([facility] [address] [facility] Suicidal ideations, and Urinary incontinence. Past Surgical History:  has a past surgical history that includes other surgical history (02/21/2014); back surgery; Prostatectomy; Appendectomy; Tonsillectomy; and Colonoscopy. Social History:  reports that he has never smoked. He has never used smokeless tobacco. He reports that he does not drink alcohol and does not use drugs. Family History: family history includes Cancer in his brother and sister; Depression in his brother and sister. Family history is non contributory unless otherwise noted    The patients home medications have been reviewed.     Allergies: Ketamine    -------------------------------------------------- RESULTS -------------------------------------------------  All laboratory and imaging studies were reviewed by myself.     LABS: reviewed and interpreted by me  Results for orders placed or performed during the hospital encounter of 11/04/22   COVID-19 & Influenza Combo    Specimen: Nasopharyngeal Swab   Result Value Ref Range    SARS-CoV-2 RNA, RT PCR NOT DETECTED NOT DETECTED    INFLUENZA A NOT DETECTED NOT DETECTED    INFLUENZA B NOT DETECTED NOT DETECTED   Comprehensive Metabolic Panel   Result Value Ref Range    Sodium 143 132 - 146 mmol/L    Potassium 4.1 3.5 - 5.0 mmol/L    Chloride 107 98 - 107 mmol/L    CO2 26 22 - 29 mmol/L    Anion Gap 10 7 - 16 mmol/L    Glucose 87 74 - 99 mg/dL    BUN 10 6 - 23 mg/dL    Creatinine 1.2 0.7 - 1.2 mg/dL    Est, Glom Filt Rate >60 >=60 mL/min/1.73    Calcium 9.5 8.6 - 10.2 mg/dL    Total Protein 7.0 6.4 - 8.3 g/dL    Albumin 4.5 3.5 - 5.2 g/dL    Total Bilirubin 0.4 0.0 - 1.2 mg/dL    Alkaline Phosphatase 108 40 - 129 U/L    ALT 9 0 - 40 U/L    AST 17 0 - 39 U/L   CBC with Auto Differential   Result Value Ref Range    WBC 6.1 4.5 - 11.5 E9/L    RBC 4.60 3.80 - 5.80 E12/L    Hemoglobin 14.7 12.5 - 16.5 g/dL    Hematocrit 44.0 37.0 - 54.0 %    MCV 95.7 80.0 - 99.9 fL    MCH 32.0 26.0 - 35.0 pg    MCHC 33.4 32.0 - 34.5 %    RDW 13.2 11.5 - 15.0 fL    Platelets 271 313 - 329 E9/L    MPV 9.6 7.0 - 12.0 fL    Neutrophils % 63.7 43.0 - 80.0 %    Immature Granulocytes % 0.3 0.0 - 5.0 %    Lymphocytes % 23.0 20.0 - 42.0 %    Monocytes % 9.8 2.0 - 12.0 %    Eosinophils % 2.9 0.0 - 6.0 %    Basophils % 0.3 0.0 - 2.0 %    Neutrophils Absolute 3.91 1.80 - 7.30 E9/L    Immature Granulocytes # 0.02 E9/L    Lymphocytes Absolute 1.41 (L) 1.50 - 4.00 E9/L    Monocytes Absolute 0.60 0.10 - 0.95 E9/L    Eosinophils Absolute 0.18 0.05 - 0.50 E9/L    Basophils Absolute 0.02 0.00 - 0.20 E9/L   Serum Drug Screen   Result Value Ref Range    Ethanol Lvl <10 mg/dL    Acetaminophen Level <5.0 (L) 10.0 - 46.3 mcg/mL    Salicylate, Serum <4.0 0.0 - 30.0 mg/dL    TCA Scrn NEGATIVE Cutoff:300 ng/mL Urine Drug Screen   Result Value Ref Range    Amphetamine Screen, Urine NOT DETECTED Negative <1000 ng/mL    Barbiturate Screen, Ur NOT DETECTED Negative < 200 ng/mL    Benzodiazepine Screen, Urine NOT DETECTED Negative < 200 ng/mL    Cannabinoid Scrn, Ur NOT DETECTED Negative < 50ng/mL    Cocaine Metabolite Screen, Urine NOT DETECTED Negative < 300 ng/mL    Opiate Scrn, Ur NOT DETECTED Negative < 300ng/mL    PCP Screen, Urine NOT DETECTED Negative < 25 ng/mL    Methadone Screen, Urine NOT DETECTED Negative <300 ng/mL    Oxycodone Urine NOT DETECTED Negative <100 ng/mL    FENTANYL SCREEN, URINE NOT DETECTED Negative <1 ng/mL    Drug Screen Comment: see below    Urinalysis   Result Value Ref Range    Color, UA Yellow Straw/Yellow    Clarity, UA Clear Clear    Glucose, Ur Negative Negative mg/dL    Bilirubin Urine Negative Negative    Ketones, Urine Negative Negative mg/dL    Specific Gravity, UA 1.010 1.005 - 1.030    Blood, Urine Negative Negative    pH, UA 6.0 5.0 - 9.0    Protein, UA Negative Negative mg/dL    Urobilinogen, Urine 0.2 <2.0 E.U./dL    Nitrite, Urine Negative Negative    Leukocyte Esterase, Urine Negative Negative   EKG 12 Lead   Result Value Ref Range    Ventricular Rate 87 BPM    Atrial Rate 87 BPM    P-R Interval 156 ms    QRS Duration 76 ms    Q-T Interval 366 ms    QTc Calculation (Bazett) 440 ms    P Axis 68 degrees    R Axis 64 degrees    T Axis 52 degrees       RADIOLOGY:  Interpreted by Radiologist.  No orders to display       EKG Interpretation  Interpreted by emergency department physician, Dr. Brent Tolentino     Rhythm: normal sinus   Rate: normal  Axis: normal  Conduction: normal  ST Segments: no acute change  T Waves: no acute change    Clinical Impression: Sinus rhythm, no acute ischemic changes    Comparison to Old EKG  stable from prior        ------------------------- NURSING NOTES AND VITALS REVIEWED ---------------------------   The nursing notes within the ED encounter and vital signs as below have been reviewed. BP (!) 143/93   Pulse 82   Temp 97.6 °F (36.4 °C)   Resp 17   Wt 185 lb (83.9 kg)   SpO2 99%   BMI 23.75 kg/m²   Oxygen Saturation Interpretation: Normal            ---------------------------------------------------PHYSICAL EXAM--------------------------------------      Constitutional/General: Alert and oriented x3   Head: Normocephalic, atraumatic  Eyes: PERRL, EOMI  ENT: Oropharynx clear, handling secretions, no trismus  Neck: Supple, full ROM, no meningeal signs  Pulmonary: Lungs clear to auscultation bilaterally, no wheezes, rales, or rhonchi. Not in respiratory distress  Cardiovascular:  Regular rate and rhythm, no murmurs, gallops, or rubs. 2+ distal pulses  Extremities: Moves all extremities x 4. Warm and well perfused, no clubbing, cyanosis, or edema. Capillary refill <3 seconds  Skin: warm and dry without rash  Neurologic: GCS 15, no focal deficits  Psych: flat Affect      ------------------------------------------ PROGRESS NOTES ------------------------------------------     Medical decision making:  Patient is medically stable for mental health evaluation    Consultations:   Behavioral Health    Re-Evaluations:        Counseling: The emergency provider has spoken with thepatient and discussed todays results, in addition to providing specific details for the plan of care and counseling regarding the diagnosis and prognosis. Questions are answered at this time and they are agreeable with the plan.         --------------------------------- IMPRESSION AND DISPOSITION ---------------------------------    IMPRESSION  1.  Depression with suicidal ideation        DISPOSITION  Disposition: as per consultant  Patient condition is stable            Karrie Barajas MD  11/04/22 1572

## 2022-11-04 NOTE — ED NOTES
Pt awake laying in bed. Denies SOB, heart pain. Resp easy non labored. Pt up walked to BR per self. Gait steady.       Tisha Askew RN  11/04/22 1953

## 2022-11-04 NOTE — ED NOTES
Behavioral Health Crisis Assessment      Chief Complaint:  Pt reported to  that he is here due to an increase in depression and iqbal cidal thoughts. Mental Status Exam:  Pt is alert oriented x 4, flat affect, depressed mood, overall sad, withdrawn, behavior cooperative and calm, no signs of agitation, congruent affect, slightly guarded but forthcoming, thought process appears logical, speech clear and within normal range, normal eye contact, well groomed. Pt reports to normal appetite and sleep patterns. Pt denies to having any auditory/visual hallucinations, delusions, paranoia and none evident in interview. Legal Status  [] Voluntary:  [x] Involuntary, Issued by: by Errol Duran MD from the [place] for \"20 yo  with major depression, severe, recurrent and dysthymic disorder and history of multiple suicide attempts presents today with suicidal ideation. Over several days this week he had been contemplating suicide, thought shooting self, but has no gun, thought abut overdosing but wife locks up meds, then thought about hanging and cutting himself. Only thought that stopped him was that wife would not be able to sell their home if he killed himself in it. He did not reach out to anyone to let them know he was suicidal, he did not reach out to help but did come in today. Mr. Julieta Rojas has been severely depressed for moths, with gradual downward slide. His depression has been resistant to medications. He has been isolating more and more and in the last month or so, he has been cancelling all his medical and [facility] appointments. He has not implemented his suicide prevention plan. Halimat typically has acted on his suicide ideations in the past without letting anyone know. He would be hospitalized after attempts, this time needs to be hospitalized before be makes the attempt. He himself agreed that this is the case, that he needs help, and needs to go to the hospital\".       Gender  [x] Male [] Female [] Transgender  [] Other    Sexual Orientation    [x] Heterosexual [] Homosexual [] Bisexual [] Other    Brief Clinical Summary:  Pt is a 68 yo male who presented into the ED by EMS after being Celina Slipped by the [facility]. [facility] and drove in for the appointment and was sent her to go to [facility]. Pt does admit to an increase in depression and suicidal ideations. Pt was asked if he has a specific plan. Pt reports he has thought of a couple of ideas but would not share at this time. Pt denies to any specific trigger but does share he stared feeling like this last weekend. Pt does admit to recent getting legally  from his wife finically and unfolding out all the details of that has been stressful. Pt does have a hx of 3 lifetime suicide attempts with his last one being in January 2021 when drinking antifreeze. Pt denies to any self injurious behaviors, A/V hallucinations or HI. Pt denies to having a legal guardian or POA. Collateral Information:   No collateral information obtained at this time.      Risk Factors:  Several MH hospitalizations  Isolation  Hx of trauma  Gender risk (male over 61)  Family hx of MH and alcohol abuse   Suicide attempts  Life altering event  MH diagnoses     Protective Factors:  social connectedness to family  [facility] & [facility]/ medication compliant  help-seeking behavior   Steady income  goals & hope for the future  safe and stable housing  has access to essential needs  good communication skills  no access to weapons    Suicidal Ideations:   [x] Reports:    [x] Past [x] Present   [] Denies    Suicide Attempts:  [x] Reports:   [] Denies    C-SSRS Screening Completed by RN: Current Suicide Risk:  [] No Risk [] Low [] Moderate [x] High    Homicidal Ideations  [] Reports:   [] Past [] Present   [x] Denies     Self Injurious/Self Mutilation Behaviors:   [] Reports:    [] Past [] Present   [x] Denies    Hallucinations/Delusions   [] Reports:   [x] Denies     Substance Use/Alcohol Use/Addiction:   [] Reports: Pt does admit to a hx of alcohol abuse 40+ years ago. [x] Denies   [x] SBIRT Screen Complete. Current or Past Substance Abuse Treatment  [x] Yes, When and Where: Pt does have a hx of treatment and is current involved with AA meetings and does have a sponsor named Daysi Kong who he speaks to weekly. [] No    Current or Past Mental Health Treatment:  [x] Yes, When and Where: Pt does admit to a hx of MH and being diagnosed with major depression. Pt currently treats at the [facility] on on [address] for medication management and counseling services. Pt does admit to being compliant with daily medications. Pt reports his wife give him his medications due to a hx of overdoses. Pt does have a hx of [address] hospitalization with his last one being on 4/2/2020 at [facility]. Pt is requesting to go to [facility] as his first option. [] No    Legal Issues:  []  Yes (Specify)  [x]  No    Access to Weapons:  []  Yes (Specify)  [x]  No    Trauma History  [x] Reports: 2 years in the [employer]   [] Denies     Living Situation:  Living with his wife     Employment:  Pension     Education Level:  HS + 1 year of art school     Violence Risk Screening:        Have you ever thought about hurting someone? [x]  No  []  Yes (Ask the questions listed below)   When? Did you follow through with the thoughts? [x] No     [] Yes- When and what happened? 2.  Have you ever threatened anyone? [x]  No  []  Yes (Ask the questions listed below)   When and what happened? Have you ever threatened someone with a gun, knife or other weapon? [x]  No  []  Yes - When and what happened? 2. Have you ever had an order of protection taken out against you? []  Yes [x]  No  3. Have you ever been arrested due to violence? []  Yes [x]  No  4. Have you ever been cruel to animals?  []  Yes [x]  No    After consideration of C-SSRS screening results, C-SSRS assessments, and this professional's assessment the patient's overall suicide risk assessed to be:  [] No Risk  [] Low   [] Moderate   [x] High     [x] Discussed current suicide risk, protective and risk factors with RN and ED Physician     Disposition   [] Home:   [] Outpatient Provider:   [] Crisis Unit:   [x] Inpatient Psychiatric Unit:  [] Other:     [facility]. Once medically cleared, SW will proceed with inpatient admission to ensure Pt safety and stabilization.       YOU Cervantes, [place]  11/04/22 2616

## 2022-11-05 PROBLEM — Z86.59 HX OF MAJOR DEPRESSION: Status: RESOLVED | Noted: 2022-11-04 | Resolved: 2022-11-05

## 2022-11-05 PROCEDURE — 6370000000 HC RX 637 (ALT 250 FOR IP): Performed by: NURSE PRACTITIONER

## 2022-11-05 PROCEDURE — 1240000000 HC EMOTIONAL WELLNESS R&B

## 2022-11-05 RX ORDER — LEVOTHYROXINE SODIUM 0.05 MG/1
50 TABLET ORAL DAILY
Status: DISCONTINUED | OUTPATIENT
Start: 2022-11-05 | End: 2022-11-09 | Stop reason: HOSPADM

## 2022-11-05 RX ORDER — CITALOPRAM 20 MG/1
10 TABLET ORAL DAILY
Status: DISCONTINUED | OUTPATIENT
Start: 2022-11-05 | End: 2022-11-09 | Stop reason: HOSPADM

## 2022-11-05 RX ADMIN — CITALOPRAM HYDROBROMIDE 10 MG: 20 TABLET ORAL at 14:10

## 2022-11-05 RX ADMIN — LEVOTHYROXINE SODIUM 50 MCG: 0.05 TABLET ORAL at 14:10

## 2022-11-05 ASSESSMENT — SLEEP AND FATIGUE QUESTIONNAIRES
DO YOU HAVE DIFFICULTY SLEEPING: NO
AVERAGE NUMBER OF SLEEP HOURS: 11
DO YOU USE A SLEEP AID: NO
AVERAGE NUMBER OF SLEEP HOURS: 10
SLEEP PATTERN: NORMAL
DO YOU USE A SLEEP AID: NO
DO YOU HAVE DIFFICULTY SLEEPING: NO
SLEEP PATTERN: NORMAL

## 2022-11-05 ASSESSMENT — PATIENT HEALTH QUESTIONNAIRE - PHQ9: SUM OF ALL RESPONSES TO PHQ QUESTIONS 1-9: 6

## 2022-11-05 ASSESSMENT — LIFESTYLE VARIABLES
HOW OFTEN DO YOU HAVE A DRINK CONTAINING ALCOHOL: NEVER
HOW OFTEN DO YOU HAVE A DRINK CONTAINING ALCOHOL: NEVER
HOW MANY STANDARD DRINKS CONTAINING ALCOHOL DO YOU HAVE ON A TYPICAL DAY: PATIENT DOES NOT DRINK
HOW MANY STANDARD DRINKS CONTAINING ALCOHOL DO YOU HAVE ON A TYPICAL DAY: PATIENT DOES NOT DRINK

## 2022-11-05 NOTE — PROGRESS NOTES
[facility]  Admission Note     Admission Type:   Admission Type: Involuntary    Reason for admission:  Reason for Admission: Patient stated that he just started going down hill on Monday he could tell he was getting more depressed then started having Suicidal thoughts that he might \"cut his veins open or hang himself\" Patient said that he was feeling slightly better now and hasn't had those thoughts since wednesday. \"    PATIENT STRENGTHS:       Patient Strengths and Limitations:       Addictive Behavior:        Medical Problems:   Past Medical History:   Diagnosis Date    Cancer ([facility]     Depression     [facility] 9/14/2011    MDD (major depressive disorder), recurrent severe, without psychosis ([facility]     Overactive bladder 9/14/2011    Prostate cancer ([facility]     Suicidal ideations     Urinary incontinence        Status EXAM:  Mental Status and Behavioral Exam  Normal: No  Level of Assistance: Independent/Self  Facial Expression: Flat, Sad  Affect: Congruent  Level of Consciousness: Alert  Frequency of Checks: 4 times per hour, close  Mood:Normal: No  Mood: Depressed, Anxious, Sad  Motor Activity:Normal: Yes  Eye Contact: Fair  Observed Behavior: Cooperative, Friendly  Sexual Misconduct History: Current - no  Preception: [place] to person, [place] to time, [place] to place, [place] to situation  Attention:Normal: Yes  Thought Processes: Circumstantial  Thought Content:Normal: Yes  Thought Content: Other (comment)  Depression Symptoms: Change in energy level, Feelings of helplessness, Feelings of hopelessess, Feelings of worthlessness, Sleep disturbance  Anxiety Symptoms: Generalized  Maribeth Symptoms: No problems reported or observed.   Hallucinations: None  Delusions: No  Memory:Normal: Yes  Insight and Judgment: Yes    Tobacco Screening:  Practical Counseling, on admission, sarah X, if applicable and completed (first 3 are required if patient doesn't refuse): (x )  Recognizing danger situations (included triggers and roadblocks)                    ( x)  Coping skills (new ways to manage stress, exercise, relaxation techniques, changing routine, distraction)                                                           ( )  Basic information about quitting (benefits of quitting, techniques in how to quit, available resources  ( ) Referral for counseling faxed to [facility]                                             ( x) Patient refused counseling  (x ) Patient has not smoked in the last 30 days    Metabolic Screening:    No results found for: LABA1C    Lab Results   Component Value Date    CHOL 227 (H) 09/11/2017    CHOL 220 (H) 06/22/2017     Lab Results   Component Value Date    TRIG 118 09/11/2017    TRIG 204 (H) 06/22/2017     Lab Results   Component Value Date    HDL 40 09/11/2017    HDL 30 06/22/2017     No components found for: LDLCAL  Lab Results   Component Value Date    LABVLDL 24 09/11/2017    LABVLDL 41 06/22/2017         Body mass index is 23.11 kg/m². BP Readings from Last 2 Encounters:   11/04/22 136/78   07/12/21 125/71           Pt admitted with followings belongings:  Dental Appliances: Uppers, Lowers  Vision - Corrective Lenses: Eyeglasses  Hearing Aid: Bilateral hearing aids  Jewelry: None  Body Piercings Removed: N/A  Clothing: Footwear, Shirt, Pants, Socks, Belt (wht florence, tan pant,sera sweater, belt, sera shoes,)  Other Valuables: Money, Wallet (brn wallet, $14)     Patient's home medications were left at home. Patient oriented to surroundings and program expectations and copy of patient rights given. Received admission packet:  yes. Consents reviewed, signed yes. Patient verbalize understanding:  yes.   Patient education on precautions: yes                   Randee Garcia RN
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
## 2022-11-05 NOTE — PLAN OF CARE
[facility]  Initial Interdisciplinary Treatment Plan NOTE    Review Date & Time: 11/5/22 1500    Patient was in treatment team    Admission Type:   Admission Type: Involuntary    Reason for admission:  Reason for Admission: Patient stated that he just started going down hill on Monday he could tell he was getting more depressed then started having Suicidal thoughts that he might \"cut his veins open or hang himself\" Patient said that he was feeling slightly better now and hasn't had those thoughts since wednesday. \"      Estimated Length of Stay Update:  3-5 days  Estimated Discharge Date Update: 5-7 days    EDUCATION:   Learner Progress Toward Treatment Goals: Reviewed results and recommendations of this team    Method: Group    Outcome: Verbalized understanding    PATIENT GOALS: \"want to rest and take advantage of opportunity\"    PLAN/TREATMENT RECOMMENDATIONS UPDATE:day 3    GOALS UPDATE:   Time frame for Short-Term Goals: 3-5 days    Jc Calvert RN The patient is a 61y Female complaining of ankle pain/injury.

## 2022-11-05 NOTE — ED NOTES
Pt transported to floor via w/c and [facility] tech.       Cale Cruz RN  11/04/22 9392

## 2022-11-05 NOTE — ED NOTES
Pt was agreeable to be reviewed for admission here at [facility] ([facility]) due to not being able to get in contact with [facility] for admission. Patient has been accepted for admission to [facility] by Jolanta Koenig NP under Dr. Severo De Leon. Patient will go to room 7304A. Called admitting and notified Xuan Walls. LINDA RN is aware to call nurse to nurse and put in for patient transport.      YOU Albarran, [facility]  11/04/22 0020

## 2022-11-05 NOTE — ED NOTES
SW called attempted to reach out to the [facility] at [phone] ext [phone], [address], [id number] and left a voicemail for the transfer center. JOHNIE will follow up.       YOU Allred, [place]  11/04/22 2021

## 2022-11-05 NOTE — CARE COORDINATION
Biopsychosocial Assessment Note    Social work met with patient to complete the biopsychosocial assessment and C-SSRS. Chief Complaint: Per pt report, \"I was in a really dark place this week\"    Mental Status Exam: Pt appeared to be alert and oriented x 4. Pt was friendly and cooperative throughout this 's assessment. Pt's thought process is preoccupied, speech is clear. Pt's eye-contact is fair. Pt's affect is flat. Clinical Summary: Pt states that his last admission to a psychiatric facility was within the past two years, but the pt is unsure of an exact date. Pt states that prior to admission, he was in a really \"dark place. \" Pt states that he had a week where he could not \"pull\" himself out of a depression. Pt states that he began to have suicidal ideation with no plan or intent. Pt admits to past suicide attempts, the most recent being in 2020, and the first attempt being 40 years ago. Pt denied a history of self-injurious behaviors. Pt is currently denying SI/ HI/ hallucinations/ delusions. Pt denied substance use. Pt denied trauma history. Pt will return home with his spouse at discharge. Pt will continue to treat outpatient at the [facility]. Risk Factors: Past suicide attempts, psychiatric hospitalization hx, and pt is a . Protective Factors: Family support, stable housing, active at the [place], help-seeking behavior, good communication, and no substance use.     Gender  [x] Male [] Female [] Transgender  [] Other    Sexual Orientation    [x] Heterosexual [] Homosexual [] Bisexual [] Other    Suicidal Ideation  [x] Past [] Present [] Denies     C-SSRS Screening Completed: Current Suicide Risk:  [] No Risk  [] Low [x] Moderate [] High    Homicidal Ideation  [] Past [] Present [x] Denies     Hallucinations/Delusions (Specify type)  [] Reports [x] Denies     Current or Past Mental Health Treatment:  [x] [facility]  [] No    Substance Use/Alcohol Use/Addiction  [] Reports [x] Denies     Tobacco Use (within the last 6 months)  [] Reports [x] Denies     Trauma History  [] Reports [x] Denies     Self Injurious/Self Mutilation Behaviors:   [] Reports:    [] Past [] Present   [x] Denies    Legal History:  [x]  Yes (Specify)  past DUI  [] No    Collateral Contact (ALEXIS signed)  Name:  She Gutierrez  Relationship: Wife  Number: [phone]    Collateral Information:      Access to Weapons per Collateral Contact: [] Reports [x] Denies     After consideration of C-SSRS screening results, C-SSRS assessments, and this professional's assessment the patient's overall suicide risk assessed to be:  [] None   [] Low   [x] Moderate   [] High     [x] Discussed current suicide risk, protective and risk factors with RN and NP/Psychiatrist.    Discharge Plan:  [x] Home: with wife  [] Shelter:  [] Crisis Unit:  [] Substance Abuse Rehab:  [] Nursing Facility:  [] Other (Specify):     Follow up Provider: [place]

## 2022-11-05 NOTE — BH NOTE
Patient alert and oriented x 4. Patient has a  flat affect however, patient brightened with conversation. Patient appears depressed and anxious. Patient is friendly and cooperative. Patient appears withdrawn, guarded and preoccupied. Patient denies any suicidal ideations, homicidal ideations, auditory and visual hallucinations. Patient reports a little \"depression and anxiety at this time. \" Patient states, \"I miss my dog. \" Patient denies any pain at this time. Patient is isolative to his room at this time. Patients states goal is to \"rest and take advantage of opportunity. \" Patient does not appear in any distress at this time. Patient encouraged to attend groups. Will continue to monitor patient every 15 minute rounds to ensure patient safety.

## 2022-11-05 NOTE — ED NOTES
Pt reviewed wit Jacquelyn Crowe NP. Pt accepted to [facility] with DX of major depression disorder.       Magdiel Alfaro RN  11/04/22 2116

## 2022-11-05 NOTE — GROUP NOTE
Group Therapy Note    Date: 11/5/2022    Group Start Time: 3780  Group End Time: 2725  Group Topic: Psychoeducation    [facility] ACUTE BH 1    Carola Myles, [address]                                                                        Group Therapy Note    Date: 11/5/2022    Wellness Binder Information  Module Name:  physical and social wellness     Patient's Goal:  Patient will be able to id what small changes one can make to increase his/her own wellness. Notes:  Pleasant and sharing in group willing to share when prompted. Status After Intervention:  Improved    Participation Level:  Active Listener and Interactive    Participation Quality: Appropriate, Attentive, Sharing, and Supportive      Speech:  normal      Thought Process/Content: Logical      Affective Functioning: Congruent      Mood: euthymic      Level of consciousness:  Alert, Oriented x4, and Attentive      Response to Learning: Able to verbalize/acknowledge new learning, Able to retain information, and Progressing to goal      Endings: None Reported    Modes of Intervention: Education, Support, Socialization, Exploration, and Problem-solving      Discipline Responsible: Psychoeducational Specialist      Signature:  Marysol Guzman

## 2022-11-05 NOTE — BH NOTE
Patient reassessed at this time. Patient is in his room, in his bed at this time. Patient completed MOCHA assessment at this time. Patient scored 25/30. Patient states \"I did this a few times. \" Patient denies any suicidal ideations, homicidal ideations, auditory or visual hallucinations at this time. Patient reports \"a little anxiety and depression\" at this time. Patient denies any pain at this time. Patient does not appear in any distress at this time. Will continue to monitor patient every 15 minute rounds to ensure patient safety.

## 2022-11-05 NOTE — PLAN OF CARE
Problem: Anxiety  Goal: Will report anxiety at manageable levels  Description: INTERVENTIONS:  1. Administer medication as ordered  2. Teach and rehearse alternative coping skills  3. Provide emotional support with 1:1 interaction with staff  Outcome: Progressing  Flowsheets (Taken 11/5/2022 1110)  Will report anxiety at manageable levels:   Administer medication as ordered   Teach and rehearse alternative coping skills     Problem: Coping  Goal: Pt/Family able to verbalize concerns and demonstrate effective coping strategies  Description: INTERVENTIONS:  1. Assist patient/family to identify coping skills, available support systems and cultural and spiritual values  2. Provide emotional support, including active listening and acknowledgement of concerns of patient and caregivers  3. Reduce environmental stimuli, as able  4. Instruct patient/family in relaxation techniques, as appropriate  5. Assess for spiritual pain/suffering and initiate Spiritual Care, Psychosocial Clinical Specialist consults as needed  Outcome: Progressing  Flowsheets (Taken 11/5/2022 1110)  Patient/family able to verbalize anxieties, fears, and concerns, and demonstrate effective coping:   Assist patient/family to identify coping skills, available support systems and cultural and spiritual values   Provide emotional support, including active listening and acknowledgement of concerns of patient and caregivers   Reduce environmental stimuli, as able   Instruct patient/family in relaxation techniques, as appropriate   Assess for spiritual pain/suffering and initiate Spiritual Care, Psychosocial Clinical Specialist consults as needed     Problem: Depression/Self Harm  Goal: Effect of psychiatric condition will be minimized and patient will be protected from self harm  Description: INTERVENTIONS:  1. Assess impact of patient's symptoms on level of functioning, self care needs and offer support as indicated  2.  Assess patient/family knowledge of depression, impact on illness and need for teaching  3. Provide emotional support, presence and reassurance  4. Assess for possible suicidal thoughts or ideation. If patient expresses suicidal thoughts or statements do not leave alone, initiate Suicide Precautions, move to a room close to the nursing station and obtain sitter  5. Initiate consults as appropriate with Mental Health Professional, Spiritual Care, Psychosocial CNS, and consider a recommendation to the [facility] for a Psychiatric Consultation  Outcome: Progressing  Flowsheets  Taken 11/5/2022 1125  Effect of psychiatric condition will be minimized and patient will be protected from self harm:   Assess patient/family knowledge of depression, impact on illness and need for teaching   Assess impact of patients symptoms on level of functioning, self care needs and offer support as indicated   Provide emotional support, presence and reassurance   Assess for suicidal thoughts or ideation. If patient expresses suicidal thoughts or statements do not leave alone, initiate Suicide Precautions, move near nurse station, obtain sitter   Initiate consults as appropriate with Mental Health Professional, Spiritual Care, Psychosocial CNS, and consider a recommendation to the [facility] for a Psychiatric Consultation  Taken 11/5/2022 1110  Effect of psychiatric condition will be minimized and patient will be protected from self harm:   Assess impact of patients symptoms on level of functioning, self care needs and offer support as indicated   Assess patient/family knowledge of depression, impact on illness and need for teaching   Provide emotional support, presence and reassurance   Assess for suicidal thoughts or ideation.  If patient expresses suicidal thoughts or statements do not leave alone, initiate Suicide Precautions, move near nurse station, obtain sitter   Initiate consults as appropriate with Mental Health Professional, Spiritual Care, Psychosocial CNS, and consider a recommendation to the [facility] for a Psychiatric Consultation     Problem: Involuntary Admit  Goal: Will cooperate with staff recommendations and doctor's orders and will demonstrate appropriate behavior  Description: INTERVENTIONS:  1. Treat underlying conditions and offer medication as ordered  2. Educate regarding involuntary admission procedures and rules  3.  Contain excessive/inappropriate behavior per unit and hospital policies  Outcome: Progressing  Flowsheets (Taken 11/5/2022 1110)  Will cooperate with staff recommendations and doctor's orders and will demonstrate appropriate behavior:   Treat underlying conditions and offer medication as ordered   Educate regarding involuntary admission procedures and rules   Contain excessive/inappropriate behavior per unit and hospital policies

## 2022-11-06 PROCEDURE — 1240000000 HC EMOTIONAL WELLNESS R&B

## 2022-11-06 PROCEDURE — 6370000000 HC RX 637 (ALT 250 FOR IP): Performed by: NURSE PRACTITIONER

## 2022-11-06 RX ORDER — ATORVASTATIN CALCIUM 20 MG/1
20 TABLET, FILM COATED ORAL NIGHTLY
Status: ON HOLD | COMMUNITY
End: 2022-11-08 | Stop reason: HOSPADM

## 2022-11-06 RX ADMIN — LEVOTHYROXINE SODIUM 50 MCG: 0.05 TABLET ORAL at 09:40

## 2022-11-06 RX ADMIN — CITALOPRAM HYDROBROMIDE 10 MG: 20 TABLET ORAL at 09:40

## 2022-11-06 NOTE — GROUP NOTE
Group Therapy Note    Date: 2022    Group Start Time: 215  Group End Time: 300  Group Topic: Psychoeducation    [facility]    PAULY Degroot                                                                        Group Therapy Note      Type of Group: Psychoeducation    Wellness Binder Information  Module Name:  id of daily and life events     Patient's Goal:  Patient pleasant and able to id daily stressors and positive ways to manage them in the future. Notes:  Patient able to share when prompted,willing to accept handout. Status After Intervention:  Improved    Participation Level:  Active Listener and Interactive    Participation Quality: Appropriate, Attentive, Sharing, and Supportive      Speech:  normal      Thought Process/Content: Logical      Affective Functioning: Congruent      Mood: euthymic      Level of consciousness:  Alert, Oriented x4, and Attentive      Response to Learning: Able to verbalize/acknowledge new learning, Able to retain information, and Progressing to goal      Endings: None Reported    Modes of Intervention: Education, Support, Socialization, Exploration, and Problem-solving      Discipline Responsible: Psychoeducational Specialist      Signature:  PAULY Degroot            Patient's Goal:  ***    Notes:  ***    Status After Intervention:  {Status After Intervention:076980613}    Participation Level: {Participation Level:014448958}    Participation Quality: {[facility] PARTICIPATION QUALITY:851247103}      Speech:  {ED  CD_SPEECH:54511}      Thought Process/Content: {Thought Process/Content:778620667}      Affective Functioning: {Affective Functionin}      Mood: {Mood:825632035}      Level of consciousness:  {Level of consciousness:595665811}      Response to Learning: {[facility] Responses to Learnin}      Endings: {[facility] Endings:38408}    Modes of Intervention: [address] [facility] Modes of Intervention:373585904}      Discipline Responsible: {[facility] Multidisciplinary:259318907}      Signature:  Phil Cordova, CTRS

## 2022-11-06 NOTE — GROUP NOTE
Group Therapy Note    Date: 11/6/2022    Group Start Time: 3065  Group End Time: 0607  Group Topic: Cognitive Skills    [facility]    YOU Arzate LSW        Group Therapy Note    Attendees: 10       Patient's Goal:  Pt will be able to verbal understanding regarding the importance of self-care. Notes:  Pt made connections and participated in group. Status After Intervention:  Improved    Participation Level:  Active Listener and Interactive    Participation Quality: Appropriate, Attentive, Sharing, and Supportive      Speech:  normal      Thought Process/Content: Logical  Linear      Affective Functioning: Congruent      Mood: depressed      Level of consciousness:  Alert, Oriented x4, and Attentive      Response to Learning: Able to verbalize current knowledge/experience      Endings: None Reported    Modes of Intervention: Education, Support, Socialization, and Exploration      Discipline Responsible: /Counselor      Signature:  YOU Arzate LSW

## 2022-11-06 NOTE — CARE COORDINATION
JOHNIE spoke with pt's wife, Armin Hedrick [phone] (ALEXIS signed). Armin Hedrick states that she has no immediate concerns for this pt, and feels as though he is always somewhat depressed. Armin Prados states that this pt may also be in the early stages of dementia, but has not gone to a specialist to receive treatment. Armin Prados states that this pt will return home with her at discharge. Maureen's sister will provide transportation. No access to weapons per Armin Hedrick. JOHNIE will continue to assist as needed.

## 2022-11-06 NOTE — PLAN OF CARE
Problem: Anxiety  Goal: Will report anxiety at manageable levels  Description: INTERVENTIONS:  1. Administer medication as ordered  2. Teach and rehearse alternative coping skills  3. Provide emotional support with 1:1 interaction with staff  Outcome: Progressing     Problem: Coping  Goal: Pt/Family able to verbalize concerns and demonstrate effective coping strategies  Description: INTERVENTIONS:  1. Assist patient/family to identify coping skills, available support systems and cultural and spiritual values  2. Provide emotional support, including active listening and acknowledgement of concerns of patient and caregivers  3. Reduce environmental stimuli, as able  4. Instruct patient/family in relaxation techniques, as appropriate  5. Assess for spiritual pain/suffering and initiate Spiritual Care, Psychosocial Clinical Specialist consults as needed  Outcome: Progressing     Problem: Depression/Self Harm  Goal: Effect of psychiatric condition will be minimized and patient will be protected from self harm  Description: INTERVENTIONS:  1. Assess impact of patient's symptoms on level of functioning, self care needs and offer support as indicated  2. Assess patient/family knowledge of depression, impact on illness and need for teaching  3. Provide emotional support, presence and reassurance  4. Assess for possible suicidal thoughts or ideation. If patient expresses suicidal thoughts or statements do not leave alone, initiate Suicide Precautions, move to a room close to the nursing station and obtain sitter  5. Initiate consults as appropriate with Mental Health Professional, Spiritual Care, Psychosocial CNS, and consider a recommendation to the [facility] for a Psychiatric Consultation  Outcome: Progressing     Pt denies SI/HI and AVH. He does endorse some anxiety and depression and not really feeling much different than before admission. He is pleasant and cooperative. Med compliant.  Will continue to monitor and provide support.

## 2022-11-06 NOTE — PLAN OF CARE
Patient up and about the unit patient denies SI/HI AVH, rates anxiety 3/10 and depression 2/10. Patient is flat but brightens with conversation, friendly and cooperative. Patient compliant with medications and groups. Will continue to monitor and assess q 15 min for safety throughout the shift. Problem: Anxiety  Goal: Will report anxiety at manageable levels  Description: INTERVENTIONS:  1. Administer medication as ordered  2. Teach and rehearse alternative coping skills  3. Provide emotional support with 1:1 interaction with staff  Outcome: Progressing     Problem: Coping  Goal: Pt/Family able to verbalize concerns and demonstrate effective coping strategies  Description: INTERVENTIONS:  1. Assist patient/family to identify coping skills, available support systems and cultural and spiritual values  2. Provide emotional support, including active listening and acknowledgement of concerns of patient and caregivers  3. Reduce environmental stimuli, as able  4. Instruct patient/family in relaxation techniques, as appropriate  5. Assess for spiritual pain/suffering and initiate Spiritual Care, Psychosocial Clinical Specialist consults as needed  Outcome: Progressing     Problem: Depression/Self Harm  Goal: Effect of psychiatric condition will be minimized and patient will be protected from self harm  Description: INTERVENTIONS:  1. Assess impact of patient's symptoms on level of functioning, self care needs and offer support as indicated  2. Assess patient/family knowledge of depression, impact on illness and need for teaching  3. Provide emotional support, presence and reassurance  4. Assess for possible suicidal thoughts or ideation. If patient expresses suicidal thoughts or statements do not leave alone, initiate Suicide Precautions, move to a room close to the nursing station and obtain sitter  5.  Initiate consults as appropriate with Mental Health Professional, Spiritual Care, Psychosocial CNS, and consider a recommendation to the [facility] for a Psychiatric Consultation  Outcome: Progressing     Problem: Involuntary Admit  Goal: Will cooperate with staff recommendations and doctor's orders and will demonstrate appropriate behavior  Description: INTERVENTIONS:  1. Treat underlying conditions and offer medication as ordered  2. Educate regarding involuntary admission procedures and rules  3.  Contain excessive/inappropriate behavior per unit and hospital policies  Outcome: Progressing

## 2022-11-06 NOTE — PROGRESS NOTES
BEHAVIORAL HEALTH FOLLOW-UP NOTE     11/6/2022     Patient was seen and examined in person, Chart reviewed   Patient's case discussed with staff/team    Chief Complaint: \"I am okay. \"    Interim History:     Patient seen in his room somewhat disheveled. His affect is flat and blunted. He denies current suicidal thoughts he states the medication is [place] [employer] [place]. \"  His MoCA score was a 25 out of 30 he denies suicidal or homicidal ideations intent or plan denies auditory or visual hallucinations very poor insight and judgment regarding circumstance of hospitalization need for treatment he offers little conversation he is encouraged to attend groups and socialize in the milieu        Appetite:   [x] Normal/Unchanged  [] Increased  [] Decreased      Sleep:       [x] Normal/Unchanged  [] Fair       [] Poor              Energy:    [x] Normal/Unchanged  [] Increased  [] Decreased        SI [] Present  [x] Absent    HI  []Present  [x] Absent     Aggression:  [] yes  [x] no    Patient is [x] able  [] unable to CONTRACT FOR SAFETY     PAST MEDICAL/PSYCHIATRIC HISTORY:   Past Medical History:   Diagnosis Date    Cancer ([facility]     Depression     Major depressive disorder, recurrent episode, severe ([facility] 9/14/2011    MD[facility]major depressive disorder), [facility]     Overactive bladder 9/14/2011    Prostate cancer ([facility]     Suicidal ideations     Urinary incontinence        FAMILY/SOCIAL HISTORY:  Family History   Problem Relation Age of Onset    Cancer Sister     Depression Sister     Cancer Brother     Depression Brother      Social History     Socioeconomic History    Marital status:      Spouse name: Not on file    Number of children: 0    Years of education: Not on file    Highest education level: Not on file   Occupational History    Not on file   Tobacco Use    Smoking status: Never    Smokeless tobacco: Never   Vaping Use    Vaping Use: Never used   Substance and Sexual Activity    Alcohol use: No     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  Explain positive(checked items) below:  [] Constitutional  [] Eyes  [] Ear/Nose/Mouth/Throat  [] Respiratory  [] CV  [] GI  []   [] Musculoskeletal  [] Skin/Breast  [] Neurological  [] Endocrine  [] Heme/Lymph  [] Allergic/Immunologic    Explanation:     MEDICATIONS:    Current Facility-Administered Medications:     levothyroxine (SYNTHROID) tablet 50 mcg, 50 mcg, Oral, Daily, RITA Jim CNP, 50 mcg at 11/05/22 1410    citalopram (CELEXA) tablet 10 mg, 10 mg, Oral, Daily, RITA Jim CNP, 10 mg at 11/05/22 1410    acetaminophen (TYLENOL) tablet 650 mg, 650 mg, Oral, H5T PRN, RITA Jim CNP    polyethylene glycol (GLYCOLAX) packet 17 g, 17 g, Oral, Daily PRN, RITA Jim CNP    haloperidol (HALDOL) tablet 5 mg, 5 mg, Oral, Q4H PRN **OR** haloperidol lactate (HALDOL) injection 5 mg, 5 mg, IntraMUSCular, T3G PRN, RITA Jim CNP    traZODone (DESYREL) tablet 50 mg, 50 mg, Oral, Nightly PRN, RITA Jim CNP      Examination:  /75   Pulse 86   Temp 98.3 °F (36.8 °C) (Temporal)   Resp 16   Ht 6' 2\" (1.88 m)   Wt 180 lb (81.6 kg)   SpO2 98%   BMI 23.11 kg/m²   Gait - steady  Medication side effects(SE): Denies    Mental Status Examination:    Level of consciousness:  within normal limits   Appearance: Disheveled  Behavior/Motor:  no abnormalities noted  Attitude toward examiner:  cooperative  Speech:  spontaneous, normal rate and normal volume   Mood: \" I am okay. \"  Affect: Blunted  Thought processes: Linear thought flight of ideas loose associations  Thought content: Devoid of any auditory visual hallucinations delusions or other perceptual normalities. Denies SI/HI intent or plan  Cognition:  oriented to person, place, and time   Concentration intact  Insight Limited  Judgement Limited      ASSESSMENT:   Patient symptoms are:  [] Well controlled  [] Improving  [] Worsening  [x] No change      Diagnosis:   Principal Problem:    MDD (major depressive disorder), recurrent severe, without psychosis ([facility]  Resolved Problems:    Hx of major depression      LABS:    Recent Labs     11/04/22  1142   WBC 6.1   HGB 14.7        Recent Labs     11/04/22  1142      K 4.1      CO2 26   BUN 10   CREATININE 1.2   GLUCOSE 87     Recent Labs     11/04/22  1142   BILITOT 0.4   ALKPHOS 108   AST 17   ALT 9     Lab Results   Component Value Date/Time    LABAMPH NOT DETECTED 11/04/2022 11:42 AM    LABAMPH NOT DETECTED 09/13/2011 09:10 PM    BARBSCNU NOT DETECTED 11/04/2022 11:42 AM    LABBENZ NOT DETECTED 11/04/2022 11:42 AM    LABBENZ NOT DETECTED 09/13/2011 09:10 PM    CANNAB NOT DETECTED 09/13/2011 09:10 PM    LABMETH NOT DETECTED 11/04/2022 11:42 AM    OPIATESCREENURINE NOT DETECTED 11/04/2022 11:42 AM    PHENCYCLIDINESCREENURINE NOT DETECTED 11/04/2022 11:42 AM    ETOH <10 11/04/2022 11:42 AM     Lab Results   Component Value Date/Time    TSH 2.790 03/28/2020 02:25 PM     Lab Results   Component Value Date    LITHIUM 0.13 (L) 08/10/2018     No results found for: VALPROATE, CBMZ        Treatment Plan:  Reviewed current Medications with the patient. Risks, benefits, side effects, drug-to-drug interactions and alternatives to treatment were discussed. Collateral information:   CD evaluation  Encourage patient to attend group and other milieu activities.   Discharge planning discussed with the patient and treatment team.    Continue Celexa 10 mg daily    PSYCHOTHERAPY/COUNSELING:  [x] Therapeutic interview  [x] Supportive  [] CBT  [] Ongoing  [] Other    [x] Patient continues to need, on a daily basis, active treatment furnished directly by or requiring the supervision of inpatient psychiatric personnel      Anticipated Length of stay: 3 to 7 days based on stability            Electronically signed by RITA Plunkett CNP on 12/4/6203 at 8:40 AM

## 2022-11-07 PROCEDURE — 6370000000 HC RX 637 (ALT 250 FOR IP): Performed by: NURSE PRACTITIONER

## 2022-11-07 PROCEDURE — 1240000000 HC EMOTIONAL WELLNESS R&B

## 2022-11-07 PROCEDURE — 99232 SBSQ HOSP IP/OBS MODERATE 35: CPT | Performed by: NURSE PRACTITIONER

## 2022-11-07 RX ADMIN — CITALOPRAM HYDROBROMIDE 10 MG: 20 TABLET ORAL at 09:50

## 2022-11-07 RX ADMIN — LEVOTHYROXINE SODIUM 50 MCG: 0.05 TABLET ORAL at 09:50

## 2022-11-07 NOTE — PLAN OF CARE
Patient up and about the unit patient denies SI/HI AVH, anxiety and depression. Patient is flat but brightens with conversation, friendly and cooperative. Patient compliant with medications and groups. Will continue to monitor and assess q 15 min for safety throughout the shift. Problem: Anxiety  Goal: Will report anxiety at manageable levels  Description: INTERVENTIONS:  1. Administer medication as ordered  2. Teach and rehearse alternative coping skills  3. Provide emotional support with 1:1 interaction with staff  Outcome: Progressing     Problem: Coping  Goal: Pt/Family able to verbalize concerns and demonstrate effective coping strategies  Description: INTERVENTIONS:  1. Assist patient/family to identify coping skills, available support systems and cultural and spiritual values  2. Provide emotional support, including active listening and acknowledgement of concerns of patient and caregivers  3. Reduce environmental stimuli, as able  4. Instruct patient/family in relaxation techniques, as appropriate  5. Assess for spiritual pain/suffering and initiate Spiritual Care, Psychosocial Clinical Specialist consults as needed  Outcome: Progressing     Problem: Depression/Self Harm  Goal: Effect of psychiatric condition will be minimized and patient will be protected from self harm  Description: INTERVENTIONS:  1. Assess impact of patient's symptoms on level of functioning, self care needs and offer support as indicated  2. Assess patient/family knowledge of depression, impact on illness and need for teaching  3. Provide emotional support, presence and reassurance  4. Assess for possible suicidal thoughts or ideation. If patient expresses suicidal thoughts or statements do not leave alone, initiate Suicide Precautions, move to a room close to the nursing station and obtain sitter  5.  Initiate consults as appropriate with Mental Health Professional, Spiritual Care, Psychosocial CNS, and consider a recommendation to the [facility] for a Psychiatric Consultation  Outcome: Progressing     Problem: Involuntary Admit  Goal: Will cooperate with staff recommendations and doctor's orders and will demonstrate appropriate behavior  Description: INTERVENTIONS:  1. Treat underlying conditions and offer medication as ordered  2. Educate regarding involuntary admission procedures and rules  3.  Contain excessive/inappropriate behavior per unit and hospital policies  Outcome: Progressing

## 2022-11-07 NOTE — PROGRESS NOTES
BEHAVIORAL HEALTH FOLLOW-UP NOTE     11/7/2022     Patient was seen and examined in person, Chart reviewed   Patient's case discussed with staff/team    Chief Complaint: \"I am doing better\"     Interim History:     Patient is up in the dayroom, he is brighter today, denies all. States that he has slept really good since he has been here and feels like the medications are helping him. His affect is a little blunted however he brightens with conversation. His MoCA score was a 25 out of 30 he denies suicidal or homicidal ideations intent or plan denies auditory or visual hallucinations very poor insight and judgment regarding circumstance of hospitalization need for treatment. Tells me he plans to return home with his wife on discharge and that she is supportive of him. He is out on the unit, attending groups.          Appetite:   [x] Normal/Unchanged  [] Increased  [] Decreased      Sleep:       [x] Normal/Unchanged  [] Fair       [] Poor              Energy:    [x] Normal/Unchanged  [] Increased  [] Decreased        SI [] Present  [x] Absent    HI  []Present  [x] Absent     Aggression:  [] yes  [x] no    Patient is [x] able  [] unable to CONTRACT FOR SAFETY     PAST MEDICAL/PSYCHIATRIC HISTORY:   Past Medical History:   Diagnosis Date    [facility]     Depression     Major depressive disorder, recurrent episode, severe ([facility] 9/14/2011    MDD (major depressive disorder), recurrent severe, without psychosis ([facility]     Overactive bladder 9/14/2011    Prostate cancer ([facility]     Suicidal ideations     Urinary incontinence        FAMILY/SOCIAL HISTORY:  Family History   Problem Relation Age of Onset    Cancer Sister     Depression Sister     Cancer Brother     Depression Brother      Social History     Socioeconomic History    Marital status:      Spouse name: Not on file    Number of children: 0    Years of education: Not on file    Highest education level: Not on file   Occupational History    Not on file   Tobacco Use    Smoking status: Never    Smokeless tobacco: Never   Vaping Use    Vaping Use: Never used   Substance and Sexual Activity    Alcohol use: No     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  Explain positive(checked items) below:  [] Constitutional  [] Eyes  [] Ear/Nose/Mouth/Throat  [] Respiratory  [] CV  [] GI  []   [] Musculoskeletal  [] Skin/Breast  [] Neurological  [] Endocrine  [] Heme/Lymph  [] Allergic/Immunologic    Explanation:     MEDICATIONS:    Current Facility-Administered Medications:     levothyroxine (SYNTHROID) tablet 50 mcg, 50 mcg, Oral, Daily, Ginna Heath APRN - CNP, 50 mcg at 11/06/22 0940    citalopram (CELEXA) tablet 10 mg, 10 mg, Oral, Daily, Sundra Manger, APRN - CNP, 10 mg at 11/06/22 0940    acetaminophen (TYLENOL) tablet 650 mg, 650 mg, Oral, E7L PRN, Ginna Heath APRN - CNP    polyethylene glycol (GLYCOLAX) packet 17 g, 17 g, Oral, Daily PRN, Ginna Heath APRN - CNP    haloperidol (HALDOL) tablet 5 mg, 5 mg, Oral, Q4H PRN **OR** haloperidol lactate (HALDOL) injection 5 mg, 5 mg, IntraMUSCular, T3S PRN, Ginna Heath APRN - CNP    traZODone (DESYREL) tablet 50 mg, 50 mg, Oral, Nightly PRN, Ginna Heath APRN - CNP      Examination:  /86   Pulse 81   Temp 97.5 °F (36.4 °C) (Temporal)   Resp 16   Ht 6' 2\" (1.88 m)   Wt 180 lb (81.6 kg)   SpO2 98%   BMI 23.11 kg/m²   Gait - steady  Medication side effects(SE): Denies    Mental Status Examination:    Level of consciousness: within normal limits   Appearance: Disheveled  Behavior/Motor:  no abnormalities noted  Attitude toward examiner:  cooperative  Speech:  spontaneous, normal rate and normal volume   Mood: \" I am okay. \"  Affect: Blunted  Thought processes: Linear thought flight of ideas loose associations  Thought content: Devoid of any auditory visual hallucinations delusions or other perceptual normalities. Denies SI/HI intent or plan  Cognition:  oriented to person, place, and time   Concentration intact  Insight Limited  Judgement Limited      ASSESSMENT:   Patient symptoms are:  [] Well controlled  [] Improving  [] Worsening  [x] No change      Diagnosis:   Principal Problem:    MDD (major depressive disorder), recurrent severe, without psychosis ([facility]  Resolved Problems:    Hx of major depression      LABS:    Recent Labs     11/04/22  1142   WBC 6.1   HGB 14.7        Recent Labs     11/04/22  1142      K 4.1      CO2 26   BUN 10   CREATININE 1.2   GLUCOSE 87     Recent Labs     11/04/22  1142   BILITOT 0.4   ALKPHOS 108   AST 17   ALT 9     Lab Results   Component Value Date/Time    LABAMPH NOT DETECTED 11/04/2022 11:42 AM    LABAMPH NOT DETECTED 09/13/2011 09:10 PM    BARBSCNU NOT DETECTED 11/04/2022 11:42 AM    LABBENZ NOT DETECTED 11/04/2022 11:42 AM    LABBENZ NOT DETECTED 09/13/2011 09:10 PM    CANNAB NOT DETECTED 09/13/2011 09:10 PM    LABMETH NOT DETECTED 11/04/2022 11:42 AM    OPIATESCREENURINE NOT DETECTED 11/04/2022 11:42 AM    PHENCYCLIDINESCREENURINE NOT DETECTED 11/04/2022 11:42 AM    ETOH <10 11/04/2022 11:42 AM     Lab Results   Component Value Date/Time    TSH 2.790 03/28/2020 02:25 PM     Lab Results   Component Value Date    LITHIUM 0.13 (L) 08/10/2018     No results found for: VALPROATE, CBMZ        Treatment Plan:  Reviewed current Medications with the patient. Risks, benefits, side effects, drug-to-drug interactions and alternatives to treatment were discussed.   Collateral information:   CD evaluation  Encourage patient to attend group and other milieu activities.   Discharge planning discussed with the patient and treatment team.    Continue Celexa 10 mg daily    PSYCHOTHERAPY/COUNSELING:  [x] Therapeutic interview  [x] Supportive  [] CBT  [] Ongoing  [] Other    [x] Patient continues to need, on a daily basis, active treatment furnished directly by or requiring the supervision of inpatient psychiatric personnel      Anticipated Length of stay: 3 to 7 days based on stability            Electronically signed by RITA Pratt CNP on 11/7/2022 at 8:32 AM

## 2022-11-07 NOTE — PLAN OF CARE
Problem: Anxiety  Goal: Will report anxiety at manageable levels  Description: INTERVENTIONS:  1. Administer medication as ordered  2. Teach and rehearse alternative coping skills  3. Provide emotional support with 1:1 interaction with staff  Outcome: Progressing     Problem: Coping  Goal: Pt/Family able to verbalize concerns and demonstrate effective coping strategies  Description: INTERVENTIONS:  1. Assist patient/family to identify coping skills, available support systems and cultural and spiritual values  2. Provide emotional support, including active listening and acknowledgement of concerns of patient and caregivers  3. Reduce environmental stimuli, as able  4. Instruct patient/family in relaxation techniques, as appropriate  5. Assess for spiritual pain/suffering and initiate Spiritual Care, Psychosocial Clinical Specialist consults as needed  Outcome: Progressing     Problem: Depression/Self Harm  Goal: Effect of psychiatric condition will be minimized and patient will be protected from self harm  Description: INTERVENTIONS:  1. Assess impact of patient's symptoms on level of functioning, self care needs and offer support as indicated  2. Assess patient/family knowledge of depression, impact on illness and need for teaching  3. Provide emotional support, presence and reassurance  4. Assess for possible suicidal thoughts or ideation. If patient expresses suicidal thoughts or statements do not leave alone, initiate Suicide Precautions, move to a room close to the nursing station and obtain sitter  5. Initiate consults as appropriate with Mental Health Professional, Spiritual Care, Psychosocial CNS, and consider a recommendation to the [facility]IP for a Psychiatric Consultation  Outcome: Progressing     Pt denies SI/HI and AVH. He reports mild depression and anxiety. He is pleasant and cooperative. Med compliant and attends groups. Will continue to monitor and provide support.

## 2022-11-07 NOTE — CARE COORDINATION
JOHNIE called the [facility]  at extension [address] to schedule follow up appointments. JOHNIE spoke with Chanda Cabot and scheduled a medication management appointment with psychiatrist 11/18 at 10 AM and an appointment with Dr. Elza Burton for counseling. [place] [address]    Phone: [phone]   Fax: [phone] attn.  Mental health

## 2022-11-07 NOTE — PLAN OF CARE
Attended / particip well in today's 1 h group on home improvement and problem solving and sensitivity. Attentive to a/v materials used / presented. Scored 100% on written project and did excellent presentation to the group. Appeared to digest key learning points.

## 2022-11-07 NOTE — PROGRESS NOTES
[facility]  Day 3 Interdisciplinary Treatment Plan NOTE    Review Date & Time: 11-7-22 0900    Patient was in treatment team    Estimated Length of Stay Update:  3-5 days  Estimated Discharge Date Update: 11-10-22    EDUCATION:   Learner Progress Toward Treatment Goals: Reviewed goals and plan of care    Method: Small group    Outcome: Verbalized understanding    PATIENT GOALS: \"Keep improving my depression\"    PLAN/TREATMENT RECOMMENDATIONS UPDATE:Encourage group participation and medication compliance. Continue to monitor and provide emotional support.     GOALS UPDATE:   Time frame for Short-Term Goals: 1-3 days      Marcos Hampton RN

## 2022-11-07 NOTE — GROUP NOTE
Group Therapy Note    Date: 11/7/2022    Group Start Time: 1100  Group End Time: 1150  Group Topic: Psychotherapy    [facility] 7W ACUTE BH 2    YOU Pino, [facility]        Group Therapy Note    Attendees: 9       Patient's Goal:  To increase social interaction and improve relationships with others. Notes: Pt was attentive in group and was able to identify an agenda. Pt was also able to verbalize relating to others within the group. Status After Intervention:  Improved    Participation Level:  Active Listener and Interactive    Participation Quality: Appropriate, Attentive, and Sharing      Speech:  normal      Thought Process/Content: Logical      Affective Functioning: Congruent      Mood: anxious      Level of consciousness:  Alert, Oriented x4, and Attentive      Response to Learning: Able to verbalize current knowledge/experience, Able to verbalize/acknowledge new learning, Able to retain information, and Capable of insight      Endings: None Reported    Modes of Intervention: Support, Socialization, and Exploration      Discipline Responsible: /Counselor      Signature:  YOU Pino [place]

## 2022-11-07 NOTE — GROUP NOTE
Group Therapy Note    Date: 11/7/2022    Group Start Time: 9426  Group End Time: 0335  Group Topic: Psychoeducation    [facility] 7SE ACUTE BH 1    Dion Chapp[place], [place]                                                                        Group Therapy Note    Date: 11/7/2022    Type of Group: Psychoeducation    Wellness Binder Information  Module Name:  communication active listening and soft communication     Patient's Goal:  Patient will be able to id specific soft communication openers. Notes:  Pleasant and sharing in group. Able to share what has help them thru life to communicate with others. Status After Intervention:  Improved    Participation Level:  Active Listener and Interactive    Participation Quality: Appropriate, Attentive, Sharing, and Supportive      Speech:  normal      Thought Process/Content: Logical  Linear      Affective Functioning: Congruent      Mood: euthymic      Level of consciousness:  Alert, Oriented x4, and Attentive      Response to Learning: Able to verbalize/acknowledge new learning, Able to retain information, and Progressing to goal      Endings: None Reported    Modes of Intervention: Education, Support, Socialization, Exploration, and Problem-solving      Discipline Responsible: Psychoeducational Specialist      Signature:  Brooke Talbot

## 2022-11-08 PROCEDURE — 6370000000 HC RX 637 (ALT 250 FOR IP): Performed by: NURSE PRACTITIONER

## 2022-11-08 PROCEDURE — 99232 SBSQ HOSP IP/OBS MODERATE 35: CPT | Performed by: NURSE PRACTITIONER

## 2022-11-08 PROCEDURE — 1240000000 HC EMOTIONAL WELLNESS R&B

## 2022-11-08 RX ORDER — CITALOPRAM 10 MG/1
10 TABLET ORAL DAILY
Qty: 30 TABLET | Refills: 0 | Status: SHIPPED | OUTPATIENT
Start: 2022-11-09 | End: 2022-12-09

## 2022-11-08 RX ADMIN — CITALOPRAM HYDROBROMIDE 10 MG: 20 TABLET ORAL at 10:05

## 2022-11-08 RX ADMIN — LEVOTHYROXINE SODIUM 50 MCG: 0.05 TABLET ORAL at 10:05

## 2022-11-08 NOTE — GROUP NOTE
Group Therapy Note    Date: 11/8/2022    Group Start Time: 4136  Group End Time: 5784  Group Topic: Cognitive Skills    [facility] 7SE ACUTE BH 1    YOU Nesbitt LSW        Group Therapy Note    Attendees: 8       Patient's Goal:  Pt will be able to verbalize understanding regarding the importance of apologizing. Notes:  Pt made connections and participated in group. Status After Intervention:  Improved    Participation Level:  Active Listener and Interactive    Participation Quality: Appropriate, Attentive, Sharing, and Supportive      Speech:  normal      Thought Process/Content: Logical  Linear      Affective Functioning: Congruent      Mood: euthymic       Level of consciousness:  Alert, Oriented x4, and Attentive      Response to Learning: Able to verbalize current knowledge/experience      Endings: None Reported    Modes of Intervention: Education, Support, Socialization, and Exploration      Discipline Responsible: /Counselor      Signature:  YOU Nesbitt LSW

## 2022-11-08 NOTE — PLAN OF CARE
to the [facility] for a Psychiatric Consultation  Outcome: Progressing     Problem: Involuntary Admit  Goal: Will cooperate with staff recommendations and doctor's orders and will demonstrate appropriate behavior  Description: INTERVENTIONS:  1. Treat underlying conditions and offer medication as ordered  2. Educate regarding involuntary admission procedures and rules  3.  Contain excessive/inappropriate behavior per unit and hospital policies  Outcome: Progressing

## 2022-11-08 NOTE — CARE COORDINATION
SW met with this pt for a daily check in. Pt observed sitting in the dinning room, eating breakfast. Pt states that he is doing well today, and is looking forward to returning home with his wife at discharge. Pt is pleasant and cooperative with this . Pt's eye-contact is good. Pt appears linear and logical. Pt is currently denying SI/ HI/ hallucinations/ delusions. Pt will return home with his wife at discharge. Pt's wife's sister to provide transportation. Collateral gained from pt's wife solidifying this discharge plan. Pt will continue to follow up outpatient with the [facility]. SW will continue to monitor this pt's moods and behaviors.

## 2022-11-08 NOTE — PLAN OF CARE
Problem: Anxiety  Goal: Will report anxiety at manageable levels  Description: INTERVENTIONS:  1. Administer medication as ordered  2. Teach and rehearse alternative coping skills  3. Provide emotional support with 1:1 interaction with staff  Outcome: Progressing     Problem: Coping  Goal: Pt/Family able to verbalize concerns and demonstrate effective coping strategies  Description: INTERVENTIONS:  1. Assist patient/family to identify coping skills, available support systems and cultural and spiritual values  2. Provide emotional support, including active listening and acknowledgement of concerns of patient and caregivers  3. Reduce environmental stimuli, as able  4. Instruct patient/family in relaxation techniques, as appropriate  5. Assess for spiritual pain/suffering and initiate Spiritual Care, Psychosocial Clinical Specialist consults as needed  Outcome: Progressing     Problem: Depression/Self Harm  Goal: Effect of psychiatric condition will be minimized and patient will be protected from self harm  Description: INTERVENTIONS:  1. Assess impact of patient's symptoms on level of functioning, self care needs and offer support as indicated  2. Assess patient/family knowledge of depression, impact on illness and need for teaching  3. Provide emotional support, presence and reassurance  4. Assess for possible suicidal thoughts or ideation. If patient expresses suicidal thoughts or statements do not leave alone, initiate Suicide Precautions, move to a room close to the nursing station and obtain sitter  5. Initiate consults as appropriate with Mental Health Professional, Spiritual Care, Psychosocial CNS, and consider a recommendation to the [facility]IP for a Psychiatric Consultation  Outcome: Progressing     Pt denies SI/HI and AVH. He denies depression and anxiety although his affect is flat but brightens with conversation. He stays to himself but but does enjoy conversing when spoken too. He is pleasant and cooperative. Med compliant. Attends groups. Will continue to monitor and provide support.

## 2022-11-08 NOTE — PROGRESS NOTES
[facility] NOTE     11/8/2022     Patient was seen and examined in person, Chart reviewed   Patient's case discussed with staff/team    Chief Complaint: blunted, denies suicidal ideation     Interim History:     Patient is up in the dayroom, he is brighter today, denies all, visible out in the milieu. States that he has slept really good since he has been here and feels like the medications are helping him. His affect is a little blunted however he brightens with conversation. Denies any suicidal ideations. Will refer to [address]. Tells me he plans to return home with his wife on discharge and that she is supportive of him. He is out on the unit, attending groups.          Appetite:   [x] Normal/Unchanged  [] Increased  [] Decreased      Sleep:       [x] Normal/Unchanged  [] Fair       [] Poor              Energy:    [x] Normal/Unchanged  [] Increased  [] Decreased        SI [] Present  [x] Absent    HI  []Present  [x] Absent     Aggression:  [] yes  [x] no    Patient is [x] able  [] unable to CONTRACT FOR SAFETY     PAST MEDICAL/PSYCHIATRIC HISTORY:   Past Medical History:   Diagnosis Date    Cancer ([facility]     Depression     Major depressive disorder, recurrent episode, severe ([facility] 9/14/2011    MDD (major depressive disorder), recurrent severe, without psychosis ([facility]     Overactive bladder 9/14/2011    Prostate cancer ([facility]     Suicidal ideations     Urinary incontinence        FAMILY/SOCIAL HISTORY:  Family History   Problem Relation Age of Onset    Cancer Sister     Depression Sister     Cancer Brother     Depression Brother      Social History     Socioeconomic History    Marital status:      Spouse name: Not on file    Number of children: 0    Years of education: Not on file    Highest education level: Not on file   Occupational History    Not on file   Tobacco Use    Smoking status: Never    Smokeless tobacco: Never   Vaping Use    Vaping Use: Never used   Substance and Sexual Activity Alcohol use: No     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  Explain positive(checked items) below:  [] Constitutional  [] Eyes  [] Ear/Nose/Mouth/Throat  [] Respiratory  [] CV  [] GI  []   [] Musculoskeletal  [] Skin/Breast  [] Neurological  [] Endocrine  [] Heme/Lymph  [] Allergic/Immunologic    Explanation:     MEDICATIONS:    Current Facility-Administered Medications:     levothyroxine (SYNTHROID) tablet 50 mcg, 50 mcg, Oral, Daily, Eston Beck Dellick, APRN - CNP, 50 mcg at 11/07/22 0950    citalopram (CELEXA) tablet 10 mg, 10 mg, Oral, Daily, Alvena Seek, APRN - CNP, 10 mg at 11/07/22 0950    acetaminophen (TYLENOL) tablet 650 mg, 650 mg, Oral, X4V PRN, Tonny Ramoslick, APRN - CNP    polyethylene glycol (GLYCOLAX) packet 17 g, 17 g, Oral, Daily PRN, Eston Beck Dellick, APRN - CNP    haloperidol (HALDOL) tablet 5 mg, 5 mg, Oral, Q4H PRN **OR** haloperidol lactate (HALDOL) injection 5 mg, 5 mg, IntraMUSCular, N0U PRN, Estedgardo Caputok, APRN - CNP    traZODone (DESYREL) tablet 50 mg, 50 mg, Oral, Nightly PRN, Eston Beck Dellick, APRN - CNP      Examination:  /86   Pulse 94   Temp 98.1 °F (36.7 °C) (Temporal)   Resp 16   Ht 6' 2\" (1.88 m)   Wt 180 lb (81.6 kg)   SpO2 100%   BMI 23.11 kg/m²   Gait - steady  Medication side effects(SE): Denies    Mental Status Examination:    Level of consciousness:  within normal limits   Appearance: Disheveled  Behavior/Motor:  no abnormalities noted  Attitude toward examiner:  cooperative  Speech: spontaneous, normal rate and normal volume   Mood: \" I am okay. \"  Affect: Blunted  Thought processes: Linear thought flight of ideas loose associations  Thought content: Devoid of any auditory visual hallucinations delusions or other perceptual normalities. Denies SI/HI intent or plan  Cognition:  oriented to person, place, and time   Concentration intact  Insight Limited  Judgement Limited      ASSESSMENT:   Patient symptoms are:  [] Well controlled  [] Improving  [] Worsening  [x] No change      Diagnosis:   Principal Problem:    MDD (major depressive disorder), recurrent severe, without psychosis ([facility]  Resolved Problems:    Hx of major depression      LABS:    No results for input(s): WBC, HGB, PLT in the last 72 hours. No results for input(s): NA, K, CL, CO2, BUN, CREATININE, GLUCOSE in the last 72 hours. No results for input(s): BILITOT, ALKPHOS, AST, ALT in the last 72 hours. Lab Results   Component Value Date/Time    LABAMPH NOT DETECTED 11/04/2022 11:42 AM    LABAMPH NOT DETECTED 09/13/2011 09:10 PM    BARBSCNU NOT DETECTED 11/04/2022 11:42 AM    LABBENZ NOT DETECTED 11/04/2022 11:42 AM    LABBENZ NOT DETECTED 09/13/2011 09:10 PM    CANNAB NOT DETECTED 09/13/2011 09:10 PM    LABMETH NOT DETECTED 11/04/2022 11:42 AM    OPIATESCREENURINE NOT DETECTED 11/04/2022 11:42 AM    PHENCYCLIDINESCREENURINE NOT DETECTED 11/04/2022 11:42 AM    ETOH <10 11/04/2022 11:42 AM     Lab Results   Component Value Date/Time    TSH 2.790 03/28/2020 02:25 PM     Lab Results   Component Value Date    LITHIUM 0.13 (L) 08/10/2018     No results found for: VALPROATE, CBMZ        Treatment Plan:  Reviewed current Medications with the patient. Risks, benefits, side effects, drug-to-drug interactions and alternatives to treatment were discussed. Collateral information:   CD evaluation  Encourage patient to attend group and other milieu activities.   Discharge planning discussed with the patient and treatment team.  Refer to TMS   Continue Celexa 10 mg daily    PSYCHOTHERAPY/COUNSELING:  [x] Therapeutic interview  [x] Supportive  [] CBT  [] Ongoing  [] Other    [x] Patient continues to need, on a daily basis, active treatment furnished directly by or requiring the supervision of inpatient psychiatric personnel      Anticipated Length of stay: 3 to 7 days based on stability            Electronically signed by RITA Mueller CNP on 11/8/2022 at 9:38 AM

## 2022-11-09 VITALS
HEART RATE: 101 BPM | OXYGEN SATURATION: 99 % | DIASTOLIC BLOOD PRESSURE: 95 MMHG | HEIGHT: 74 IN | RESPIRATION RATE: 15 BRPM | WEIGHT: 180 LBS | TEMPERATURE: 97.8 F | SYSTOLIC BLOOD PRESSURE: 145 MMHG | BODY MASS INDEX: 23.1 KG/M2

## 2022-11-09 PROCEDURE — 6370000000 HC RX 637 (ALT 250 FOR IP): Performed by: NURSE PRACTITIONER

## 2022-11-09 PROCEDURE — 99239 HOSP IP/OBS DSCHRG MGMT >30: CPT | Performed by: NURSE PRACTITIONER

## 2022-11-09 RX ADMIN — CITALOPRAM HYDROBROMIDE 10 MG: 20 TABLET ORAL at 08:45

## 2022-11-09 RX ADMIN — LEVOTHYROXINE SODIUM 50 MCG: 0.05 TABLET ORAL at 08:45

## 2022-11-09 NOTE — DISCHARGE SUMMARY
DISCHARGE SUMMARY      Patient ID:  Ren Owens  [id number]  89 y.o.  1950    Admit date: 11/4/2022    Discharge date and time: 11/9/2022    Admitting Physician: Hailey Arellano MD     Discharge Physician: Dr Nancy Root MD    Discharge Diagnoses:   Patient Active Problem List   Diagnosis    Hypertriglyceridemia    Recurrent major depressive disorder (HCC)    MDD (major depressive disorder), recurrent severe, without psychosis ([facility]    Suicidal ideation    Mixed hyperlipidemia    Suicide attempt by substance overdose ([facility]    Metabolic acidosis due to ethylene glycol    Ethylene glycol poisoning    Drug overdoses, intentional self-harm, initial encounter ([facility]    DESIREE (acute kidney injury) ([facility]    Chronic fatigue    Vitamin D deficiency    Hypothyroidism       Admission Condition: poor    Discharged Condition: stable    Admission Circumstance:   [facility] reported that he has been contemplating suicide with a plan to shoot himself but states he has no gun or overdosing but states wife has locked up his medications      PAST MEDICAL/PSYCHIATRIC HISTORY:   Past Medical History:   Diagnosis Date    Cancer ([facility]     Depression     Major depressive disorder, recurrent episode, severe ([facility] 9/14/2011    MDD (major depressive disorder), recurrent severe, without psychosis ([facility]     Overactive bladder 9/14/2011    Prostate cancer ([facility]     Suicidal ideations     Urinary incontinence        FAMILY/SOCIAL HISTORY:  Family History   Problem Relation Age of Onset    Cancer Sister     Depression Sister     Cancer Brother     Depression Brother      Social History     Socioeconomic History    Marital status:      Spouse name: Not on file    Number of children: 0    Years of education: Not on file    Highest education level: Not on file   Occupational History    Not on file   Tobacco Use    Smoking status: Never    Smokeless tobacco: Never   Vaping Use    Vaping Use: Never used   Substance and Sexual Activity    Alcohol use: No     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 Determinants of Health     Financial Resource Strain: Not on file   Food Insecurity: Not on file   Transportation Needs: Not on file   Physical Activity: Not on file   Stress: Not on file   Social Connections: Not on file   Intimate Partner Violence: Not on file   Housing Stability: Not on file       MEDICATIONS:    Current Facility-Administered Medications:     levothyroxine (SYNTHROID) tablet 50 mcg, 50 mcg, Oral, Daily, Fernanda Nails Dellick, APRN - CNP, 50 mcg at 11/09/22 0845    citalopram (CELEXA) tablet 10 mg, 10 mg, Oral, Daily, Gerldine Randy, APRN - CNP, 10 mg at 11/09/22 0845    acetaminophen (TYLENOL) tablet 650 mg, 650 mg, Oral, W9Q PRN, Fernanda Nails Dellick, APRN - CNP    polyethylene glycol (GLYCOLAX) packet 17 g, 17 g, Oral, Daily PRN, Fernanda Nails Dellick, APRN - CNP    haloperidol (HALDOL) tablet 5 mg, 5 mg, Oral, Q4H PRN **OR** haloperidol lactate (HALDOL) injection 5 mg, 5 mg, IntraMUSCular, H3J PRN, Fernanda Nails Dellick, APRN - CNP    traZODone (DESYREL) tablet 50 mg, 50 mg, Oral, Nightly PRN, Fernanda Nails Dellick, APRN - CNP    Examination:  BP (!) 145/95   Pulse (!) 101   Temp 97.8 °F (36.6 °C) (Temporal)   Resp 15   Ht 6' 2\" (1.88 m)   Wt 180 lb (81.6 kg)   SpO2 99%   BMI 23.11 kg/m²   Gait - steady    HOSPITAL COURSE[de-identified]  Following admission to the hospital, patient had a complete physical exam and blood work up, which he was medically cleared and admitted to [facility] for psychiatric evaluation and stabilization. The patient was monitored closely with suicide and appropriate precautions. He was started on Celexa 10 mg daily referred for [facility]. He was encouraged to participate in group and other milieu activity and started to feel better with this combination of treatment.   There has been significant progress in the improvement of symptoms since admission. The patient has been an active participant in his treatment, and discharge The patient has been an active participant in his treatment, and discharge planning. The patient was able to spontaneously describe with richness of detail their future plans and goals. Patient was no longer suicidal, homicidal, manic or psychotic. He received the required treatment with medication, participated in group milieu, remained engaged in unit activities, learned appropriate coping skills. He was seen to be watching television socializing with peers using the phone. There were no mention or gestures of self-harm or harm to others. His mental status has returned to baseline. The treatment team believes the patient obtain maximum benefit out of this hospitalization and does not meet the criteria for inpatient hospitalization anymore. However he will continue to benefit from outpatient follow-up and treatment to maintain stability. Collateral information has been obtained and reconciled and there are no concerns about his safety. He has no access to guns or weapons. He appreciates the help that he received here. This patient no longer meets criteria for inpatient hospitalization. He was discharged home to his family in psychiatrically stable condition. No active SI/HI  Appetite:  [x] Normal  [] Increased  [] Decreased    Sleep:       [x] Normal  [] Fair       [] Poor            Energy:    [x] Normal  [] Increased  [] Decreased     SI [] Present  [x] Absent  HI  []Present  [x] Absent   Aggression:  [] yes  [x] no  Patient is [x] able  [] unable to CONTRACT FOR SAFETY   Medication side effects(SE):  [x] None(Psych.  Meds.) [] Other      Mental Status Examination on discharge:    Level of consciousness:  within normal limits   Appearance:  well-appearing  Behavior/Motor:  no abnormalities noted  Attitude toward examiner:  attentive and good eye contact  Speech:  spontaneous, normal rate and normal volume   Mood: euthymic  Affect:  mood congruent  Thought processes:  linear and goal directed   Thought content: The patient is devoid of suicidal or homicidal ideation intent or plan. Devoid of auditory or visual hallucinations or other perceptual disturbances, there are no overt or covert signs of psychosis or paranoia. There are no neurovegetative signs of depression. Cognition:  oriented to person, place, and time   Concentration intact  Memory intact  Insight good   Judgement fair   Fund of Knowledge adequate      ASSESSMENT:  Patient symptoms are:  [x] Well controlled  [x] Improving  [] Worsening  [] No change    Reason for more than one antipsychotic:  [x] N/A  [] 3 Failed Monotherapy attempts (Drugs tried:)  [] Crossover to a new antipsychotic  [] Taper to Monotherapy from Polypharmacy  [] Augmentation of clozapine therapy due to treatment resistance to single therapy    Diagnosis:  Principal Problem:    MDD (major depressive disorder), recurrent severe, without psychosis ([facility]  Resolved Problems:    Hx of major depression      LABS:    No results for input(s): WBC, HGB, PLT in the last 72 hours. No results for input(s): NA, K, CL, CO2, BUN, CREATININE, GLUCOSE in the last 72 hours. No results for input(s): BILITOT, ALKPHOS, AST, ALT in the last 72 hours.   Lab Results   Component Value Date/Time    LABAMPH NOT DETECTED 11/04/2022 11:42 AM    LABAMPH NOT DETECTED 09/13/2011 09:10 PM    BARBSCNU NOT DETECTED 11/04/2022 11:42 AM    LABBENZ NOT DETECTED 11/04/2022 11:42 AM    LABBENZ NOT DETECTED 09/13/2011 09:10 PM    CANNAB NOT DETECTED 09/13/2011 09:10 PM    LABMETH NOT DETECTED 11/04/2022 11:42 AM    OPIATESCREENURINE NOT DETECTED 11/04/2022 11:42 AM    PHENCYCLIDINESCREENURINE NOT DETECTED 11/04/2022 11:42 AM    ETOH <10 11/04/2022 11:42 AM     Lab Results   Component Value Date/Time    TSH 2.790 03/28/2020 02:25 PM     Lab Results   Component Value Date    LITHIUM 0.13 (L) 08/10/2018     No results found for: VALPROATE, CBMZ    RISK ASSESSMENT AT DISCHARGE: Low risk for suicide and homicide. Treatment Plan:  The patient's diagnosis, treatment plan, medication management were formulated after patient was seen directly by the attending physician and myself and all relevant documentation was reviewed. Weekend risk, benefit, side effects, possible outcomes of the medication and alternatives discussed with the patient and the patient demonstrated understanding. The patient was also educated that the outcome of treatment will depend on the medication compliance as directed by the prescribers along with regular follow-up, compliance with the labs and other work-up, as clinically indicated. Risks, benefits, side effects, drug-to-drug interactions and alternatives to treatment were discussed. Encourage patient to attend outpatient follow up appointment and therapy, outpatient follow-up appointment scheduled prior to discharge. Patient has been referred for [facility]    Patient was advised to call the outpatient provider, visit the nearest ED or call 911 if symptoms are not manageable. Patient's family member was contacted prior to the discharge, patient has been discharged home with his family in psychiatrically stable condition.          Medication List        START taking these medications      citalopram 10 MG tablet  Commonly known as: CELEXA  Take 1 tablet by mouth daily            CONTINUE taking these medications      donepezil 10 MG tablet  Commonly known as: ARICEPT  TAKE TWO TABLETS NIGHTLY     FOLIC ACID PO     levothyroxine 50 MCG tablet  Commonly known as: SYNTHROID  Take 1 tablet by mouth daily            STOP taking these medications      ARIPiprazole 2 MG tablet  Commonly known as: ABILIFY     atorvastatin 20 MG tablet  Commonly known as: LIPITOR     cyanocobalamin 1000 MCG tablet     cyanocobalamin 1000 MCG/ML injection     sildenafil 100 MG tablet  Commonly known as: VIAGRA     venlafaxine 75 MG extended release capsule  Commonly known as: EFFEXOR XR               Where to Get Your Medications        These medications were sent to [facility] [address]      Phone: [phone]   citalopram 10 MG tablet     NOTE: This report was transcribed using voice recognition software. Every effort was made to ensure accuracy; however, inadvertent computerized transcription errors may be present.       TIME SPEND - 35 MINUTES TO COMPLETE THE EVALUATION, DISCHARGE SUMMARY, MEDICATION RECONCILIATION AND FOLLOW UP CARE     Signed:  RITA Campbell CNP  11/9/2022  9:06 AM

## 2022-11-09 NOTE — CARE COORDINATION
SW met with this pt to discuss discharge. Pt observed walking down the hallway. Pt states that he is ready to be discharged today. No concerns voiced. Pt appeared flat, but brightened with conversation. Pt's eye-contact is good. Pt appears linear and logical. Pt is currently denying SI/ HI/ hallucinations/ delusions. Pt states that he will return home with his wife at discharge. Pt requesting he be able to walk to the [facility] [address] to  his car in the parking lot. Collateral gained from pt's wife. Pt will continue to follow up outpatient at the [address]. SW will continue to assist as needed.     In order to ensure appropriate transition and discharge planning is in place, the following documents have been transmitted to [place], as the new outpatient provider:    The d/c diagnosis was transmitted to the next care provider  The reason for hospitalization was transmitted to the next care provider  The d/c medications (dosage and indication) were transmitted to the next care provider   The continuing care plan was transmitted to the next care provider

## 2022-11-09 NOTE — PROGRESS NOTES
[facility]  Discharge Note    Pt discharged with followings belongings:   Dental Appliances: Uppers, Lowers  Vision - Corrective Lenses: Eyeglasses  Hearing Aid: Bilateral hearing aids  Jewelry: None  Body Piercings Removed: N/A  Clothing: Footwear, Shirt, Pants, Socks, Belt (wht florence, tan pant,sera sweater, belt, sera shoes,)  Other Valuables: Money, Wallet (brn wallet, $14)   Valuables sent home with patient or returned to patient. Patient educated on aftercare instructions: Yes  at 1:44 PM .Patient verbalize understanding of AVS:  Yes. Status EXAM upon discharge:  Mental Status and Behavioral Exam  Normal: No  Level of Assistance: Independent/Self  Facial Expression: Flat, Sad  Affect: Blunt  Level of Consciousness: Alert  Frequency of Checks: 4 times per hour, close  Mood:Normal: No  Mood: Anxious, Sad  Motor Activity:Normal: Yes  Eye Contact: Good  Observed Behavior: Cooperative  Sexual Misconduct History: Current - no  Involved In Any Sexual Misconduct With Others? : No  History of Sexually Inappropriate Behavior When Previously Hospitalized?: No  Uncontrollable/Compulsive Masturbation?: No  Difficulty Controlling Sexual Impulses?: No  Preception: [place] to person, [place] to time, [place] to place, [place] to situation  Attention:Normal: Yes  Thought Processes: Circumstantial  Thought Content:Normal: Yes  Thought Content: Preoccupations  Depression Symptoms: Isolative  Anxiety Symptoms: Generalized  Maribeth Symptoms: No problems reported or observed.   Hallucinations: None  Delusions: No  Memory:Normal: Yes  Insight and Judgment: Yes (Improving)  Insight and Judgment: Poor insight      Metabolic Screening:    No results found for: LABA1C    Lab Results   Component Value Date    CHOL 227 (H) 09/11/2017    CHOL 220 (H) 06/22/2017     Lab Results   Component Value Date    TRIG 118 09/11/2017    TRIG 204 (H) 06/22/2017     Lab Results   Component Value Date    HDL 40 09/11/2017    HDL 30 06/22/2017 No components found for: [facility]  Lab Results   Component Value Date    LABVLDL 24 09/11/2017    LABVLDL 41 06/22/2017       Yana Reid RN

## 2022-11-09 NOTE — GROUP NOTE
Group Therapy Note    Date: 11/9/2022    Group Start Time: 1100  Group End Time: 1125  Group Topic: Cognitive Skills    [facility] ACUTE [facility] 1    Lauri Cabot, MSW, RAJIW        Group Therapy Note    Attendees: 8       Patient's Goal:  Pt will be able to verbalize understanding regarding the importance of active listening. Notes:  Pt made connections and participated in group.     Status After Intervention:  Improved    Participation Level: Minimal    Participation Quality: Appropriate and Attentive      Speech:  normal      Thought Process/Content: Logical  Linear      Affective Functioning: Congruent      Mood: euthymic       Level of consciousness:  Alert, Oriented x4, and Attentive      Response to Learning: Able to verbalize current knowledge/experience      Endings: None Reported    Modes of Intervention: Education      Discipline Responsible: /Counselor      Signature:  Lauri Cabot, MSW, RONNI

## 2022-11-09 NOTE — PROGRESS NOTES
Patient up and about the unit patient denies SI/HI AVH, denies anxiety and depression. Patient is flat but brightens with conversation. Patient compliant with medications and groups and is discharged focused. Will continue to monitor and assess q 15 min for safety throughout the shift.

## 2022-11-09 NOTE — PROGRESS NOTES
Patient denies SI/HI/Hallucinations, anxiety or depression. Patient blunted and sad but brightens appropriately during conversation. Patient eager for discharge home today. Patient observed out on the unit watching television. Patient taking prescribed medications, eating provided meals, and attending groups.

## 2022-12-16 ENCOUNTER — HOSPITAL ENCOUNTER (INPATIENT)
Age: 72
LOS: 5 days | Discharge: HOME | DRG: 885 | End: 2022-12-21
Attending: EMERGENCY MEDICINE | Admitting: PSYCHIATRY & NEUROLOGY
Payer: MEDICARE

## 2022-12-16 DIAGNOSIS — F32.A DEPRESSION WITH SUICIDAL IDEATION: Primary | ICD-10-CM

## 2022-12-16 DIAGNOSIS — R45.851 DEPRESSION WITH SUICIDAL IDEATION: Primary | ICD-10-CM

## 2022-12-16 PROBLEM — F32.9 MAJOR DEPRESSIVE DISORDER, SINGLE EPISODE, UNSPECIFIED: Status: ACTIVE | Noted: 2022-12-16

## 2022-12-16 LAB
ACETAMINOPHEN LEVEL: <5 MCG/ML (ref 10–30)
ALBUMIN SERPL-MCNC: 4.2 G/DL (ref 3.5–5.2)
ALP BLD-CCNC: 91 U/L (ref 40–129)
ALT SERPL-CCNC: 10 U/L (ref 0–40)
AMPHETAMINE SCREEN, URINE: NOT DETECTED
ANION GAP SERPL CALCULATED.3IONS-SCNC: 12 MMOL/L (ref 7–16)
AST SERPL-CCNC: 17 U/L (ref 0–39)
BARBITURATE SCREEN URINE: NOT DETECTED
BASOPHILS ABSOLUTE: 0.02 E9/L (ref 0–0.2)
BASOPHILS RELATIVE PERCENT: 0.3 % (ref 0–2)
BENZODIAZEPINE SCREEN, URINE: NOT DETECTED
BILIRUB SERPL-MCNC: 0.6 MG/DL (ref 0–1.2)
BILIRUBIN URINE: NEGATIVE
BLOOD, URINE: NEGATIVE
BUN BLDV-MCNC: 12 MG/DL (ref 6–23)
CALCIUM SERPL-MCNC: 9.5 MG/DL (ref 8.6–10.2)
CANNABINOID SCREEN URINE: NOT DETECTED
CHLORIDE BLD-SCNC: 106 MMOL/L (ref 98–107)
CLARITY: CLEAR
CO2: 25 MMOL/L (ref 22–29)
COCAINE METABOLITE SCREEN URINE: NOT DETECTED
COLOR: YELLOW
CREAT SERPL-MCNC: 1.2 MG/DL (ref 0.7–1.2)
EKG ATRIAL RATE: 87 BPM
EKG P AXIS: 61 DEGREES
EKG P-R INTERVAL: 150 MS
EKG Q-T INTERVAL: 368 MS
EKG QRS DURATION: 68 MS
EKG QTC CALCULATION (BAZETT): 442 MS
EKG R AXIS: 62 DEGREES
EKG T AXIS: 49 DEGREES
EKG VENTRICULAR RATE: 87 BPM
EOSINOPHILS ABSOLUTE: 0.06 E9/L (ref 0.05–0.5)
EOSINOPHILS RELATIVE PERCENT: 0.8 % (ref 0–6)
ETHANOL: <10 MG/DL (ref 0–0.08)
FENTANYL SCREEN, URINE: NOT DETECTED
GFR SERPL CREATININE-BSD FRML MDRD: >60 ML/MIN/1.73
GLUCOSE BLD-MCNC: 112 MG/DL (ref 74–99)
GLUCOSE URINE: NEGATIVE MG/DL
HCG(URINE) PREGNANCY TEST: NEGATIVE
HCT VFR BLD CALC: 41.2 % (ref 37–54)
HEMOGLOBIN: 13.7 G/DL (ref 12.5–16.5)
IMMATURE GRANULOCYTES #: 0.02 E9/L
IMMATURE GRANULOCYTES %: 0.3 % (ref 0–5)
INFLUENZA A: NOT DETECTED
INFLUENZA B: NOT DETECTED
KETONES, URINE: NEGATIVE MG/DL
LEUKOCYTE ESTERASE, URINE: NEGATIVE
LYMPHOCYTES ABSOLUTE: 1.03 E9/L (ref 1.5–4)
LYMPHOCYTES RELATIVE PERCENT: 14.5 % (ref 20–42)
Lab: NORMAL
MCH RBC QN AUTO: 30.1 PG (ref 26–35)
MCHC RBC AUTO-ENTMCNC: 33.3 % (ref 32–34.5)
MCV RBC AUTO: 90.5 FL (ref 80–99.9)
METHADONE SCREEN, URINE: NOT DETECTED
MONOCYTES ABSOLUTE: 0.57 E9/L (ref 0.1–0.95)
MONOCYTES RELATIVE PERCENT: 8 % (ref 2–12)
NEUTROPHILS ABSOLUTE: 5.41 E9/L (ref 1.8–7.3)
NEUTROPHILS RELATIVE PERCENT: 76.1 % (ref 43–80)
NITRITE, URINE: NEGATIVE
OPIATE SCREEN URINE: NOT DETECTED
OXYCODONE URINE: NOT DETECTED
PDW BLD-RTO: 13.5 FL (ref 11.5–15)
PH UA: 6 (ref 5–9)
PHENCYCLIDINE SCREEN URINE: NOT DETECTED
PLATELET # BLD: 242 E9/L (ref 130–450)
PMV BLD AUTO: 9.6 FL (ref 7–12)
POTASSIUM SERPL-SCNC: 4.2 MMOL/L (ref 3.5–5)
PROTEIN UA: NEGATIVE MG/DL
RBC # BLD: 4.55 E12/L (ref 3.8–5.8)
SALICYLATE, SERUM: <0.3 MG/DL (ref 0–30)
SARS-COV-2 RNA, RT PCR: NOT DETECTED
SODIUM BLD-SCNC: 143 MMOL/L (ref 132–146)
SPECIFIC GRAVITY UA: 1.01 (ref 1–1.03)
T4 TOTAL: 8.4 MCG/DL (ref 4.5–11.7)
TOTAL PROTEIN: 6.5 G/DL (ref 6.4–8.3)
TRICYCLIC ANTIDEPRESSANTS SCREEN SERUM: NEGATIVE NG/ML
TSH SERPL DL<=0.05 MIU/L-ACNC: 2.32 UIU/ML (ref 0.27–4.2)
UROBILINOGEN, URINE: 0.2 E.U./DL
WBC # BLD: 7.1 E9/L (ref 4.5–11.5)

## 2022-12-16 PROCEDURE — 84443 ASSAY THYROID STIM HORMONE: CPT

## 2022-12-16 PROCEDURE — 81003 URINALYSIS AUTO W/O SCOPE: CPT

## 2022-12-16 PROCEDURE — 36415 COLL VENOUS BLD VENIPUNCTURE: CPT

## 2022-12-16 PROCEDURE — 83036 HEMOGLOBIN GLYCOSYLATED A1C: CPT

## 2022-12-16 PROCEDURE — 80053 COMPREHEN METABOLIC PANEL: CPT

## 2022-12-16 PROCEDURE — 84436 ASSAY OF TOTAL THYROXINE: CPT

## 2022-12-16 PROCEDURE — 80307 DRUG TEST PRSMV CHEM ANLYZR: CPT

## 2022-12-16 PROCEDURE — 93010 ELECTROCARDIOGRAM REPORT: CPT | Performed by: INTERNAL MEDICINE

## 2022-12-16 PROCEDURE — 1240000000 HC EMOTIONAL WELLNESS R&B

## 2022-12-16 PROCEDURE — 82077 ASSAY SPEC XCP UR&BREATH IA: CPT

## 2022-12-16 PROCEDURE — 85025 COMPLETE CBC W/AUTO DIFF WBC: CPT

## 2022-12-16 PROCEDURE — 80061 LIPID PANEL: CPT

## 2022-12-16 PROCEDURE — 81025 URINE PREGNANCY TEST: CPT

## 2022-12-16 PROCEDURE — 80179 DRUG ASSAY SALICYLATE: CPT

## 2022-12-16 PROCEDURE — 99285 EMERGENCY DEPT VISIT HI MDM: CPT

## 2022-12-16 PROCEDURE — 80143 DRUG ASSAY ACETAMINOPHEN: CPT

## 2022-12-16 PROCEDURE — 87636 SARSCOV2 & INF A&B AMP PRB: CPT

## 2022-12-16 PROCEDURE — 93005 ELECTROCARDIOGRAM TRACING: CPT | Performed by: PHYSICIAN ASSISTANT

## 2022-12-16 RX ORDER — HALOPERIDOL 2 MG/1
3 TABLET ORAL EVERY 6 HOURS PRN
Status: DISCONTINUED | OUTPATIENT
Start: 2022-12-16 | End: 2022-12-21 | Stop reason: HOSPADM

## 2022-12-16 RX ORDER — LANOLIN ALCOHOL/MO/W.PET/CERES
3 CREAM (GRAM) TOPICAL NIGHTLY
Status: DISCONTINUED | OUTPATIENT
Start: 2022-12-16 | End: 2022-12-21 | Stop reason: HOSPADM

## 2022-12-16 RX ORDER — ACETAMINOPHEN 325 MG/1
650 TABLET ORAL EVERY 4 HOURS PRN
Status: DISCONTINUED | OUTPATIENT
Start: 2022-12-16 | End: 2022-12-21 | Stop reason: HOSPADM

## 2022-12-16 RX ORDER — HYDROXYZINE PAMOATE 50 MG/1
50 CAPSULE ORAL 3 TIMES DAILY PRN
Status: DISCONTINUED | OUTPATIENT
Start: 2022-12-16 | End: 2022-12-21 | Stop reason: HOSPADM

## 2022-12-16 RX ORDER — NICOTINE 21 MG/24HR
1 PATCH, TRANSDERMAL 24 HOURS TRANSDERMAL DAILY
Status: DISCONTINUED | OUTPATIENT
Start: 2022-12-16 | End: 2022-12-21 | Stop reason: HOSPADM

## 2022-12-16 RX ORDER — MAGNESIUM HYDROXIDE/ALUMINUM HYDROXICE/SIMETHICONE 120; 1200; 1200 MG/30ML; MG/30ML; MG/30ML
30 SUSPENSION ORAL PRN
Status: DISCONTINUED | OUTPATIENT
Start: 2022-12-16 | End: 2022-12-21 | Stop reason: HOSPADM

## 2022-12-16 RX ORDER — HALOPERIDOL 5 MG/ML
3 INJECTION INTRAMUSCULAR EVERY 6 HOURS PRN
Status: DISCONTINUED | OUTPATIENT
Start: 2022-12-16 | End: 2022-12-21 | Stop reason: HOSPADM

## 2022-12-16 ASSESSMENT — LIFESTYLE VARIABLES
HOW OFTEN DO YOU HAVE A DRINK CONTAINING ALCOHOL: NEVER
HOW MANY STANDARD DRINKS CONTAINING ALCOHOL DO YOU HAVE ON A TYPICAL DAY: PATIENT DOES NOT DRINK
HOW MANY STANDARD DRINKS CONTAINING ALCOHOL DO YOU HAVE ON A TYPICAL DAY: PATIENT DOES NOT DRINK
HOW OFTEN DO YOU HAVE A DRINK CONTAINING ALCOHOL: NEVER

## 2022-12-16 ASSESSMENT — PAIN - FUNCTIONAL ASSESSMENT: PAIN_FUNCTIONAL_ASSESSMENT: NONE - DENIES PAIN

## 2022-12-16 ASSESSMENT — SLEEP AND FATIGUE QUESTIONNAIRES
DO YOU HAVE DIFFICULTY SLEEPING: NO
DO YOU USE A SLEEP AID: NO
AVERAGE NUMBER OF SLEEP HOURS: 8

## 2022-12-16 ASSESSMENT — PATIENT HEALTH QUESTIONNAIRE - PHQ9: SUM OF ALL RESPONSES TO PHQ QUESTIONS 1-9: 11

## 2022-12-16 NOTE — ED NOTES
Department of Emergency Medicine  FIRST PROVIDER TRIAGE NOTE             Independent MLP           12/16/22  1:32 PM EST    Date of Encounter: 12/16/22   MRN: [id number]      HPI: Junior Cervantes is a 67 y.o. male who presents to the ED for Suicidal (Patient saw his psychiatrist today who recommended that he come to the hospital today to get admitted. Patient does not have a plan.)     ROS: Negative for fever or rash. PE: Gen Appearance/Constitutional: alert     Initial Plan of Care: All treatment areas with department are currently occupied. Plan to order/Initiate the following while awaiting opening in ED: labs.   Initiate Treatment-Testing, Proceed toTreatment Area When Bed Available for ED Attending/MLP to Continue Care    Electronically signed by Luz Wagoner PA-C   DD: 12/16/22       Luz Wagoner PA-C  12/16/22 [id number]

## 2022-12-16 NOTE — ED NOTES
[facility] called to verify that pt did come into ED and stated that if he had not come in, they were going to call in a welfare check on him.       Aarti Camarena  12/16/22 8512

## 2022-12-16 NOTE — ED PROVIDER NOTES
Department of Emergency Medicine   ED  Provider Note  Admit Date/RoomTime: 12/16/2022  1:33 PM  ED Room: [address] [place] [address]              12/16/22  1:54 PM EST    HPI: Rula Lundy 67 y.o. male presents with a complaint of depression and suicidal ideation beginning quite some time ago. Complaint has been constant and became more severe today which is what prompted the visit. Worsening depression with suicidal ideation. No specific plain, denies other complaints. Sent by the [place] for admission. Review of Systems:   A complete review of systems was performed and pertinent positives and negatives are stated within HPI, all other systems reviewed and are negative.            --------------------------------------------- PAST HISTORY ---------------------------------------------  Past Medical History:  has a past medical history of Cancer ([facility] Depression, Major depressive disorder, recurrent episode, severe ([facility] MDD (major depressive disorder), recurrent severe, without psychosis ([facility] Overactive bladder, Prostate cancer ([facility] Suicidal ideations, and Urinary incontinence. Past Surgical History:  has a past surgical history that includes other surgical history (02/21/2014); back surgery; Prostatectomy; Appendectomy; Tonsillectomy; and Colonoscopy. Social History:  reports that he has never smoked. He has never used smokeless tobacco. He reports that he does not drink alcohol and does not use drugs. Family History: family history includes Cancer in his brother and sister; Depression in his brother and sister. Family history is non contributory unless otherwise noted    The patients home medications have been reviewed. Allergies: Ketamine    -------------------------------------------------- RESULTS -------------------------------------------------  All laboratory and imaging studies were reviewed by myself.     LABS: reviewed and interpreted by me  Results for orders placed or performed during the hospital encounter of 12/16/22   COVID-19 & Influenza Combo    Specimen: Nasopharyngeal Swab   Result Value Ref Range    SARS-CoV-2 RNA, RT PCR NOT DETECTED NOT DETECTED    INFLUENZA A NOT DETECTED NOT DETECTED    INFLUENZA B NOT DETECTED NOT DETECTED   CBC with Auto Differential   Result Value Ref Range    WBC 7.1 4.5 - 11.5 E9/L    RBC 4.55 3.80 - 5.80 E12/L    Hemoglobin 13.7 12.5 - 16.5 g/dL    Hematocrit 41.2 37.0 - 54.0 %    MCV 90.5 80.0 - 99.9 fL    MCH 30.1 26.0 - 35.0 pg    MCHC 33.3 32.0 - 34.5 %    RDW 13.5 11.5 - 15.0 fL    Platelets 681 389 - 405 E9/L    MPV 9.6 7.0 - 12.0 fL    Neutrophils % 76.1 43.0 - 80.0 %    Immature Granulocytes % 0.3 0.0 - 5.0 %    Lymphocytes % 14.5 (L) 20.0 - 42.0 %    Monocytes % 8.0 2.0 - 12.0 %    Eosinophils % 0.8 0.0 - 6.0 %    Basophils % 0.3 0.0 - 2.0 %    Neutrophils Absolute 5.41 1.80 - 7.30 E9/L    Immature Granulocytes # 0.02 E9/L    Lymphocytes Absolute 1.03 (L) 1.50 - 4.00 E9/L    Monocytes Absolute 0.57 0.10 - 0.95 E9/L    Eosinophils Absolute 0.06 0.05 - 0.50 E9/L    Basophils Absolute 0.02 0.00 - 0.20 E9/L   Urinalysis   Result Value Ref Range    Color, UA Yellow Straw/Yellow    Clarity, UA Clear Clear    Glucose, Ur Negative Negative mg/dL    Bilirubin Urine Negative Negative    Ketones, Urine Negative Negative mg/dL    Specific Gravity, UA 1.015 1.005 - 1.030    Blood, Urine Negative Negative    pH, UA 6.0 5.0 - 9.0    Protein, UA Negative Negative mg/dL    Urobilinogen, Urine 0.2 <2.0 E.U./dL    Nitrite, Urine Negative Negative    Leukocyte Esterase, Urine Negative Negative   Urine Drug Screen   Result Value Ref Range    Drug Screen Comment: see below    Pregnancy, Urine   Result Value Ref Range    HCG(Urine) Pregnancy Test NEGATIVE NEGATIVE   EKG 12 Lead   Result Value Ref Range    Ventricular Rate 87 BPM    Atrial Rate 87 BPM    P-R Interval 150 ms    QRS Duration 68 ms    Q-T Interval 368 ms    QTc Calculation (Bazett) 442 ms    P Axis 61 degrees R Axis 62 degrees    T Axis 49 degrees       RADIOLOGY:  Interpreted by Radiologist.  No orders to display       EKG Interpretation  Interpreted by emergency department physician, Dr. Vick Carey: normal sinus   Rate: normal  Axis: normal  Conduction: normal  ST Segments: no acute change  T Waves: no acute change    Clinical Impression: Sinus rhythm, no acute ischemic changes  Comparison to Old EKG  Stable from prior EKG        ------------------------- NURSING NOTES AND VITALS REVIEWED ---------------------------   The nursing notes within the ED encounter and vital signs as below have been reviewed. /83   Pulse 91   Temp 97.9 °F (36.6 °C)   Resp 16   Ht 6' 2\" (1.88 m)   Wt 170 lb (77.1 kg)   SpO2 97%   BMI 21.83 kg/m²   Oxygen Saturation Interpretation: Normal            ---------------------------------------------------PHYSICAL EXAM--------------------------------------      Constitutional/General: Alert and oriented x3   Head: Normocephalic, atraumatic  Eyes: PERRL, EOMI  ENT: Oropharynx clear, handling secretions, no trismus  Neck: Supple, full ROM, no meningeal signs  Pulmonary: Lungs clear to auscultation bilaterally, no wheezes, rales, or rhonchi. Not in respiratory distress  Cardiovascular:  Regular rate and rhythm, no murmurs, gallops, or rubs. 2+ distal pulses  Abdomen: Soft, non tender, non distended, +BS, no rebound, guarding, or rigidity. No pulsatile masses appreciated  Extremities: Moves all extremities x 4. Warm and well perfused, no clubbing, cyanosis, or edema. Capillary refill <3 seconds  Skin: warm and dry without rash  Neurologic: GCS 15, no focal deficits  Psych: flat Affect      ------------------------------------------ PROGRESS NOTES ------------------------------------------     Medical decision making:  Patient is medically stable for mental health evaluation    Consultations:   Behavioral Health    Re-Evaluations:        Counseling:    The emergency provider has spoken with thepatient and discussed todays results, in addition to providing specific details for the plan of care and counseling regarding the diagnosis and prognosis. Questions are answered at this time and they are agreeable with the plan.         --------------------------------- IMPRESSION AND DISPOSITION ---------------------------------    IMPRESSION  1.  Depression with suicidal ideation        DISPOSITION  Disposition: as per consultant  Patient condition is stable            Pascual Liriano MD  12/16/22 2984

## 2022-12-16 NOTE — ED NOTES
Behavioral Health Crisis Assessment      Chief Complaint:  The pt is a 67 yr old white male who was referred to the ED by the [facility]     Mental Status Exam:  The pt presents calm and cooperative with a flat affect, depressed mood and circumstantial speech. He is oriented times 4 and is a fair historian. He denied a hx of AVH and HI. He reported daily SI. He has good eye contact and hygiene and his speech is quiet/soft. He has poor judgement and insight. Legal Status  [] Voluntary:  [x] Involuntary, Issued by:  ED doctor    Gender  [x] Male [] Female [] Transgender  [] Other    Sexual Orientation    [x] Heterosexual [] Homosexual [] Bisexual [] Other    Brief Clinical Summary:  The pt stated that he ha an appointment with his psychiatrist at the [facility] and she recommended that he go to the ED. He stated he went home and got into a fight with his wife and then thought he should come in and drove self here. He stated that he feels suicidal daily and his wife holds his pills so he wont OD. He stated that he admitted that his depression has been getting worse. He also admitted to a med change several weeks ago. He stated that him and his wife are legally  and he lives with her. He stated that he thinks a lot about where he is going to go and might go through a homeless vet program.  He stated that his finances depress him. He reported that he has had 3 attempts in the last 2 yrs and the last one was between 6-12 mo ago. He denied a hx of HI and AVH. He stated that he has been sober for 40 yrs form alcohol. The pt stated that he would like to be referred to the [facility].       Collateral Information:  NONE    Risk Factors:  Several MH hospitalizations  Isolation  Hx of trauma  Gender risk (male over 61)  Family hx of MH and alcohol abuse   Suicide attempts  Life altering event  MH diagnoses    Homeless     Protective Factors: social connectedness to family  positive rapport with [address] providers & PCP/ medication compliant  help-seeking behavior   Steady income  goals & hope for the future  has access to essential needs  good communication skills  no access to weapons       Suicidal Ideations:   [x] Reports: Stated rosangela    [x] Past [x] Present   [] Denies    Suicide Attempts:  [x] Reports: 3 attempts in last 3 yrs- last one was 6-12 mo ago- told self JAn 2021 drank antifreeze- told on self. OD pills 2 yrs ago- told on self  [] Denies    C-SSRS Screening Completed by RN: Current Suicide Risk:  [] No Risk [] Low [] Moderate [] High    Homicidal Ideations  [] Reports:   [] Past [] Present   [x] Denies     Self Injurious/Self Mutilation Behaviors:   [] Reports:    [] Past [] Present   [x] Denies    Hallucinations/Delusions   [] Reports:   [x] Denies     Substance Use/Alcohol Use/Addiction:   [] Reports:   [x] Denies Sober etoh 40 yrs  [x] SBIRT Screen Complete. Current or Past Substance Abuse Treatment  [] Yes, When and Where:   [x] No    Current or Past Mental Health Treatment:  [x] Yes, When and Where: Goes to the [place] and has a counselor and a doctor- is on 2 meds - they were recently decreased 3-4 weeks ago- reported compliant  [] No    Legal Issues:  []  Yes (Specify)  [x]  No    Access to Weapons:  []  Yes (Specify)  [x]  No    Trauma History  [] Reports:  [x] Denies     Living Situation: The pt stated that he lives with his wife that he is legally  from. - they had a fight and she could kick him out    Employment: Retired     Education Level: Grad HS    Violence Risk Screening:        Have you ever thought about hurting someone? [x]  No  []  Yes (Ask the questions listed below)   When? Did you follow through with the thoughts? [] No     [] Yes- When and what happened? 2.  Have you ever threatened anyone? [x]  No  []  Yes (Ask the questions listed below)   When and what happened? Have you ever threatened someone with a gun, knife or other weapon?    []  No  []  Yes - When and what happened? 2. Have you ever had an order of protection taken out against you? []  Yes [x]  No  3. Have you ever been arrested due to violence? []  Yes [x]  No  4. Have you ever been cruel to animals?  []  Yes [x]  No    After consideration of C-SSRS screening results, C-SSRS assessments, and this professional's assessment the patient's overall suicide risk assessed to be:  [] No Risk  [] Low   [x] Moderate   [] High     [x] Discussed current suicide risk, protective and risk factors with RN and ED Physician     Disposition   [] Home:   [] Outpatient Provider:   [] Crisis Unit:   [x] [facility]  [] Other:                    Pawel Reid, [facility]  12/16/22 [address] [place] [address]  12/16/22 [address] [place] [address]  12/16/22 2759

## 2022-12-16 NOTE — ED NOTES
The pt was referred to the [facility], [facility] [place]  12/16/22 7064

## 2022-12-17 PROBLEM — F60.9 CLUSTER B PERSONALITY DISORDER IN ADULT (HCC): Status: ACTIVE | Noted: 2022-12-17

## 2022-12-17 PROBLEM — F32.A DEPRESSION WITH SUICIDAL IDEATION: Status: ACTIVE | Noted: 2022-12-17

## 2022-12-17 PROBLEM — R45.851 DEPRESSION WITH SUICIDAL IDEATION: Status: ACTIVE | Noted: 2022-12-17

## 2022-12-17 PROBLEM — F32.9 MAJOR DEPRESSIVE DISORDER, SINGLE EPISODE, UNSPECIFIED: Status: RESOLVED | Noted: 2022-12-16 | Resolved: 2022-12-17

## 2022-12-17 LAB
CHOLESTEROL, TOTAL: 227 MG/DL (ref 0–199)
HBA1C MFR BLD: 5.2 % (ref 4–5.6)
HDLC SERPL-MCNC: 41 MG/DL
LDL CHOLESTEROL CALCULATED: 157 MG/DL (ref 0–99)
TRIGL SERPL-MCNC: 143 MG/DL (ref 0–149)
VLDLC SERPL CALC-MCNC: 29 MG/DL

## 2022-12-17 PROCEDURE — 1240000000 HC EMOTIONAL WELLNESS R&B

## 2022-12-17 PROCEDURE — 6370000000 HC RX 637 (ALT 250 FOR IP): Performed by: NURSE PRACTITIONER

## 2022-12-17 PROCEDURE — 90792 PSYCH DIAG EVAL W/MED SRVCS: CPT | Performed by: NURSE PRACTITIONER

## 2022-12-17 RX ORDER — CITALOPRAM 20 MG/1
10 TABLET ORAL DAILY
Status: DISCONTINUED | OUTPATIENT
Start: 2022-12-17 | End: 2022-12-21 | Stop reason: HOSPADM

## 2022-12-17 RX ADMIN — CITALOPRAM HYDROBROMIDE 10 MG: 20 TABLET ORAL at 13:48

## 2022-12-17 ASSESSMENT — LIFESTYLE VARIABLES
HOW OFTEN DO YOU HAVE A DRINK CONTAINING ALCOHOL: NEVER
HOW MANY STANDARD DRINKS CONTAINING ALCOHOL DO YOU HAVE ON A TYPICAL DAY: PATIENT DOES NOT DRINK

## 2022-12-17 ASSESSMENT — PATIENT HEALTH QUESTIONNAIRE - PHQ9: SUM OF ALL RESPONSES TO PHQ QUESTIONS 1-9: 15

## 2022-12-17 ASSESSMENT — SLEEP AND FATIGUE QUESTIONNAIRES
DO YOU HAVE DIFFICULTY SLEEPING: NO
AVERAGE NUMBER OF SLEEP HOURS: 8
DO YOU USE A SLEEP AID: NO

## 2022-12-17 NOTE — ED NOTES
The pt was accepted to [facility] room 7310a. Disposition called to Ronal Voss in admitting. Call to the [facility] and spoke to JENNY and informed her that we are accepting the pt here and the pt would eventually liked to be transferred to the [place]. Call to the [place] and left a message stating that we are admitting the pt here and gave them the number for [facility] so that they can follow up with us.      [facility]  12/16/22 1926

## 2022-12-17 NOTE — PROGRESS NOTES
[facility]  Admission Note     Admission Type:   Admission Type: Involuntary    Reason for admission:  Reason for Admission: \"Depression and suicidal ideations      Addictive Behavior:   Addictive Behavior  In the Past 3 Months, Have You Felt or Has Someone Told You That You Have a Problem With  : None    Medical Problems:   Past Medical History:   Diagnosis Date    Cancer ([facility]     Depression     Major depressive disorder, recurrent episode, severe ([facility] 9/14/2011    MDD (major depressive disorder), recurrent severe, without psychosis ([facility]     Overactive bladder 9/14/2011    Prostate cancer ([facility]     Suicidal ideations     Urinary incontinence        Status EXAM:  Mental Status and Behavioral Exam  Normal: No  Level of Assistance: Independent/Self  Facial Expression: Expressionless  Affect: Congruent  Level of Consciousness: Alert  Frequency of Checks: 4 times per hour, close  Mood:Normal: No  Mood: Depressed, Sad  Motor Activity:Normal: No  Motor Activity: Decreased  Eye Contact: Good  Observed Behavior: Cooperative, Preoccupied  Sexual Misconduct History: Current - no  Preception: [place] to person, [place] to time, [place] to place, [place] to situation  Attention:Normal: Yes  Thought Processes: Circumstantial  Thought Content:Normal: Yes  Depression Symptoms: Feelings of helplessness, Feelings of hopelessess, Impaired concentration  Anxiety Symptoms: Generalized  Maribeth Symptoms: No problems reported or observed. Hallucinations: None  Delusions: No  Memory:Normal: No  Memory: Poor recent  Insight and Judgment: No  Insight and Judgment: Poor judgment    Tobacco Screening:  Practical Counseling, on admission, sarah X, if applicable and completed (first 3 are required if patient doesn't refuse):             ( x) Recognizing danger situations (included triggers and roadblocks)                    ( x) Coping skills (new ways to manage stress,relaxation techniques, changing routine, distraction) ( ) Basic information about quitting (benefits of quitting, techniques in how to quit, available resources  ( ) Referral for counseling faxed to [facility]                                                                                                                   ( ) Patient refused counseling  ( x) Patient has not smoked in the last 30 days    Metabolic Screening:    No results found for: LABA1C    Lab Results   Component Value Date    CHOL 227 (H) 09/11/2017    CHOL 220 (H) 06/22/2017     Lab Results   Component Value Date    TRIG 118 09/11/2017    TRIG 204 (H) 06/22/2017     Lab Results   Component Value Date    HDL 40 09/11/2017    HDL 30 06/22/2017     No components found for: LDLCAL  Lab Results   Component Value Date    LABVLDL 24 09/11/2017    LABVLDL 41 06/22/2017         Body mass index is 21.83 kg/m².     BP Readings from Last 2 Encounters:   12/16/22 123/82   11/09/22 (!) 145/95           Pt admitted with followings belongings:  Dental Appliances: None  Vision - Corrective Lenses: Eyeglasses  Hearing Aid: None  Jewelry: None  Body Piercings Removed: N/A  Clothing: Shirt, Footwear, Pants, Jacket/Coat  Other Valuables: Marialuisa Schwartz, Kosta Morris RN

## 2022-12-17 NOTE — ED NOTES
Called to give report to [address] [place] [address], they said they were busy and would call me back.      Maicol Conrad, RN  12/16/22 2038

## 2022-12-17 NOTE — PROGRESS NOTES
67 yr old male presents admitted to the psych unit with an admitting Dx of major depression. Patient is A+Ox 4. He currently denies SI, HI, and internal stimulation. Patient contracts for safety and agrees to communicate to staff if experiencing SI. It's reported that patient was pink slipped 12/16 for having suicidal ideations while visiting psychiatrist at the [facility]. It's reported that patient is depressed about finances, and had a recent fight with wife prior to admission. Patient presents with a calm, pleasant demeanor however, he appears to be sad. He rates both anxiety depression 7/10. Patient is cooperative with staff. Patient has a psychiatric Hx which includes depression and multiple suicide attempts. Patient's medical Hx includes: prostate cancer, mixed hyperlipidemia, hypothyroidism, vitamin D deficiency, and an overactive bladder. Patient is Covid negative. Alcohol and drug screen are negative. [id number]. [id number]/53  Patient has been oriented to the unit and administered refreshments. All documents have been explained, agreed upon, and signed. Patient will work toward discharge goals which includes medication compliance and group participation. Staff will continue to offer support and interventions per request and as required.
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
## 2022-12-17 NOTE — PLAN OF CARE
Problem: Self Harm/Suicidality  Goal: Will have no self-injury during hospital stay  Description: INTERVENTIONS:  1. Ensure constant observer at bedside with Q15M safety checks  2. Maintain a safe environment  3. Secure patient belongings  4. Ensure family/visitors adhere to safety recommendations  5. Ensure safety tray has been added to patient's diet order  6. Every shift and PRN: Re-assess suicidal risk via Frequent Screener    12/17/2022 1153 by Sharlene Reyes RN  Outcome: Progressing  12/16/2022 2318 by Monica Srinivasan RN  Outcome: Progressing     Problem: Behavior  Goal: Pt/Family maintain appropriate behavior and adhere to behavioral management agreement, if implemented  Description: INTERVENTIONS:  1. Assess patient/family's coping skills and  non-compliant behavior (including use of illegal substances)  2. Notify security of behavior or suspected illegal substances which indicate the need for search of the family and/or belongings  3. Encourage verbalization of thoughts and concerns in a socially appropriate manner  4. Utilize positive, consistent limit setting strategies supporting safety of patient, staff and others  5. Encourage participation in the decision making process about the behavioral management agreement  6. If a visitor's behavior poses a threat to safety call refer to organization policy. 7. Initiate consult with , Psychosocial CNS, Spiritual Care as appropriate  12/17/2022 1153 by Sharlene Reyes RN  Outcome: Progressing  12/16/2022 2318 by Monica Srinivasan RN  Outcome: Progressing     Problem: Involuntary Admit  Goal: Will cooperate with staff recommendations and doctor's orders and will demonstrate appropriate behavior  Description: INTERVENTIONS:  1. Treat underlying conditions and offer medication as ordered  2. Educate regarding involuntary admission procedures and rules  3.  Contain excessive/inappropriate behavior per unit and hospital policies  96/84/1100 1153 by Laura Carvalho RN  Outcome: Progressing  12/16/2022 2318 by Mary Comer RN  Outcome: Progressing     Patient denies SI/HI/Hallucinations. Patient minimizing circumstances of admission. Patient observed out on the unit, keeping to himself. Patient appears sad. Patient does not have any medications prescribed at this time. Patient eating provided meals without issue.

## 2022-12-17 NOTE — PLAN OF CARE
Problem: Depression  Goal: Will be euthymic at discharge  Description: INTERVENTIONS:  1. Administer medication as ordered  2. Provide emotional support via 1:1 interaction with staff  3. Encourage involvement in milieu/groups/activities  4. Monitor for social isolation  Outcome: Progressing     Problem: Behavior  Goal: Pt/Family maintain appropriate behavior and adhere to behavioral management agreement, if implemented  Description: INTERVENTIONS:  1. Assess patient/family's coping skills and  non-compliant behavior (including use of illegal substances)  2. Notify security of behavior or suspected illegal substances which indicate the need for search of the family and/or belongings  3. Encourage verbalization of thoughts and concerns in a socially appropriate manner  4. Utilize positive, consistent limit setting strategies supporting safety of patient, staff and others  5. Encourage participation in the decision making process about the behavioral management agreement  6. If a visitor's behavior poses a threat to safety call refer to organization policy. 7. Initiate consult with , Psychosocial CNS, Spiritual Care as appropriate  Outcome: Progressing     Problem: Involuntary Admit  Goal: Will cooperate with staff recommendations and doctor's orders and will demonstrate appropriate behavior  Description: INTERVENTIONS:  1. Treat underlying conditions and offer medication as ordered  2. Educate regarding involuntary admission procedures and rules  3. Contain excessive/inappropriate behavior per unit and hospital policies  Outcome: Progressing     Problem: Self Harm/Suicidality  Goal: Will have no self-injury during hospital stay  Description: INTERVENTIONS:  1. Ensure constant observer at bedside with Q15M safety checks  2. Maintain a safe environment  3. Secure patient belongings  4. Ensure family/visitors adhere to safety recommendations  5.   Ensure safety tray has been added to patient's diet order  6.   Every shift and PRN: Re-assess suicidal risk via Frequent Screener    Outcome: Progressing

## 2022-12-17 NOTE — ED NOTES
[facility], they are still not ready for report. They will call me back in 10 minutes. Spoke to Carlos.      Troy Edouard RN  12/16/22 2050
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
## 2022-12-17 NOTE — PROGRESS NOTES
[facility]  Initial Interdisciplinary Treatment Plan NOTE    Review Date & Time: 12/17/2022 0900    Patient was in treatment team    Admission Type:   Admission Type: Involuntary    Reason for admission:  Reason for Admission: \"Depression and suicidal ideations      Estimated Length of Stay Update:  1-3  Estimated Discharge Date Update: 12/20/2022    EDUCATION:   Learner Progress Toward Treatment Goals: Reviewed results and recommendations of this team    Method: Small group    Outcome: Verbalized understanding    PATIENT GOALS: None at this time    PLAN/TREATMENT RECOMMENDATIONS UPDATE: Encourage patient to attend and participate in groups. Take medication as prescribed. GOALS UPDATE:   Time frame for Short-Term Goals: Prior to discharge.     Bernie Carrera RN

## 2022-12-18 PROCEDURE — 6370000000 HC RX 637 (ALT 250 FOR IP): Performed by: PSYCHIATRY & NEUROLOGY

## 2022-12-18 PROCEDURE — 99232 SBSQ HOSP IP/OBS MODERATE 35: CPT | Performed by: NURSE PRACTITIONER

## 2022-12-18 PROCEDURE — 6370000000 HC RX 637 (ALT 250 FOR IP): Performed by: NURSE PRACTITIONER

## 2022-12-18 PROCEDURE — 1240000000 HC EMOTIONAL WELLNESS R&B

## 2022-12-18 RX ADMIN — HYDROXYZINE PAMOATE 50 MG: 50 CAPSULE ORAL at 22:11

## 2022-12-18 RX ADMIN — MELATONIN 3 MG ORAL TABLET 3 MG: 3 TABLET ORAL at 22:10

## 2022-12-18 RX ADMIN — CITALOPRAM HYDROBROMIDE 10 MG: 20 TABLET ORAL at 09:18

## 2022-12-18 NOTE — PLAN OF CARE
Problem: Depression  Goal: Will be euthymic at discharge  Description: INTERVENTIONS:  1. Administer medication as ordered  2. Provide emotional support via 1:1 interaction with staff  3. Encourage involvement in milieu/groups/activities  4. Monitor for social isolation  Outcome: Progressing     Problem: Behavior  Goal: Pt/Family maintain appropriate behavior and adhere to behavioral management agreement, if implemented  Description: INTERVENTIONS:  1. Assess patient/family's coping skills and  non-compliant behavior (including use of illegal substances)  2. Notify security of behavior or suspected illegal substances which indicate the need for search of the family and/or belongings  3. Encourage verbalization of thoughts and concerns in a socially appropriate manner  4. Utilize positive, consistent limit setting strategies supporting safety of patient, staff and others  5. Encourage participation in the decision making process about the behavioral management agreement  6. If a visitor's behavior poses a threat to safety call refer to organization policy. 7. Initiate consult with , Psychosocial CNS, Spiritual Care as appropriate  12/18/2022 0132 by Abdoul Oates RN  Outcome: Progressing  12/17/2022 1153 by Coretta Carrillo RN  Outcome: Progressing     Problem: Involuntary Admit  Goal: Will cooperate with staff recommendations and doctor's orders and will demonstrate appropriate behavior  Description: INTERVENTIONS:  1. Treat underlying conditions and offer medication as ordered  2. Educate regarding involuntary admission procedures and rules  3.  Contain excessive/inappropriate behavior per unit and hospital policies  [id number] by Abdoul Oates RN  Outcome: Progressing  12/17/2022 1153 by Coretta Carrillo RN  Outcome: Progressing

## 2022-12-18 NOTE — PLAN OF CARE
Problem: Self Harm/Suicidality  Goal: Will have no self-injury during hospital stay  Description: INTERVENTIONS:  1. Ensure constant observer at bedside with Q15M safety checks  2. Maintain a safe environment  3. Secure patient belongings  4. Ensure family/visitors adhere to safety recommendations  5. Ensure safety tray has been added to patient's diet order  6. Every shift and PRN: Re-assess suicidal risk via Frequent Screener    12/18/2022 0951 by Rupal Carrillo RN  Outcome: Progressing  12/18/2022 0132 by Haley Khan RN  Outcome: Progressing     Problem: Behavior  Goal: Pt/Family maintain appropriate behavior and adhere to behavioral management agreement, if implemented  Description: INTERVENTIONS:  1. Assess patient/family's coping skills and  non-compliant behavior (including use of illegal substances)  2. Notify security of behavior or suspected illegal substances which indicate the need for search of the family and/or belongings  3. Encourage verbalization of thoughts and concerns in a socially appropriate manner  4. Utilize positive, consistent limit setting strategies supporting safety of patient, staff and others  5. Encourage participation in the decision making process about the behavioral management agreement  6. If a visitor's behavior poses a threat to safety call refer to organization policy. 7. Initiate consult with , Psychosocial CNS, Spiritual Care as appropriate  12/18/2022 0951 by Rupal Carrillo RN  Outcome: Progressing  12/18/2022 0132 by Haley Khan RN  Outcome: Progressing     Problem: Involuntary Admit  Goal: Will cooperate with staff recommendations and doctor's orders and will demonstrate appropriate behavior  Description: INTERVENTIONS:  1. Treat underlying conditions and offer medication as ordered  2. Educate regarding involuntary admission procedures and rules  3.  Contain excessive/inappropriate behavior per unit and hospital policies  03/18/5041 [id number] by Rupal Carrillo, RN  Outcome: Progressing  12/18/2022 0132 by Haley Khan RN  Outcome: Progressing

## 2022-12-18 NOTE — PROGRESS NOTES
Attended morning community meeting. Updated on staffing and daily expectations. Shared goal for the day as to communicate.

## 2022-12-18 NOTE — GROUP NOTE
Group Therapy Note    Date: 12/18/2022    Group Start Time: 1000  Group End Time: 6482  Group Topic: Cognitive Skills    [facility]    YOU Han LSW        Group Therapy Note    Attendees: 10       Patient's Goal: To participate in group discussion on Danilo Trivia Questions. Notes: pt was an active participant in group discussion. Status After Intervention:  Improved    Participation Level:  Active Listener and Interactive    Participation Quality: Appropriate, Attentive, Sharing, and Supportive      Speech:  normal      Thought Process/Content: Logical      Affective Functioning: Congruent      Mood: anxious      Level of consciousness:  Alert and Oriented x4      Response to Learning: Able to verbalize current knowledge/experience      Endings: None Reported    Modes of Intervention: Education, Support, Socialization, Exploration, Clarifying, and Problem-solving      Discipline Responsible: /Counselor      Signature:  YOU Cordero, RONNI

## 2022-12-18 NOTE — PROGRESS NOTES
Patient A+Ox 4. Patient denies SI, HI, and internal stimulation. Both anxiety and depression are rated 8/10. Patient presents with a sad affect. He's hopeless and helpless. He states to be worried. He demonstrates intense eye contact with an exaggerated facial expression when communicating. Patient is calm and pleasant with staff. He complies with the medication regimen. No behaviors, patient is observed watching television and eating. Patient will continue to work toward discharge goals which includes medication compliance and group participation.  Staff will continue to offer support and interventions per request and as required

## 2022-12-18 NOTE — PROGRESS NOTES
Patient denies SI/HI/Hallucinations. Patient complains of high levels of anxiety and depression, rating both an 8/10. Patient blunted and evasive during conversation. Patient taking prescribed medications, eating provided meals, and attending groups.

## 2022-12-18 NOTE — GROUP NOTE
Group Therapy Note    Date: 12/18/2022    Group Start Time: 1115  Group End Time: 5419  Group Topic: Psychoeducation    [facility]    [place], [place]                                                                        Group Therapy Note    Date: 12/18/2022    Wellness Binder Information  Module Name:  getting out of thinking traps     Patient's Goal:  Patient will be able to id steps to change cognitive distortions. Notes:  Pleasant and engaged in group, willing to share when prompted. Status After Intervention:  Improved    Participation Level:  Active Listener and Interactive    Participation Quality: Appropriate, Attentive, and Sharing      Speech:  normal      Thought Process/Content: Logical      Affective Functioning: Congruent      Mood: euthymic      Level of consciousness:  Alert, Oriented x4, and Attentive      Response to Learning: Able to verbalize/acknowledge new learning, Able to retain information, and Progressing to goal      Endings: None Reported    Modes of Intervention: Education, Support, Socialization, and Problem-solving      Discipline Responsible: Psychoeducational Specialist      Signature:  Bessie Warren

## 2022-12-18 NOTE — CARE COORDINATION
SW contacted pt wife Marichuy Valentin  (ALEXIS signed). Marichuy Valentin reports pt has early alzheimer's and he will say his mind is like swiss cheese. Marichuy Valentin reports pt has a temper and he gets easily upset. She reports pt went to the [place] on Friday and when he came home he dropped his phone on the floor. She reportedly asked him to clean his phone off which caused him to start swearing and kick the door. Marichuy Valentin reports she went in the basement to do laundry and when she came back upstairs he was gone. Marichuy Valentin reports pt has done this before. Marichuy Valentin reports sometimes with his temper she is afraid and she doesn't want live that way in her own home. She feels something needs to be done with pt temper. Marichuy Valentin knows pt is frustrated that he cant remember as much anymore but whenever she tries to help him he gets upset. Marichuy Valentin reports she is starting to get at her wits end. Marichuy Valentin reports as far as she knows pt will return home but again states he needs to do something about his temper. She also feels pt going to [facility] would be beneficial for pt. Marichuy Valentin knows that pt has been working with the [place] and they want him to try ECT. Marichuy Valentin reports there are no guns or weapons in the home. Marichuy Valentin is concerned with pt hx of suicide attempts and his frequent suicidal ideations. Marichuy Valentin reports pt recently texted her that he is giving up and stopping all of his medications. Marichuy Valentin would like to be updated on pt progress and discharge date/plan.

## 2022-12-18 NOTE — PROGRESS NOTES
BEHAVIORAL HEALTH FOLLOW-UP NOTE     12/18/2022     Patient was seen and examined in person, Chart reviewed   Patient's case discussed with staff/team    Chief Complaint: \" I was having a bad day until I went to that group but really help me. \"    Interim History: Patient seen upon the unit states that he was having a bad day until he went to group and that it really helped him. He states the group helped me \"snap out of it. \"  He states that he has not talked to his wife yet but he plans to talk to her now. He states that staff can call his wife to see what medications he is supposed to be taking currently.   He denies suicidal homicidal ideations intent or plan denies auditory or visual hallucinations seems somewhat more relaxed on the unit today      Appetite: [x] Normal/Unchanged  [] Increased  [] Decreased      Sleep:       [x] Normal/Unchanged  [] Fair       [] Poor              Energy:    [x] Normal/Unchanged  [] Increased  [] Decreased        SI [] Present  [x] Absent    HI  []Present  [x] Absent     Aggression:  [] yes  [x] no    Patient is [x] able  [] unable to CONTRACT FOR SAFETY     PAST MEDICAL/PSYCHIATRIC HISTORY:   Past Medical History:   Diagnosis Date    Cancer ([facility] [address]     Depression     Major depressive disorder, recurrent episode, severe ([facility] [address] 9/14/2011    MDD (major depressive disorder), recurrent severe, without psychosis ([facility] [address]     Overactive bladder 9/14/2011    Prostate cancer ([facility] [address]     Suicidal ideations     Urinary incontinence        FAMILY/SOCIAL HISTORY:  Family History   Problem Relation Age of Onset    Cancer Sister     Depression Sister     Cancer Brother     Depression Brother      Social History     Socioeconomic History    Marital status:      Spouse name: Not on file    Number of children: 0    Years of education: Not on file    Highest education level: Not on file   Occupational History    Not on file   Tobacco Use    Smoking status: Never    Smokeless tobacco: Never   Vaping Use    Vaping Use: Never used   Substance and Sexual Activity    Alcohol use: No     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  Explain positive(checked items) below:  [] Constitutional  [] Eyes  [] Ear/Nose/Mouth/Throat  [] Respiratory  [] CV  [] GI  []   [] Musculoskeletal  [] Skin/Breast  [] Neurological  [] Endocrine  [] Heme/Lymph  [] Allergic/Immunologic    Explanation:     MEDICATIONS:    Current Facility-Administered Medications:     citalopram (CELEXA) tablet 10 mg, 10 mg, Oral, Daily, Surinder Heath, APRN - CNP, 10 mg at 12/18/22 6006    acetaminophen (TYLENOL) tablet 650 mg, 650 mg, Oral, Q4H PRN, Matilda Borrero MD    magnesium hydroxide (MILK OF MAGNESIA) 400 MG/5ML suspension 30 mL, 30 mL, Oral, Daily PRN, Matilda Borrero MD    nicotine (NICODERM CQ) 21 MG/24HR 1 patch, 1 patch, TransDERmal, Daily, Matilda Borrero MD    aluminum & magnesium hydroxide-simethicone (MAALOX) 200-200-20 MG/5ML suspension 30 mL, 30 mL, Oral, PRN, Matilda Borrero MD    hydrOXYzine pamoate (VISTARIL) capsule 50 mg, 50 mg, Oral, TID PRN, Matilda Borrero MD    haloperidol (HALDOL) tablet 3 mg, 3 mg, Oral, Q6H PRN **OR** haloperidol lactate (HALDOL) injection 3 mg, 3 mg, IntraMUSCular, Q6H PRN, Matilda Borrero MD    melatonin tablet 3 mg, 3 mg, Oral, Nightly, Matilda Borrero MD      Examination:  /75   Pulse 86   Temp 97.9 °F (36.6 °C) (Temporal)   Resp 15   Ht 6' 2\" (1.88 m)   Wt 170 lb (77.1 kg)   SpO2 99%   BMI 21.83 kg/m²   Gait - steady  Medication side effects(SE):     Mental Status Examination:    Level of consciousness:  within normal limits   Appearance:  fair grooming and fair hygiene  Behavior/Motor:  no abnormalities noted  Attitude toward examiner:  cooperative  Speech:  spontaneous, normal rate and normal volume   Mood: \" I am feeling good. \"  Affect: Bright appropriate and pleasant  Thought processes: Linear thought flight of ideas loose associations  Thought content: Devoid of any auditory visual hallucinations delusions or other perceptual normalities.   Denies SI/HI intent or plan   Language: able to name objects and repeate phrases  Remote Memory: intact  Recent Memory: intact  Cognition:  oriented to person, place, and time   Fund of Knowledge: Vocabulary intact, pt is aware of current events and past history  Attetion and Concentration intact  Insight fair   Judgement fair       ASSESSMENT: Patient symptoms are:  [] Well controlled  [x] Improving  [] Worsening  [] No change      Diagnosis:  Principal Problem:    MDD (major depressive disorder), recurrent severe, without psychosis ([facility] [address]  Active Problems:    Cluster B personality disorder in adult [facility])    Depression with suicidal ideation  Resolved Problems:    Major depressive disorder, single episode, unspecified      LABS:    Recent Labs     12/16/22  1400   WBC 7.1   HGB 13.7        Recent Labs     12/16/22  1400      K 4.2      CO2 25   BUN 12   CREATININE 1.2   GLUCOSE 112*     Recent Labs     12/16/22  1400   BILITOT 0.6   ALKPHOS 91   AST 17   ALT 10     Lab Results   Component Value Date/Time    LABAMPH NOT DETECTED 12/16/2022 01:47 PM    LABAMPH NOT DETECTED 09/13/2011 09:10 PM    BARBSCNU NOT DETECTED 12/16/2022 01:47 PM    LABBENZ NOT DETECTED 12/16/2022 01:47 PM    LABBENZ NOT DETECTED 09/13/2011 09:10 PM    CANNAB NOT DETECTED 09/13/2011 09:10 PM    LABMETH NOT DETECTED 12/16/2022 01:47 PM    OPIATESCREENURINE NOT DETECTED 12/16/2022 01:47 PM    PHENCYCLIDINESCREENURINE NOT DETECTED 12/16/2022 01:47 PM    ETOH <10 12/16/2022 02:00 PM     Lab Results   Component Value Date/Time    TSH 2.320 12/16/2022 02:00 PM     Lab Results   Component Value Date    LITHIUM 0.13 (L) 08/10/2018     No results found for: VALPROATE, CBMZ        Treatment Plan:  Reviewed current Medications with the patient. Risks, benefits, side effects, drug-to-drug interactions and alternatives to treatment were discussed. Collateral information:   CD evaluation  Encourage patient to attend group and other milieu activities.   Discharge planning discussed with the patient and treatment team.    Continue Celexa 10 mg daily    PSYCHOTHERAPY/COUNSELING:  [x] Therapeutic interview  [x] Supportive  [] CBT  [] Ongoing  [] Other    [x] Patient continues to need, on a daily basis, active treatment furnished directly by or requiring the supervision of inpatient psychiatric personnel      Anticipated Length of stay: 3 to 7 days based on stability            Electronically signed by RITA Campos CNP on 53/51/9885 at 11:44 AM

## 2022-12-19 PROCEDURE — 6370000000 HC RX 637 (ALT 250 FOR IP): Performed by: NURSE PRACTITIONER

## 2022-12-19 PROCEDURE — 6370000000 HC RX 637 (ALT 250 FOR IP): Performed by: PSYCHIATRY & NEUROLOGY

## 2022-12-19 PROCEDURE — 1240000000 HC EMOTIONAL WELLNESS R&B

## 2022-12-19 PROCEDURE — 99232 SBSQ HOSP IP/OBS MODERATE 35: CPT | Performed by: NURSE PRACTITIONER

## 2022-12-19 RX ADMIN — HYDROXYZINE PAMOATE 50 MG: 50 CAPSULE ORAL at 20:42

## 2022-12-19 RX ADMIN — CITALOPRAM HYDROBROMIDE 10 MG: 20 TABLET ORAL at 11:21

## 2022-12-19 RX ADMIN — MELATONIN 3 MG ORAL TABLET 3 MG: 3 TABLET ORAL at 20:42

## 2022-12-19 NOTE — GROUP NOTE
Group Therapy Note    Date: 12/19/2022    Group Start Time: 1330  Group End Time: 1400  Group Topic: Cognitive Skills    [facility]    Thankful YOU García, RONNI        Group Therapy Note    Attendees: 8       Patient's Goal:  Pt attended community support. They also learned \"How to Apologize. \"     Notes:  Pt identified their daily goal as, \"go to group. \"    Status After Intervention:  Improved    Participation Level: Active Listener and Interactive    Participation Quality: Appropriate, Attentive, and Sharing      Speech:  normal      Thought Process/Content: Logical      Affective Functioning: Congruent      Mood: euthymic      Level of consciousness:  Alert and Attentive      Response to Learning: Able to verbalize current knowledge/experience and Able to verbalize/acknowledge new learning      Endings: None Reported    Modes of Intervention: Education, Support, Socialization, Exploration, Clarifying, and Problem-solving      Discipline Responsible: /Counselor      Signature:   YOU Medina, RONNI

## 2022-12-19 NOTE — GROUP NOTE
Group Therapy Note    Date: 12/19/2022    Group Start Time: 1500  Group End Time: 6670  Group Topic: Cognitive Skills    [facility]  Av[address] YOU Tracy, W        Group Therapy Note    Attendees: 11       Patient's Goal:  Pt will be able to identify new habits they would like to incorporate into making new goals for themselves. Notes:  Pt participated in group and made connections. Status After Intervention:  Improved    Participation Level:  Active Listener    Participation Quality: Appropriate and Attentive      Speech:  normal      Thought Process/Content: Logical      Affective Functioning: Congruent      Mood: anxious      Level of consciousness:  Alert and Oriented x4      Response to Learning: Able to verbalize current knowledge/experience, Able to verbalize/acknowledge new learning, and Able to retain information      Endings: None Reported    Modes of Intervention: Education, Support, Socialization, Exploration, Clarifying, and Problem-solving      Discipline Responsible: /Counselor      Signature:  YOU Sneed, [place]

## 2022-12-19 NOTE — PLAN OF CARE
Problem: Self Harm/Suicidality  Goal: Will have no self-injury during hospital stay  Description: INTERVENTIONS:  1. Ensure constant observer at bedside with Q15M safety checks  2. Maintain a safe environment  3. Secure patient belongings  4. Ensure family/visitors adhere to safety recommendations  5. Ensure safety tray has been added to patient's diet order  6. Every shift and PRN: Re-assess suicidal risk via Frequent Screener    12/19/2022 1413 by Yousif Walter RN  Outcome: Progressing     Problem: Depression  Goal: Will be euthymic at discharge  Description: INTERVENTIONS:  1. Administer medication as ordered  2. Provide emotional support via 1:1 interaction with staff  3. Encourage involvement in milieu/groups/activities  4. Monitor for social isolation  12/19/2022 1413 by Yousif Walter RN  Outcome: Progressing     Problem: Behavior  Goal: Pt/Family maintain appropriate behavior and adhere to behavioral management agreement, if implemented  Description: INTERVENTIONS:  1. Assess patient/family's coping skills and  non-compliant behavior (including use of illegal substances)  2. Notify security of behavior or suspected illegal substances which indicate the need for search of the family and/or belongings  3. Encourage verbalization of thoughts and concerns in a socially appropriate manner  4. Utilize positive, consistent limit setting strategies supporting safety of patient, staff and others  5. Encourage participation in the decision making process about the behavioral management agreement  6. If a visitor's behavior poses a threat to safety call refer to organization policy. 7. Initiate consult with , Psychosocial CNS, Spiritual Care as appropriate  12/19/2022 1413 by Yousif Walter RN  Outcome: Progressing     Pt denies SI/HI and AVH. He does endorse feeling depressed but also feeling better. Lebron Frank has been on the unit watching TV and is med compliant. Will continue to monitor.

## 2022-12-19 NOTE — PLAN OF CARE
Problem: Self Harm/Suicidality  Goal: Will have no self-injury during hospital stay  Description: INTERVENTIONS:  1. Ensure constant observer at bedside with Q15M safety checks  2. Maintain a safe environment  3. Secure patient belongings  4. Ensure family/visitors adhere to safety recommendations  5. Ensure safety tray has been added to patient's diet order  6. Every shift and PRN: Re-assess suicidal risk via Frequent Screener    Outcome: Progressing     Problem: Depression  Goal: Will be euthymic at discharge  Description: INTERVENTIONS:  1. Administer medication as ordered  2. Provide emotional support via 1:1 interaction with staff  3. Encourage involvement in milieu/groups/activities  4. Monitor for social isolation  Outcome: Progressing     Problem: Behavior  Goal: Pt/Family maintain appropriate behavior and adhere to behavioral management agreement, if implemented  Description: INTERVENTIONS:  1. Assess patient/family's coping skills and  non-compliant behavior (including use of illegal substances)  2. Notify security of behavior or suspected illegal substances which indicate the need for search of the family and/or belongings  3. Encourage verbalization of thoughts and concerns in a socially appropriate manner  4. Utilize positive, consistent limit setting strategies supporting safety of patient, staff and others  5. Encourage participation in the decision making process about the behavioral management agreement  6. If a visitor's behavior poses a threat to safety call refer to organization policy. 7. Initiate consult with , Psychosocial CNS, Spiritual Care as appropriate  Outcome: Progressing     Problem: Involuntary Admit  Goal: Will cooperate with staff recommendations and doctor's orders and will demonstrate appropriate behavior  Description: INTERVENTIONS:  1. Treat underlying conditions and offer medication as ordered  2.  Educate regarding involuntary admission procedures and rules  3. Contain excessive/inappropriate behavior per unit and hospital policies  Outcome: Progressing   Pt currently denies SI stating \"not today\", as well as denies and AVH. Pt does state that he is anxious and \"always depressed\". Pt was calm and cooperative and medication compliant. Pt is currently resting in bed with eyes closed and even, unlabored respirations noted. Q 15 min rounds continue.

## 2022-12-19 NOTE — PROGRESS NOTES
[facility]  Day 3 Interdisciplinary Treatment Plan NOTE    Review Date & Time: 12/19/2022 0900    Patient was in treatment team    Estimated Length of Stay Update:  3-5  Estimated Discharge Date Update: 12/21/2022    EDUCATION:   Learner Progress Toward Treatment Goals: Reviewed results and recommendations of this team    Method: Small group    Outcome: Verbalized understanding    PATIENT GOALS: None at this time. PLAN/TREATMENT RECOMMENDATIONS UPDATE: Encourage patient to attend and participate in groups. Take medication as prescribed. GOALS UPDATE:   Time frame for Short-Term Goals: Prior to discharge.       Zoila Cruz RN

## 2022-12-19 NOTE — PROGRESS NOTES
BEHAVIORAL HEALTH FOLLOW-UP NOTE     12/19/2022     Patient was seen and examined in person, Chart reviewed   Patient's case discussed with staff/team    Chief Complaint: \" I have had this problem my whole life\"    Interim History: Patient seen in his room this morning,  He states that he is not  He states that he is  not able to return home now after talking to the wife and that he is interested in transferring to [facility]. He denies suicidal homicidal ideations intent or plan denies auditory or visual hallucinations seems somewhat more relaxed on the unit today. He states that he has had had 3 serious suicide attempts in the last 3 years. He states that he has always struggled with this.        Appetite: [x] Normal/Unchanged  [] Increased  [] Decreased      Sleep:       [x] Normal/Unchanged  [] Fair       [] Poor              Energy:    [x] Normal/Unchanged  [] Increased  [] Decreased        SI [] Present  [x] Absent    HI  []Present  [x] Absent     Aggression:  [] yes  [x] no    Patient is [x] able  [] unable to CONTRACT FOR SAFETY     PAST MEDICAL/PSYCHIATRIC HISTORY:   Past Medical History:   Diagnosis Date    Cancer ([facility] [address]     Depression     Major depressive disorder, recurrent episode, severe ([facility] [address] 9/14/2011    MDD (major depressive disorder), recurrent severe, without psychosis ([facility] [address]     Overactive bladder 9/14/2011    Prostate cancer ([facility] [address]     Suicidal ideations     Urinary incontinence        FAMILY/SOCIAL HISTORY:  Family History   Problem Relation Age of Onset    Cancer Sister     Depression Sister     Cancer Brother     Depression Brother      Social History     Socioeconomic History    Marital status:      Spouse name: Not on file    Number of children: 0    Years of education: Not on file    Highest education level: Not on file   Occupational History    Not on file   Tobacco Use    Smoking status: Never    Smokeless 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    Sexual activity: Yes   Other Topics Concern    Not on file   Social History Narrative    Not on file     Social Determinants of Health     Financial Resource Strain: Not on file   Food Insecurity: Not on file   Transportation Needs: Not on file   Physical Activity: Not on file   Stress: Not on file   Social Connections: Not on file   Intimate Partner Violence: Not on file   Housing Stability: Not on file           ROS:  [x] All negative/unchanged except if checked.  Explain positive(checked items) below:  [] Constitutional  [] Eyes  [] Ear/Nose/Mouth/Throat  [] Respiratory  [] CV  [] GI  []   [] Musculoskeletal  [] Skin/Breast  [] Neurological  [] Endocrine  [] Heme/Lymph  [] Allergic/Immunologic    Explanation:     MEDICATIONS:    Current Facility-Administered Medications:     citalopram (CELEXA) tablet 10 mg, 10 mg, Oral, Daily, RITA Galan - CNP, 10 mg at 12/18/22 1570    acetaminophen (TYLENOL) tablet 650 mg, 650 mg, Oral, Q4H PRN, Madie Zhou MD    magnesium hydroxide (MILK OF MAGNESIA) 400 MG/5ML suspension 30 mL, 30 mL, Oral, Daily PRN, Madie Zhou MD    nicotine (NICODERM CQ) 21 MG/24HR 1 patch, 1 patch, TransDERmal, Daily, Madie Zhou MD    aluminum & magnesium hydroxide-simethicone (MAALOX) 200-200-20 MG/5ML suspension 30 mL, 30 mL, Oral, PRN, Madie Zhou MD    hydrOXYzine pamoate (VISTARIL) capsule 50 mg, 50 mg, Oral, TID PRN, Madie Zhou MD, 50 mg at 12/18/22 2211    haloperidol (HALDOL) tablet 3 mg, 3 mg, Oral, Q6H PRN **OR** haloperidol lactate (HALDOL) injection 3 mg, 3 mg, IntraMUSCular, Q6H PRN, Madie Zhou MD    melatonin tablet 3 mg, 3 mg, Oral, Nightly, Madie Zohu MD, 3 mg at 12/18/22 2210      Examination:  /72   Pulse 75   Temp 98.2 °F (36.8 °C) (Infrared)   Resp 16   Ht 6' 2\" (1.88 m)   Wt 170 lb (77.1 kg)   SpO2 99%   BMI 21.83 kg/m² Gait - steady  Medication side effects(SE):     Mental Status Examination:    Level of consciousness:  within normal limits   Appearance:  fair grooming and fair hygiene  Behavior/Motor:  no abnormalities noted  Attitude toward examiner:  cooperative  Speech:  spontaneous, normal rate and normal volume   Mood: \" I am feeling good. \"  Affect: Bright appropriate and pleasant  Thought processes: Linear thought flight of ideas loose associations  Thought content: Devoid of any auditory visual hallucinations delusions or other perceptual normalities.   Denies SI/HI intent or plan   Language: able to name objects and repeate phrases  Remote Memory: intact  Recent Memory: intact  Cognition:  oriented to person, place, and time   Fund of Knowledge: Vocabulary intact, pt is aware of current events and past history  Attetion and Concentration intact  Insight fair   Judgement fair       ASSESSMENT: Patient symptoms are:  [] Well controlled  [x] Improving  [] Worsening  [] No change      Diagnosis:  Principal Problem:    MDD (major depressive disorder), recurrent severe, without psychosis ([facility] [address]  Active Problems:    Cluster B personality disorder in adult [facility])    Depression with suicidal ideation  Resolved Problems:    Major depressive disorder, single episode, unspecified      LABS:    Recent Labs     12/16/22  1400   WBC 7.1   HGB 13.7        Recent Labs     12/16/22  1400      K 4.2      CO2 25   BUN 12   CREATININE 1.2   GLUCOSE 112*     Recent Labs     12/16/22  1400   BILITOT 0.6   ALKPHOS 91   AST 17   ALT 10     Lab Results   Component Value Date/Time    LABAMPH NOT DETECTED 12/16/2022 01:47 PM    LABAMPH NOT DETECTED 09/13/2011 09:10 PM    BARBSCNU NOT DETECTED 12/16/2022 01:47 PM    LABBENZ NOT DETECTED 12/16/2022 01:47 PM    LABBENZ NOT DETECTED 09/13/2011 09:10 PM    CANNAB NOT DETECTED 09/13/2011 09:10 PM    LABMETH NOT DETECTED 12/16/2022 01:47 PM    OPIATESCREENURINE NOT DETECTED 12/16/2022 01:47 PM    PHENCYCLIDINESCREENURINE NOT DETECTED 12/16/2022 01:47 PM    ETOH <10 12/16/2022 02:00 PM     Lab Results   Component Value Date/Time    TSH 2.320 12/16/2022 02:00 PM     Lab Results   Component Value Date    LITHIUM 0.13 (L) 08/10/2018     No results found for: VALPROATE, CBMZ        Treatment Plan:  Reviewed current Medications with the patient. Risks, benefits, side effects, drug-to-drug interactions and alternatives to treatment were discussed. Collateral information:   CD evaluation  Encourage patient to attend group and other milieu activities.   Discharge planning discussed with the patient and treatment team.    Continue Celexa 10 mg daily    PSYCHOTHERAPY/COUNSELING:  [x] Therapeutic interview  [x] Supportive  [] CBT  [] Ongoing  [] Other    [x] Patient continues to need, on a daily basis, active treatment furnished directly by or requiring the supervision of inpatient psychiatric personnel      Anticipated Length of stay: 3 to 7 days based on stability            Electronically signed by RITA Alexis CNP on 12/19/2022 at 8:52 AM

## 2022-12-20 PROCEDURE — 6370000000 HC RX 637 (ALT 250 FOR IP): Performed by: NURSE PRACTITIONER

## 2022-12-20 PROCEDURE — 1240000000 HC EMOTIONAL WELLNESS R&B

## 2022-12-20 PROCEDURE — 6370000000 HC RX 637 (ALT 250 FOR IP): Performed by: PSYCHIATRY & NEUROLOGY

## 2022-12-20 RX ADMIN — HYDROXYZINE PAMOATE 50 MG: 50 CAPSULE ORAL at 21:48

## 2022-12-20 RX ADMIN — MELATONIN 3 MG ORAL TABLET 3 MG: 3 TABLET ORAL at 21:48

## 2022-12-20 RX ADMIN — CITALOPRAM HYDROBROMIDE 10 MG: 20 TABLET ORAL at 09:56

## 2022-12-20 ASSESSMENT — PAIN SCALES - GENERAL
PAINLEVEL_OUTOF10: 0
PAINLEVEL_OUTOF10: 0

## 2022-12-20 NOTE — CARE COORDINATION
Pt states that he would like to be transferred to [facility] if possible. JOHNIE called [address] [employer] [phone] and requested to speak with the transfer center. JOHNIE MELTON.

## 2022-12-20 NOTE — PLAN OF CARE
Denies SI/HI  denies hallucinations  preoccupied  out on the unit but stays to self   cooperative with meds  will continue to monitor

## 2022-12-20 NOTE — PLAN OF CARE
Problem: Depression  Goal: Will be euthymic at discharge  Description: INTERVENTIONS:  1. Administer medication as ordered  2. Provide emotional support via 1:1 interaction with staff  3. Encourage involvement in milieu/groups/activities  4. Monitor for social isolation  12/19/2022 2142 by Neeraj Campos RN  Outcome: Progressing     Problem: Behavior  Goal: Pt/Family maintain appropriate behavior and adhere to behavioral management agreement, if implemented  Description: INTERVENTIONS:  1. Assess patient/family's coping skills and  non-compliant behavior (including use of illegal substances)  2. Notify security of behavior or suspected illegal substances which indicate the need for search of the family and/or belongings  3. Encourage verbalization of thoughts and concerns in a socially appropriate manner  4. Utilize positive, consistent limit setting strategies supporting safety of patient, staff and others  5. Encourage participation in the decision making process about the behavioral management agreement  6. If a visitor's behavior poses a threat to safety call refer to organization policy. 7. Initiate consult with , Psychosocial CNS, Spiritual Care as appropriate  12/19/2022 2142 by Neeraj Campos RN  Outcome: Progressing     Patient has been out on the unit watching tv. Calm and cooperative during conversation. Affect is flat, mood is depressed. Rates depression 8/10. When asked about possible suicidal ideations, patient stated that depression and suicidal thoughts are \"just a part of my life\". Currently denies suicidal ideations, though. Denies homicidal ideations and hallucinations. Purposeful rounding continued.

## 2022-12-20 NOTE — CARE COORDINATION
SW left another VM at [facility] to see if pt would be able to be transferred to their facility.

## 2022-12-20 NOTE — PROGRESS NOTES
Attended morning community meeting. Updated on staffing and daily expectations. Shared goal or the day as to communicate with others.

## 2022-12-20 NOTE — PROGRESS NOTES
[facility] FOLLOW-UP NOTE     12/20/2022     Patient was seen and examined in person, Chart reviewed   Patient's case discussed with staff/team    Chief Complaint: Sleeping soundly in room after breakfast    Interim History: Patient seen in his room this morning sleeping soundly in his room after breakfast.     Appetite: [x] Normal/Unchanged  [] Increased  [] Decreased      Sleep:       [x] Normal/Unchanged  [] Fair       [] Poor              Energy:    [x] Normal/Unchanged  [] Increased  [] Decreased        SI [] Present  [x] Absent    HI  []Present  [x] Absent     Aggression:  [] yes  [x] no    Patient is [x] able  [] unable to CONTRACT FOR SAFETY     PAST MEDICAL/PSYCHIATRIC HISTORY:   Past Medical History:   Diagnosis Date    [facility] ([facility]     Depression     Major depressive disorder, recurrent episode, severe ([facility] 9/14/2011    MDD (major depressive disorder), recurrent severe, without psychosis ([facility]     Overactive bladder 9/14/2011    Prostate cancer ([facility]     Suicidal ideations     Urinary incontinence        FAMILY/SOCIAL HISTORY:  Family History   Problem Relation Age of Onset    Cancer Sister     Depression Sister     Cancer Brother     Depression Brother      Social History     Socioeconomic History    Marital status:      Spouse name: Not on file    Number of children: 0    Years of education: Not on file    Highest education level: Not on file   Occupational History    Not on file   Tobacco Use    Smoking status: Never    Smokeless tobacco: Never   Vaping Use    Vaping Use: Never used   Substance and Sexual Activity    Alcohol use: No     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 Explain positive(checked items) below:  [] Constitutional  [] Eyes  [] Ear/Nose/Mouth/Throat  [] Respiratory  [] CV  [] GI  []   [] Musculoskeletal  [] Skin/Breast  [] Neurological  [] Endocrine  [] Heme/Lymph  [] Allergic/Immunologic    Explanation:     MEDICATIONS:    Current Facility-Administered Medications:     citalopram (CELEXA) tablet 10 mg, 10 mg, Oral, Daily, Annmarie Heath, APRN - CNP, 10 mg at 12/20/22 1573    acetaminophen (TYLENOL) tablet 650 mg, 650 mg, Oral, Q4H PRN, Osei Webb MD    magnesium hydroxide (MILK OF MAGNESIA) 400 MG/5ML suspension 30 mL, 30 mL, Oral, Daily PRN, Osei Webb MD    nicotine (NICODERM CQ) 21 MG/24HR 1 patch, 1 patch, TransDERmal, Daily, Osei Webb MD    aluminum & magnesium hydroxide-simethicone (MAALOX) 200-200-20 MG/5ML suspension 30 mL, 30 mL, Oral, PRN, Osei eWbb MD    hydrOXYzine pamoate (VISTARIL) capsule 50 mg, 50 mg, Oral, TID PRN, Osei Webb MD, 50 mg at 12/19/22 2042    haloperidol (HALDOL) tablet 3 mg, 3 mg, Oral, Q6H PRN **OR** haloperidol lactate (HALDOL) injection 3 mg, 3 mg, IntraMUSCular, Q6H PRN, Osei Webb MD    melatonin tablet 3 mg, 3 mg, Oral, Nightly, Osei Webb MD, 3 mg at 12/19/22 2042      Examination:  /64   Pulse 82   Temp 97.7 °F (36.5 °C) (Temporal)   Resp 16   Ht 6' 2\" (1.88 m)   Wt 170 lb (77.1 kg)   SpO2 99%   BMI 21.83 kg/m²   Gait - steady  Medication side effects(SE):     Mental Status Examination:    Level of consciousness:  within normal limits   Appearance:  fair grooming and fair hygiene  Behavior/Motor:  no abnormalities noted  Attitude toward examiner:  cooperative  Speech:  spontaneous, normal rate and normal volume   Mood: \" I am feeling good. \"  Affect: Bright appropriate and pleasant  Thought interactions and alternatives to treatment were discussed. Collateral information:   CD evaluation  Encourage patient to attend group and other milieu activities.   Discharge planning discussed with the patient and treatment team.    Continue Celexa 10 mg daily    PSYCHOTHERAPY/COUNSELING:  [x] Therapeutic interview  [x] Supportive  [] CBT  [] Ongoing  [] Other    [x] Patient continues to need, on a daily basis, active treatment furnished directly by or requiring the supervision of inpatient psychiatric personnel      Anticipated Length of stay: 3 to 7 days based on stability            Electronically signed by Teddie Bloch, APRN - CNP on 12/20/2022 at 12:47 PM

## 2022-12-20 NOTE — GROUP NOTE
Group Therapy Note    Date: 12/20/2022    Group Start Time: 4993  Group End Time: 1140  Group Topic: Psychoeducation    [facility]    Ramos Shine, [address]                                                                        Group Therapy Note    Date: 12/20/2022    Wellness Binder Information  Module Name:  Creating a Healthy Routine  Patient's Goal: Patient will be able to id small changes that can add up to staying mentally well. Notes:  Pleasant and sharing in group, able to share when prompted. Willing to accept handout after group. Status After Intervention:  Improved    Participation Level:  Active Listener and Interactive    Participation Quality: Appropriate, Attentive, and Sharing      Speech:  normal      Thought Process/Content: Logical      Affective Functioning: Congruent      Mood: euthymic      Level of consciousness:  Alert, Oriented x4, and Attentive      Response to Learning: Able to verbalize/acknowledge new learning, Able to retain information, and Progressing to goal      Endings: None Reported    Modes of Intervention: Education, Support, Socialization, Exploration, and Problem-solving      Discipline Responsible: Psychoeducational Specialist      Signature:  PAULY Anderson

## 2022-12-21 VITALS
OXYGEN SATURATION: 97 % | WEIGHT: 170 LBS | SYSTOLIC BLOOD PRESSURE: 124 MMHG | RESPIRATION RATE: 18 BRPM | BODY MASS INDEX: 21.82 KG/M2 | TEMPERATURE: 98.2 F | HEIGHT: 74 IN | HEART RATE: 67 BPM | DIASTOLIC BLOOD PRESSURE: 66 MMHG

## 2022-12-21 PROCEDURE — 6370000000 HC RX 637 (ALT 250 FOR IP): Performed by: NURSE PRACTITIONER

## 2022-12-21 RX ORDER — LANOLIN ALCOHOL/MO/W.PET/CERES
3 CREAM (GRAM) TOPICAL NIGHTLY
Refills: 0 | COMMUNITY
Start: 2022-12-21

## 2022-12-21 RX ORDER — CITALOPRAM 10 MG/1
10 TABLET ORAL DAILY
Qty: 30 TABLET | Refills: 0 | Status: SHIPPED | OUTPATIENT
Start: 2022-12-21 | End: 2023-01-20

## 2022-12-21 RX ADMIN — CITALOPRAM HYDROBROMIDE 10 MG: 20 TABLET ORAL at 08:55
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
## 2022-12-21 NOTE — PROGRESS NOTES
Attended community meeting. Updated on staffing and daily expectations. Shared goal for the day as work out the details to my next step.
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
## 2022-12-21 NOTE — CARE COORDINATION
JOHNIE spoke with Rekha Will at [facility]. Rekha Will states that he is going to send a referral for this pt to fill out, and will need this  to attach the hospital's H&P. JOHNIE to fax this information back to Rekha Will at [phone]. Rekha Will states that it is unlikely this pt will be admitted prior to the weekend.  JOHNIE will discuss this with the pt and treatment team.

## 2022-12-21 NOTE — PROGRESS NOTES
5 [facility]  Discharge Note  Pt denies SI/HI and AVH. He is ready to return home and feels much better. Pt discharged with followings belongings:   Dental Appliances: None  Vision - Corrective Lenses: Eyeglasses  Hearing Aid: None  Jewelry: None  Body Piercings Removed: N/A  Clothing: Shirt, Footwear, Pants, Jacket/Coat  Other Valuables:  ($82 in cash, 1 bankcard, 3 miscellaneous ID cards)   Valuables sent home with patient. Patient educated on aftercare instructions: yes  Patient verbalize understanding of AVS:  yes. Status EXAM upon discharge:  Mental Status and Behavioral Exam  Normal: No  Level of Assistance: Independent/Self  Facial Expression: Flat, Sad  Affect: Congruent  Level of Consciousness: Alert  Frequency of Checks: 4 times per hour, close  Mood:Normal: No  Mood: Anxious  Motor Activity:Normal: No  Motor Activity: Decreased  Eye Contact: Fair  Observed Behavior: Cooperative  Sexual Misconduct History: Current - no  Preception: [place] to person, [place] to time, [place] to place, [place] to situation  Attention:Normal: No  Attention: Distractible  Thought Processes: Circumstantial  Thought Content:Normal: No  Thought Content: Poverty of content  Depression Symptoms: Isolative, Loss of interest  Anxiety Symptoms: Generalized  Maribeth Symptoms: No problems reported or observed. Hallucinations: None  Delusions: No  Memory:Normal: No  Memory: Poor recent  Insight and Judgment: No  Insight and Judgment: Poor judgment, Poor insight    Tobacco Screening:  Practical Counseling, on admission, sarah X, if applicable and completed (first 3 are required if patient doesn't refuse):             ( X) Recognizing danger situations (included triggers and roadblocks)                    ( X) Coping skills (new ways to manage stress,relaxation techniques, changing routine, distraction)                                                           ( X) Basic information about quitting (benefits of quitting, techniques in how to quit, available resources  ( ) Referral for counseling faxed to [facility]                                                                                                                   ( ) Patient refused counseling  (X ) Patient refused referral  ( X) Patient refused prescription upon discharge  ( ) Patient has not smoked in the last 30 days    Metabolic Screening:    Lab Results   Component Value Date    LABA1C 5.2 12/16/2022       Lab Results   Component Value Date    CHOL 227 (H) 12/16/2022    CHOL 227 (H) 09/11/2017    CHOL 220 (H) 06/22/2017     Lab Results   Component Value Date    TRIG 143 12/16/2022    TRIG 118 09/11/2017    TRIG 204 (H) 06/22/2017     Lab Results   Component Value Date    HDL 41 12/16/2022    HDL 40 09/11/2017    HDL 30 06/22/2017     No components found for: [facility]  Lab Results   Component Value Date    LABVLDL 29 12/16/2022    LABVLDL 24 09/11/2017    LABVLDL 41 06/22/2017       Lyndsey Carl RN

## 2022-12-21 NOTE — PLAN OF CARE
Patient denies SI/HI/AVH. Reports anxiety 5/10, depression 7/10 and stated \"I've been depressed my whole life and in and out of hospitals . I'm trying to get into the [place] [facility] in [place]" A&O x 4 and is blunt, flat and preoccupied,  observed in day area. Medication compliant. No sxs of distress noted. Staff will continue to provide support and interventions when needed or requested. Purposeful rounds continued Q 15 minutes. Problem: Depression  Goal: Will be euthymic at discharge  Description: INTERVENTIONS:  1. Administer medication as ordered  2. Provide emotional support via 1:1 interaction with staff  3. Encourage involvement in milieu/groups/activities  4. Monitor for social isolation  Outcome: Progressing     Problem: Behavior  Goal: Pt/Family maintain appropriate behavior and adhere to behavioral management agreement, if implemented  Description: INTERVENTIONS:  1. Assess patient/family's coping skills and  non-compliant behavior (including use of illegal substances)  2. Notify security of behavior or suspected illegal substances which indicate the need for search of the family and/or belongings  3. Encourage verbalization of thoughts and concerns in a socially appropriate manner  4. Utilize positive, consistent limit setting strategies supporting safety of patient, staff and others  5. Encourage participation in the decision making process about the behavioral management agreement  6. If a visitor's behavior poses a threat to safety call refer to organization policy. 7. Initiate consult with , Psychosocial CNS, Spiritual Care as appropriate  Outcome: Progressing     Problem: Involuntary Admit  Goal: Will cooperate with staff recommendations and doctor's orders and will demonstrate appropriate behavior  Description: INTERVENTIONS:  1. Treat underlying conditions and offer medication as ordered  2. Educate regarding involuntary admission procedures and rules  3.  Contain excessive/inappropriate behavior per unit and hospital policies  Outcome: Progressing

## 2022-12-21 NOTE — GROUP NOTE
Group Therapy Note    Date: 12/21/2022    Group Start Time: 1030  Group End Time: 4813  Group Topic: Psychoeducation    [facility] ACUTE BH 1    Pam [place]                                                                        Group Therapy Note    Date: 12/21/2022  Wellness Binder Information  Module Name:  keep your mind grounded     Patient's Goal:  Patient will be able to id how to stay grounded physically when feeling out of control    Notes:  Pleasant and sharing when prompted, able to share what scents are appeasing to patient and scenery. Status After Intervention:  Improved    Participation Level:  Active Listener and Interactive    Participation Quality: Appropriate, Attentive, and Sharing      Speech:  normal      Thought Process/Content: Logical      Affective Functioning: Congruent      Mood: euphoric      Level of consciousness:  Alert, Oriented x4, and Attentive      Response to Learning: Able to verbalize/acknowledge new learning, Able to retain information, and Progressing to goal      Endings: None Reported    Modes of Intervention: Education, Support, Socialization, Exploration, and Problem-solving      Discipline Responsible: Psychoeducational Specialist      Signature:  Emerson Mclain

## 2022-12-21 NOTE — PLAN OF CARE
Alert and oriented x4 he is cooperative with flat and sad affect. He isolates to his room not social with peers. He denies SI/HI does not endorse auditory or visual hallucinations. He is taking his meds and attending groups. He is not having any behavior concerns and eating meals. Will continue to monitor with safety rounds. Problem: Self Harm/Suicidality  Goal: Will have no self-injury during hospital stay  Description: INTERVENTIONS:  1. Ensure constant observer at bedside with Q15M safety checks  2. Maintain a safe environment  3. Secure patient belongings  4. Ensure family/visitors adhere to safety recommendations  5. Ensure safety tray has been added to patient's diet order  6. Every shift and PRN: Re-assess suicidal risk via Frequent Screener    12/21/2022 1158 by Yury Gay RN  Outcome: Progressing     Problem: Depression  Goal: Will be euthymic at discharge  Description: INTERVENTIONS:  1. Administer medication as ordered  2. Provide emotional support via 1:1 interaction with staff  3. Encourage involvement in milieu/groups/activities  4.  Monitor for social isolation  12/21/2022 1158 by Yury Gay RN  Outcome: Progressing

## 2022-12-21 NOTE — PLAN OF CARE
Problem: Self Harm/Suicidality  Goal: Will have no self-injury during hospital stay  Description: INTERVENTIONS:  1. Ensure constant observer at bedside with Q15M safety checks  2. Maintain a safe environment  3. Secure patient belongings  4. Ensure family/visitors adhere to safety recommendations  5. Ensure safety tray has been added to patient's diet order  6. Every shift and PRN: Re-assess suicidal risk via Frequent Screener    12/21/2022 1444 by Mina Fagan RN  Outcome: Completed     Problem: Depression  Goal: Will be euthymic at discharge  Description: INTERVENTIONS:  1. Administer medication as ordered  2. Provide emotional support via 1:1 interaction with staff  3. Encourage involvement in milieu/groups/activities  4. Monitor for social isolation  12/21/2022 1444 by Mina Fagan RN  Outcome: Completed     Problem: Behavior  Goal: Pt/Family maintain appropriate behavior and adhere to behavioral management agreement, if implemented  Description: INTERVENTIONS:  1. Assess patient/family's coping skills and  non-compliant behavior (including use of illegal substances)  2. Notify security of behavior or suspected illegal substances which indicate the need for search of the family and/or belongings  3. Encourage verbalization of thoughts and concerns in a socially appropriate manner  4. Utilize positive, consistent limit setting strategies supporting safety of patient, staff and others  5. Encourage participation in the decision making process about the behavioral management agreement  6. If a visitor's behavior poses a threat to safety call refer to organization policy. 7. Initiate consult with , Psychosocial CNS, Spiritual Care as appropriate  Outcome: Completed     Problem: Involuntary Admit  Goal: Will cooperate with staff recommendations and doctor's orders and will demonstrate appropriate behavior  Description: INTERVENTIONS:  1.  Treat underlying conditions and offer medication as ordered  2. Educate regarding involuntary admission procedures and rules  3.  Contain excessive/inappropriate behavior per unit and hospital policies  Outcome: Completed     Problem: Pain  Goal: Verbalizes/displays adequate comfort level or baseline comfort level  Outcome: Completed

## 2022-12-21 NOTE — CARE COORDINATION
SW met with this pt to discuss discharge. Pt observed laying down, resting in bed. Pt states that he is ready to be discharged. Pt requesting this  look into [facility], a [facility] homeless [facility] in [place]. Pt states that he has stayed here in the past, and they were able to assist him. Pt appears flat, but brightens with conversation. Pt's eye-contact is good. Pt appears mostly linear and logical. Pt is currently denying SI/ HI/ hallucinations/ delusions. SW called [facility] and spoke with Yasmin. Linda Campuzanolu states that they have bed availability, and they remember this pt from past visits. Linda Lorenza states that she will have Taiwo Lr, a counselor at [facility], reach out to this  to discuss the intake process. SW will await a call from Taiwo Lr.

## 2022-12-21 NOTE — PROGRESS NOTES
Patient resting quietly to self, respirations are even and unlabored. No sxs of distress or discomfort noted. No PRN medications administered this shift. Purposeful rounds continued Q 15 minutes to ensure the safety of the patient while on the unit.

## 2023-05-05 ENCOUNTER — APPOINTMENT (OUTPATIENT)
Dept: CT IMAGING | Age: 73
End: 2023-05-05
Payer: OTHER GOVERNMENT

## 2023-05-05 ENCOUNTER — HOSPITAL ENCOUNTER (EMERGENCY)
Age: 73
Discharge: HOME | End: 2023-05-06
Attending: EMERGENCY MEDICINE
Payer: OTHER GOVERNMENT

## 2023-05-05 VITALS
OXYGEN SATURATION: 98 % | HEART RATE: 68 BPM | RESPIRATION RATE: 16 BRPM | TEMPERATURE: 98.7 F | DIASTOLIC BLOOD PRESSURE: 94 MMHG | SYSTOLIC BLOOD PRESSURE: 125 MMHG

## 2023-05-05 DIAGNOSIS — R10.12 LEFT UPPER QUADRANT ABDOMINAL PAIN: Primary | ICD-10-CM

## 2023-05-05 LAB
ALBUMIN SERPL-MCNC: 4.4 G/DL (ref 3.5–5.2)
ALP SERPL-CCNC: 94 U/L (ref 40–129)
ALT SERPL-CCNC: 9 U/L (ref 0–40)
ANION GAP SERPL CALCULATED.3IONS-SCNC: 9 MMOL/L (ref 7–16)
APAP SERPL-MCNC: <5 MCG/ML (ref 10–30)
AST SERPL-CCNC: 16 U/L (ref 0–39)
BACTERIA URNS QL MICRO: ABNORMAL /HPF
BASOPHILS # BLD: 0.03 E9/L (ref 0–0.2)
BASOPHILS NFR BLD: 0.5 % (ref 0–2)
BILIRUB DIRECT SERPL-MCNC: <0.2 MG/DL (ref 0–0.3)
BILIRUB INDIRECT SERPL-MCNC: NORMAL MG/DL (ref 0–1)
BILIRUB SERPL-MCNC: 0.6 MG/DL (ref 0–1.2)
BILIRUB UR QL STRIP: NEGATIVE
BUN SERPL-MCNC: 11 MG/DL (ref 6–23)
CALCIUM SERPL-MCNC: 9.5 MG/DL (ref 8.6–10.2)
CHLORIDE SERPL-SCNC: 103 MMOL/L (ref 98–107)
CLARITY UR: CLEAR
CO2 SERPL-SCNC: 28 MMOL/L (ref 22–29)
COLOR UR: YELLOW
CREAT SERPL-MCNC: 1.3 MG/DL (ref 0.7–1.2)
EOSINOPHIL # BLD: 0.09 E9/L (ref 0.05–0.5)
EOSINOPHIL NFR BLD: 1.4 % (ref 0–6)
ERYTHROCYTE [DISTWIDTH] IN BLOOD BY AUTOMATED COUNT: 13.7 FL (ref 11.5–15)
ETHANOLAMINE SERPL-MCNC: <10 MG/DL (ref 0–0.08)
GLUCOSE SERPL-MCNC: 87 MG/DL (ref 74–99)
GLUCOSE UR STRIP-MCNC: NEGATIVE MG/DL
HCT VFR BLD AUTO: 46.3 % (ref 37–54)
HGB BLD-MCNC: 15.1 G/DL (ref 12.5–16.5)
HGB UR QL STRIP: NEGATIVE
IMM GRANULOCYTES # BLD: 0.02 E9/L
IMM GRANULOCYTES NFR BLD: 0.3 % (ref 0–5)
KETONES UR STRIP-MCNC: NEGATIVE MG/DL
LACTATE BLDV-SCNC: 1 MMOL/L (ref 0.5–2.2)
LEUKOCYTE ESTERASE UR QL STRIP: NEGATIVE
LIPASE: 55 U/L (ref 13–60)
LYMPHOCYTES # BLD: 1.41 E9/L (ref 1.5–4)
LYMPHOCYTES NFR BLD: 21.4 % (ref 20–42)
MCH RBC QN AUTO: 30.8 PG (ref 26–35)
MCHC RBC AUTO-ENTMCNC: 32.6 % (ref 32–34.5)
MCV RBC AUTO: 94.5 FL (ref 80–99.9)
MONOCYTES # BLD: 0.64 E9/L (ref 0.1–0.95)
MONOCYTES NFR BLD: 9.7 % (ref 2–12)
MUCOUS THREADS URNS QL MICRO: PRESENT /LPF
NEUTROPHILS # BLD: 4.4 E9/L (ref 1.8–7.3)
NEUTS SEG NFR BLD: 66.7 % (ref 43–80)
NITRITE UR QL STRIP: NEGATIVE
PH UR STRIP: 5.5 [PH] (ref 5–9)
PLATELET # BLD AUTO: 214 E9/L (ref 130–450)
PMV BLD AUTO: 9.3 FL (ref 7–12)
POTASSIUM SERPL-SCNC: 4 MMOL/L (ref 3.5–5)
PROT SERPL-MCNC: 6.6 G/DL (ref 6.4–8.3)
PROT UR STRIP-MCNC: NEGATIVE MG/DL
RBC # BLD AUTO: 4.9 E12/L (ref 3.8–5.8)
RBC #/AREA URNS HPF: ABNORMAL /HPF (ref 0–2)
SALICYLATES SERPL-MCNC: <0.3 MG/DL (ref 0–30)
SODIUM SERPL-SCNC: 140 MMOL/L (ref 132–146)
SP GR UR STRIP: 1.01 (ref 1–1.03)
TRICYCLIC ANTIDEPRESSANTS SCREEN SERUM: NEGATIVE NG/ML
UROBILINOGEN UR STRIP-ACNC: 0.2 E.U./DL
WBC # BLD: 6.6 E9/L (ref 4.5–11.5)
WBC #/AREA URNS HPF: ABNORMAL /HPF (ref 0–5)

## 2023-05-05 PROCEDURE — 83605 ASSAY OF LACTIC ACID: CPT

## 2023-05-05 PROCEDURE — 74176 CT ABD & PELVIS W/O CONTRAST: CPT

## 2023-05-05 PROCEDURE — 80143 DRUG ASSAY ACETAMINOPHEN: CPT

## 2023-05-05 PROCEDURE — 6360000002 HC RX W HCPCS: Performed by: STUDENT IN AN ORGANIZED HEALTH CARE EDUCATION/TRAINING PROGRAM

## 2023-05-05 PROCEDURE — 80076 HEPATIC FUNCTION PANEL: CPT

## 2023-05-05 PROCEDURE — 80179 DRUG ASSAY SALICYLATE: CPT

## 2023-05-05 PROCEDURE — 82077 ASSAY SPEC XCP UR&BREATH IA: CPT

## 2023-05-05 PROCEDURE — 81001 URINALYSIS AUTO W/SCOPE: CPT

## 2023-05-05 PROCEDURE — 83690 ASSAY OF LIPASE: CPT

## 2023-05-05 PROCEDURE — 85025 COMPLETE CBC W/AUTO DIFF WBC: CPT

## 2023-05-05 PROCEDURE — 80307 DRUG TEST PRSMV CHEM ANLYZR: CPT

## 2023-05-05 PROCEDURE — 2580000003 HC RX 258: Performed by: STUDENT IN AN ORGANIZED HEALTH CARE EDUCATION/TRAINING PROGRAM

## 2023-05-05 PROCEDURE — 80048 BASIC METABOLIC PNL TOTAL CA: CPT

## 2023-05-05 PROCEDURE — 99284 EMERGENCY DEPT VISIT MOD MDM: CPT

## 2023-05-05 PROCEDURE — 96374 THER/PROPH/DIAG INJ IV PUSH: CPT

## 2023-05-05 RX ORDER — 0.9 % SODIUM CHLORIDE 0.9 %
1000 INTRAVENOUS SOLUTION INTRAVENOUS ONCE
Status: COMPLETED | OUTPATIENT
Start: 2023-05-05 | End: 2023-05-05

## 2023-05-05 RX ORDER — KETOROLAC TROMETHAMINE 30 MG/ML
15 INJECTION, SOLUTION INTRAMUSCULAR; INTRAVENOUS ONCE
Status: COMPLETED | OUTPATIENT
Start: 2023-05-05 | End: 2023-05-05

## 2023-05-05 RX ADMIN — KETOROLAC TROMETHAMINE 15 MG: 30 INJECTION, SOLUTION INTRAMUSCULAR; INTRAVENOUS at 17:23

## 2023-05-05 RX ADMIN — SODIUM CHLORIDE 1000 ML: 9 INJECTION, SOLUTION INTRAVENOUS at 17:25

## 2023-05-05 ASSESSMENT — PAIN DESCRIPTION - LOCATION: LOCATION: FLANK

## 2023-05-05 ASSESSMENT — PAIN DESCRIPTION - ORIENTATION: ORIENTATION: LEFT

## 2023-05-05 ASSESSMENT — PAIN SCALES - GENERAL: PAINLEVEL_OUTOF10: 5

## 2023-05-05 NOTE — ED PROVIDER NOTES
[facility] ENCOUNTER        Pt Name: Yasmin Toth  MRN: [id number]  [place] 1950  Date of evaluation: 5/5/2023  Provider: Coni Jackson MD  PCP: Aamir Hung MD  Note Started: 4:45 PM EDT 5/5/23    CHIEF COMPLAINT       Chief Complaint   Patient presents with    Abdominal Pain     X 1 week. Worse yesterday. Sent from [place]. Denies n/v    Flank Pain       HISTORY OF PRESENT ILLNESS: 1 or more Elements        Limitations to history : None    Yasmin Toth is a 67 y.o. male who presents to the ED for 1 week of left-sided flank pain. No nausea or vomiting, no dysuria or hematuria. Has been taking aspirin for it which is helped the pain. Denies any fevers or chills. Denies any chest pain, chest tightness, shortness of breath or cough. Nursing Notes were all reviewed and agreed with or any disagreements were addressed in the HPI. REVIEW OF EXTERNAL NOTE :       Multiple prior admissions for suicidal ideation and suicide attempts 12/16/2022     REVIEW OF SYSTEMS :      Positives and Pertinent negatives as per HPI.      SURGICAL HISTORY     Past Surgical History:   Procedure Laterality Date    APPENDECTOMY      BACK SURGERY      lumbar disc, and cervical vertabrae    COLONOSCOPY      OTHER SURGICAL HISTORY  02/21/2014    Myelogram    PROSTATECTOMY      TONSILLECTOMY         CURRENTMEDICATIONS       Previous Medications    CITALOPRAM (CELEXA) 10 MG TABLET    Take 1 tablet by mouth daily    MELATONIN 3 MG TABS TABLET    Take 1 tablet by mouth nightly       ALLERGIES     Ketamine    FAMILYHISTORY       Family History   Problem Relation Age of Onset    Cancer Sister     Depression Sister     Cancer Brother     Depression Brother         SOCIAL HISTORY       Social History     Tobacco Use    Smoking status: Never    Smokeless tobacco: Never   Vaping Use    Vaping Use: Never used   Substance Use Topics    Alcohol use:

## 2023-07-14 ENCOUNTER — HOSPITAL ENCOUNTER (INPATIENT)
Age: 73
LOS: 5 days | Discharge: HOME | DRG: 885 | End: 2023-07-19
Attending: EMERGENCY MEDICINE | Admitting: PSYCHIATRY & NEUROLOGY
Payer: MEDICARE

## 2023-07-14 DIAGNOSIS — R45.851 SUICIDAL IDEATION: ICD-10-CM

## 2023-07-14 DIAGNOSIS — F32.A DEPRESSION, UNSPECIFIED DEPRESSION TYPE: Primary | ICD-10-CM

## 2023-07-14 PROBLEM — F33.1 MDD (MAJOR DEPRESSIVE DISORDER), RECURRENT EPISODE, MODERATE (HCC): Status: ACTIVE | Noted: 2023-07-14

## 2023-07-14 LAB
ALBUMIN SERPL-MCNC: 4.5 G/DL (ref 3.5–5.2)
ALP SERPL-CCNC: 89 U/L (ref 40–129)
ALT SERPL-CCNC: 9 U/L (ref 0–40)
AMPHET UR QL SCN: NOT DETECTED
ANION GAP SERPL CALCULATED.3IONS-SCNC: 14 MMOL/L (ref 7–16)
APAP SERPL-MCNC: <5 MCG/ML (ref 10–30)
AST SERPL-CCNC: 18 U/L (ref 0–39)
BACTERIA URNS QL MICRO: ABNORMAL /HPF
BARBITURATES UR QL SCN: NOT DETECTED
BASOPHILS # BLD: 0.03 E9/L (ref 0–0.2)
BASOPHILS NFR BLD: 0.5 % (ref 0–2)
BENZODIAZ UR QL SCN: NOT DETECTED
BILIRUB SERPL-MCNC: 0.5 MG/DL (ref 0–1.2)
BILIRUB UR QL STRIP: NEGATIVE
BUN SERPL-MCNC: 16 MG/DL (ref 6–23)
CALCIUM SERPL-MCNC: 9.4 MG/DL (ref 8.6–10.2)
CANNABINOIDS UR QL SCN: NOT DETECTED
CHLORIDE SERPL-SCNC: 106 MMOL/L (ref 98–107)
CLARITY UR: CLEAR
CO2 SERPL-SCNC: 22 MMOL/L (ref 22–29)
COCAINE UR QL SCN: NOT DETECTED
COLOR UR: YELLOW
CREAT SERPL-MCNC: 1.4 MG/DL (ref 0.7–1.2)
CRYSTALS URNS MICRO: ABNORMAL /HPF
DRUG SCREEN COMMENT UR-IMP: NORMAL
EOSINOPHIL # BLD: 0.26 E9/L (ref 0.05–0.5)
EOSINOPHIL NFR BLD: 4.2 % (ref 0–6)
EPI CELLS #/AREA URNS HPF: ABNORMAL /HPF
ERYTHROCYTE [DISTWIDTH] IN BLOOD BY AUTOMATED COUNT: 13.3 FL (ref 11.5–15)
ETHANOLAMINE SERPL-MCNC: <10 MG/DL (ref 0–0.08)
FENTANYL SCREEN, URINE: NOT DETECTED
FLUAV RNA RESP QL NAA+PROBE: NOT DETECTED
FLUBV RNA RESP QL NAA+PROBE: NOT DETECTED
GLUCOSE SERPL-MCNC: 83 MG/DL (ref 74–99)
GLUCOSE UR STRIP-MCNC: NEGATIVE MG/DL
HCT VFR BLD AUTO: 41.8 % (ref 37–54)
HGB BLD-MCNC: 13.9 G/DL (ref 12.5–16.5)
HGB UR QL STRIP: ABNORMAL
IMM GRANULOCYTES # BLD: 0.02 E9/L
IMM GRANULOCYTES NFR BLD: 0.3 % (ref 0–5)
KETONES UR STRIP-MCNC: ABNORMAL MG/DL
LEUKOCYTE ESTERASE UR QL STRIP: NEGATIVE
LYMPHOCYTES # BLD: 1.05 E9/L (ref 1.5–4)
LYMPHOCYTES NFR BLD: 17 % (ref 20–42)
MCH RBC QN AUTO: 31.6 PG (ref 26–35)
MCHC RBC AUTO-ENTMCNC: 33.3 % (ref 32–34.5)
MCV RBC AUTO: 95 FL (ref 80–99.9)
METHADONE UR QL SCN: NOT DETECTED
MONOCYTES # BLD: 0.63 E9/L (ref 0.1–0.95)
MONOCYTES NFR BLD: 10.2 % (ref 2–12)
MUCOUS THREADS URNS QL MICRO: PRESENT /LPF
NEUTROPHILS # BLD: 4.2 E9/L (ref 1.8–7.3)
NEUTS SEG NFR BLD: 67.8 % (ref 43–80)
NITRITE UR QL STRIP: NEGATIVE
OPIATES UR QL SCN: NOT DETECTED
OXYCODONE URINE: NOT DETECTED
PCP UR QL SCN: NOT DETECTED
PH UR STRIP: 5 [PH] (ref 5–9)
PLATELET # BLD AUTO: 188 E9/L (ref 130–450)
PMV BLD AUTO: 9.3 FL (ref 7–12)
POTASSIUM SERPL-SCNC: 4.1 MMOL/L (ref 3.5–5)
PROT SERPL-MCNC: 6.9 G/DL (ref 6.4–8.3)
PROT UR STRIP-MCNC: ABNORMAL MG/DL
RBC # BLD AUTO: 4.4 E12/L (ref 3.8–5.8)
RBC #/AREA URNS HPF: ABNORMAL /HPF (ref 0–2)
SALICYLATES SERPL-MCNC: <0.3 MG/DL (ref 0–30)
SARS-COV-2 RNA RESP QL NAA+PROBE: NOT DETECTED
SODIUM SERPL-SCNC: 142 MMOL/L (ref 132–146)
SP GR UR STRIP: >=1.03 (ref 1–1.03)
TRICYCLIC ANTIDEPRESSANTS SCREEN SERUM: NEGATIVE NG/ML
UROBILINOGEN UR STRIP-ACNC: 0.2 E.U./DL
WBC # BLD: 6.2 E9/L (ref 4.5–11.5)
WBC #/AREA URNS HPF: ABNORMAL /HPF (ref 0–5)

## 2023-07-14 PROCEDURE — 80179 DRUG ASSAY SALICYLATE: CPT

## 2023-07-14 PROCEDURE — 87636 SARSCOV2 & INF A&B AMP PRB: CPT

## 2023-07-14 PROCEDURE — 82077 ASSAY SPEC XCP UR&BREATH IA: CPT

## 2023-07-14 PROCEDURE — 85025 COMPLETE CBC W/AUTO DIFF WBC: CPT

## 2023-07-14 PROCEDURE — 36415 COLL VENOUS BLD VENIPUNCTURE: CPT

## 2023-07-14 PROCEDURE — 80143 DRUG ASSAY ACETAMINOPHEN: CPT

## 2023-07-14 PROCEDURE — 93005 ELECTROCARDIOGRAM TRACING: CPT | Performed by: EMERGENCY MEDICINE

## 2023-07-14 PROCEDURE — 81001 URINALYSIS AUTO W/SCOPE: CPT

## 2023-07-14 PROCEDURE — 1240000000 HC EMOTIONAL WELLNESS R&B

## 2023-07-14 PROCEDURE — 80307 DRUG TEST PRSMV CHEM ANLYZR: CPT

## 2023-07-14 PROCEDURE — 99285 EMERGENCY DEPT VISIT HI MDM: CPT

## 2023-07-14 PROCEDURE — 80053 COMPREHEN METABOLIC PANEL: CPT

## 2023-07-14 ASSESSMENT — LIFESTYLE VARIABLES: HOW OFTEN DO YOU HAVE A DRINK CONTAINING ALCOHOL: NEVER

## 2023-07-15 PROBLEM — F33.1 MDD (MAJOR DEPRESSIVE DISORDER), RECURRENT EPISODE, MODERATE (HCC): Status: RESOLVED | Noted: 2023-07-14 | Resolved: 2023-07-15

## 2023-07-15 PROCEDURE — 1240000000 HC EMOTIONAL WELLNESS R&B

## 2023-07-15 PROCEDURE — 6370000000 HC RX 637 (ALT 250 FOR IP): Performed by: PSYCHIATRY & NEUROLOGY

## 2023-07-15 PROCEDURE — 6370000000 HC RX 637 (ALT 250 FOR IP): Performed by: NURSE PRACTITIONER

## 2023-07-15 PROCEDURE — 90792 PSYCH DIAG EVAL W/MED SRVCS: CPT | Performed by: NURSE PRACTITIONER

## 2023-07-15 RX ORDER — HYDROXYZINE PAMOATE 50 MG/1
50 CAPSULE ORAL 3 TIMES DAILY PRN
Status: DISCONTINUED | OUTPATIENT
Start: 2023-07-15 | End: 2023-07-19 | Stop reason: HOSPADM

## 2023-07-15 RX ORDER — HALOPERIDOL 5 MG/1
5 TABLET ORAL EVERY 6 HOURS PRN
Status: DISCONTINUED | OUTPATIENT
Start: 2023-07-15 | End: 2023-07-19 | Stop reason: HOSPADM

## 2023-07-15 RX ORDER — LANOLIN ALCOHOL/MO/W.PET/CERES
3 CREAM (GRAM) TOPICAL NIGHTLY PRN
Status: DISCONTINUED | OUTPATIENT
Start: 2023-07-15 | End: 2023-07-19 | Stop reason: HOSPADM

## 2023-07-15 RX ORDER — CITALOPRAM 20 MG/1
10 TABLET ORAL DAILY
Status: DISCONTINUED | OUTPATIENT
Start: 2023-07-15 | End: 2023-07-19 | Stop reason: HOSPADM

## 2023-07-15 RX ORDER — MAGNESIUM HYDROXIDE/ALUMINUM HYDROXICE/SIMETHICONE 120; 1200; 1200 MG/30ML; MG/30ML; MG/30ML
30 SUSPENSION ORAL PRN
Status: DISCONTINUED | OUTPATIENT
Start: 2023-07-15 | End: 2023-07-19 | Stop reason: HOSPADM

## 2023-07-15 RX ORDER — ACETAMINOPHEN 325 MG/1
650 TABLET ORAL EVERY 6 HOURS PRN
Status: DISCONTINUED | OUTPATIENT
Start: 2023-07-15 | End: 2023-07-19 | Stop reason: HOSPADM

## 2023-07-15 RX ORDER — MIRTAZAPINE 15 MG/1
7.5 TABLET, FILM COATED ORAL NIGHTLY
Status: DISCONTINUED | OUTPATIENT
Start: 2023-07-15 | End: 2023-07-19 | Stop reason: HOSPADM

## 2023-07-15 RX ORDER — NICOTINE 21 MG/24HR
1 PATCH, TRANSDERMAL 24 HOURS TRANSDERMAL DAILY
Status: DISCONTINUED | OUTPATIENT
Start: 2023-07-15 | End: 2023-07-19 | Stop reason: HOSPADM

## 2023-07-15 RX ORDER — HALOPERIDOL 5 MG/ML
5 INJECTION INTRAMUSCULAR EVERY 6 HOURS PRN
Status: DISCONTINUED | OUTPATIENT
Start: 2023-07-15 | End: 2023-07-19 | Stop reason: HOSPADM

## 2023-07-15 RX ADMIN — HYDROXYZINE PAMOATE 50 MG: 50 CAPSULE ORAL at 22:14

## 2023-07-15 RX ADMIN — CITALOPRAM HYDROBROMIDE 10 MG: 20 TABLET ORAL at 12:34

## 2023-07-15 RX ADMIN — MIRTAZAPINE 7.5 MG: 15 TABLET, FILM COATED ORAL at 22:14

## 2023-07-15 RX ADMIN — MELATONIN 3 MG ORAL TABLET 3 MG: 3 TABLET ORAL at 22:14

## 2023-07-15 ASSESSMENT — LIFESTYLE VARIABLES
HOW MANY STANDARD DRINKS CONTAINING ALCOHOL DO YOU HAVE ON A TYPICAL DAY: PATIENT DOES NOT DRINK
HOW MANY STANDARD DRINKS CONTAINING ALCOHOL DO YOU HAVE ON A TYPICAL DAY: PATIENT DOES NOT DRINK
HOW OFTEN DO YOU HAVE A DRINK CONTAINING ALCOHOL: NEVER
HOW OFTEN DO YOU HAVE A DRINK CONTAINING ALCOHOL: NEVER

## 2023-07-15 ASSESSMENT — SLEEP AND FATIGUE QUESTIONNAIRES
SLEEP PATTERN: NORMAL
DO YOU HAVE DIFFICULTY SLEEPING: YES
DO YOU HAVE DIFFICULTY SLEEPING: NO
DO YOU USE A SLEEP AID: NO
AVERAGE NUMBER OF SLEEP HOURS: 7
SLEEP PATTERN: DIFFICULTY FALLING ASLEEP
AVERAGE NUMBER OF SLEEP HOURS: 8
DO YOU USE A SLEEP AID: NO

## 2023-07-15 ASSESSMENT — PAIN SCALES - GENERAL
PAINLEVEL_OUTOF10: 0
PAINLEVEL_OUTOF10: 0

## 2023-07-15 ASSESSMENT — PATIENT HEALTH QUESTIONNAIRE - PHQ9
SUM OF ALL RESPONSES TO PHQ QUESTIONS 1-9: 21
SUM OF ALL RESPONSES TO PHQ QUESTIONS 1-9: 21

## 2023-07-16 PROCEDURE — 6370000000 HC RX 637 (ALT 250 FOR IP): Performed by: PSYCHIATRY & NEUROLOGY

## 2023-07-16 PROCEDURE — 6370000000 HC RX 637 (ALT 250 FOR IP): Performed by: NURSE PRACTITIONER

## 2023-07-16 PROCEDURE — 1240000000 HC EMOTIONAL WELLNESS R&B

## 2023-07-16 PROCEDURE — 99232 SBSQ HOSP IP/OBS MODERATE 35: CPT | Performed by: NURSE PRACTITIONER

## 2023-07-16 RX ADMIN — MIRTAZAPINE 7.5 MG: 15 TABLET, FILM COATED ORAL at 21:19

## 2023-07-16 RX ADMIN — CITALOPRAM HYDROBROMIDE 10 MG: 20 TABLET ORAL at 08:58

## 2023-07-16 RX ADMIN — HYDROXYZINE PAMOATE 50 MG: 50 CAPSULE ORAL at 21:19

## 2023-07-16 RX ADMIN — MELATONIN 3 MG ORAL TABLET 3 MG: 3 TABLET ORAL at 21:19

## 2023-07-17 LAB
EKG ATRIAL RATE: 78 BPM
EKG P AXIS: 72 DEGREES
EKG P-R INTERVAL: 146 MS
EKG Q-T INTERVAL: 400 MS
EKG QRS DURATION: 70 MS
EKG QTC CALCULATION (BAZETT): 456 MS
EKG R AXIS: 58 DEGREES
EKG T AXIS: 50 DEGREES
EKG VENTRICULAR RATE: 78 BPM

## 2023-07-17 PROCEDURE — 93010 ELECTROCARDIOGRAM REPORT: CPT | Performed by: INTERNAL MEDICINE

## 2023-07-17 PROCEDURE — 1240000000 HC EMOTIONAL WELLNESS R&B

## 2023-07-17 PROCEDURE — 99232 SBSQ HOSP IP/OBS MODERATE 35: CPT | Performed by: NURSE PRACTITIONER

## 2023-07-17 PROCEDURE — 6370000000 HC RX 637 (ALT 250 FOR IP): Performed by: NURSE PRACTITIONER

## 2023-07-17 RX ADMIN — MIRTAZAPINE 7.5 MG: 15 TABLET, FILM COATED ORAL at 20:24

## 2023-07-17 RX ADMIN — CITALOPRAM HYDROBROMIDE 10 MG: 20 TABLET ORAL at 09:39

## 2023-07-17 ASSESSMENT — PAIN SCALES - GENERAL
PAINLEVEL_OUTOF10: 0

## 2023-07-17 NOTE — BH NOTE
Pt denies suicidal / homicidal ideations. Pt denies hallucinations. Pt is cooperative. Pt is mostly isolative, but does come out for meals. Keeps to self if in the day room. No other behavioral abnormalities assessed at this time.

## 2023-07-17 NOTE — PROGRESS NOTES
BEHAVIORAL HEALTH FOLLOW-UP NOTE     7/17/2023     Patient was seen and examined in person, Chart reviewed   Patient's case discussed with staff/team    Chief Complaint: \"I am doing okay. \"    Interim History: Patient in his room this morning, denies suicidal ideations, however states that depression is no better. He states that he does not know if the medications are working or not. He is blunted, evasive and vague. States more issues with the wife. No overt or covert psychosis, no paranoia.        Appetite: [x] Normal/Unchanged  [] Increased  [] Decreased      Sleep:       [x] Normal/Unchanged  [] Fair       [] Poor              Energy:    [x] Normal/Unchanged  [] Increased  [] Decreased        SI [] Present  [x] Absent    HI  []Present  [x] Absent     Aggression:  [] yes  [x] no    Patient is [x] able  [] unable to CONTRACT FOR SAFETY     PAST MEDICAL/PSYCHIATRIC HISTORY:   Past Medical History:   Diagnosis Date    Cancer ([facility])     Depression     Major depressive disorder, recurrent episode, severe ([facility]) 9/14/2011    MDD (major depressive disorder), recurrent severe, without psychosis ([facility])     Overactive bladder 9/14/2011    Prostate cancer ([facility])     Suicidal ideations     Urinary incontinence        FAMILY/SOCIAL HISTORY:  Family History   Problem Relation Age of Onset    Cancer Sister     Depression Sister     Cancer Brother     Depression Brother      Social History     Socioeconomic History    Marital status:      Spouse name: Not on file    Number of children: 0    Years of education: Not on file    Highest education level: Not on file   Occupational History    Not on file   Tobacco Use    Smoking status: Never    Smokeless tobacco: Never   Vaping Use    Vaping Use: Never used   Substance and Sexual Activity    Alcohol use: No     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

## 2023-07-17 NOTE — PROGRESS NOTES
Attended evening recreation activity. Patients participated in [address] [facility]. Patient pleasant and willing to answer questions. Patient was 1 of 7 in attendance.

## 2023-07-17 NOTE — GROUP NOTE
Group Therapy Note    Date: 7/17/2023    Group Start Time: 1410  Group End Time: 4054  Group Topic: Cognitive Skills    [facility] AC[facility] 1    Cary Woods LPC        Group Therapy Note    Attendees: 8     Patient's Goal:  To attend cognitive distortions cognitive skills group     Notes:  Patient was an active participant in group       Status After Intervention:  Unchanged    Participation Level:  Active Listener    Participation Quality: Appropriate      Speech:  normal      Thought Process/Content: Logical      Affective Functioning: Congruent      Mood: euthymic      Level of consciousness:  Alert      Response to Learning: Able to verbalize current knowledge/experience      Endings: None Reported    Modes of Intervention: Education, Support, Socialization, Exploration, Clarifying, and Problem-solving      Discipline Responsible: /Counselor      Signature:  Talon Seo LPC

## 2023-07-17 NOTE — PROGRESS NOTES
Pt resting in bed with eyes closed. Respirations even and unlabored. No signs of distress noted. Q15 mins safety rounds maintained.

## 2023-07-17 NOTE — PROGRESS NOTES
Assumed care of pt at 1900. Pt observed sitting quietly in day area, watching TV with peers. Pleasant upon approach. Presents flat but brightens with conversation. Denies suicidal and homicidal ideation, as well as hallucinations. Endorses mild anxiety. Denies pain. Pt states it was a \"strange day\" and feels like his medication has been making him sleep too much. Compliant with scheduled night time medication. No complaints voiced. No behaviors noted. Q15 min safety rounds maintained.

## 2023-07-18 PROCEDURE — 6370000000 HC RX 637 (ALT 250 FOR IP): Performed by: NURSE PRACTITIONER

## 2023-07-18 PROCEDURE — 99232 SBSQ HOSP IP/OBS MODERATE 35: CPT | Performed by: NURSE PRACTITIONER

## 2023-07-18 PROCEDURE — 1240000000 HC EMOTIONAL WELLNESS R&B

## 2023-07-18 RX ORDER — DOCUSATE SODIUM 100 MG/1
100 CAPSULE, LIQUID FILLED ORAL 2 TIMES DAILY
Status: DISCONTINUED | OUTPATIENT
Start: 2023-07-18 | End: 2023-07-19 | Stop reason: HOSPADM

## 2023-07-18 RX ORDER — BISACODYL 5 MG/1
5 TABLET, DELAYED RELEASE ORAL ONCE
Status: COMPLETED | OUTPATIENT
Start: 2023-07-18 | End: 2023-07-18

## 2023-07-18 RX ADMIN — MIRTAZAPINE 7.5 MG: 15 TABLET, FILM COATED ORAL at 20:33

## 2023-07-18 RX ADMIN — DOCUSATE SODIUM 100 MG: 100 CAPSULE, LIQUID FILLED ORAL at 20:34

## 2023-07-18 RX ADMIN — CITALOPRAM HYDROBROMIDE 10 MG: 20 TABLET ORAL at 08:22

## 2023-07-18 RX ADMIN — DOCUSATE SODIUM 100 MG: 100 CAPSULE, LIQUID FILLED ORAL at 15:03

## 2023-07-18 RX ADMIN — BISACODYL 5 MG: 5 TABLET, COATED ORAL at 15:03

## 2023-07-18 ASSESSMENT — PAIN SCALES - GENERAL: PAINLEVEL_OUTOF10: 0

## 2023-07-18 NOTE — CARE COORDINATION
LPC called pt's wife Will Alvarado [phone], ALEXIS signed. [facility]3].      Electronically signed by Jayden Cerda LPC on 7/18/2023 at 1:08 PM

## 2023-07-18 NOTE — PROGRESS NOTES
Patient denies SI/HI/AVH. Denies  anxiety and depression. A&O x 4, denies pain. Observed in day area watching tv, and keeps to himself. Patient is calm pleasant and cooperative. Medication compliant. No s/s of distress noted. Purposeful rounds continued Q 15 minutes.

## 2023-07-18 NOTE — CARE COORDINATION
Pt declined invitation to cognitive skills group    Electronically signed by Christiano Jackson LPC on 7/18/2023 at 3:11 PM

## 2023-07-18 NOTE — PROGRESS NOTES
Patient attended morning community meeting. Updated on staffing assignments and daily expectations. Shared goal for the day as to hope to go home today, be hopeful.

## 2023-07-18 NOTE — PROGRESS NOTES
[facility]  Day 3 Interdisciplinary Treatment Plan NOTE    Review Date & Time: 7/18/2023 0900    Patient was in treatment team    Estimated Length of Stay Update:  3-5 days  Estimated Discharge Date Update: 7/21/2023    EDUCATION:   Learner Progress Toward Treatment Goals: Reviewed results and recommendations of this team, Reviewed group plan and strategies, and Reviewed signs, symptoms and risk of self harm and violent behavior    Method: Small group    Outcome: Verbalized understanding    PATIENT GOALS:  none at this time     PLAN/TREATMENT RECOMMENDATIONS UPDATE: encourage patient to attend and participate in groups.  Take medications as prescribed     GOALS UPDATE:   Time frame for Short-Term Goals: prior to discharge      Tanya Howe RN

## 2023-07-18 NOTE — CARE COORDINATION
[address] contacted pt's wife Suraj Alcaraz [phone], ALEXIS signed. Wife reports that the morning he was admitted, he had to go to the [place] to  medications, and a few hours later wife got a phone call and had seen a doctor there who felt he needed to be in the hospital so he was transported there. Wife also reports that he has early dementia from a diagnosis from the [place]. She reports that he forgets things a lot more recently as well. Suraj Alcaraz reported that pt will be able to return home at time of discharge and her sister may be able to pick him up when he is ready to leave.      Electronically signed by Musa Heaton LPC on 7/18/2023 at 3:26 PM

## 2023-07-18 NOTE — PROGRESS NOTES
Patient resting in bed with eyes closed. Respirations are even and unlabored. No s/s of distress noted. Purposeful rounds continued Q 15 minutes.

## 2023-07-18 NOTE — PROGRESS NOTES
BEHAVIORAL HEALTH FOLLOW-UP NOTE     7/18/2023     Patient was seen and examined in person, Chart reviewed   Patient's case discussed with staff/team    Chief Complaint: \"I am doing okay, I was hoping to go home today\"    Interim History: Patient in his room this morning, denies suicidal ideations, however states that depression is no better. Laughs and states that he is asked this multiple times a day. He makes good eye contact. He states that he is not getting all of his medications, however he is receiving his medications. He is brighter today, makes better eye contact. States that he has been speaking with his wife, and that she feels he is doing better. No overt or covert psychosis, no paranoia.      Appetite: [x] Normal/Unchanged  [] Increased  [] Decreased      Sleep:       [x] Normal/Unchanged  [] Fair       [] Poor              Energy:    [x] Normal/Unchanged  [] Increased  [] Decreased        SI [] Present  [x] Absent    HI  []Present  [x] Absent     Aggression:  [] yes  [x] no    Patient is [x] able  [] unable to CONTRACT FOR SAFETY     PAST MEDICAL/PSYCHIATRIC HISTORY:   Past Medical History:   Diagnosis Date    Cancer ([facility])     Depression     Major depressive disorder, recurrent episode, severe ([facility]) 9/14/2011    MDD (major depressive disorder), recurrent severe, without psychosis ([facility])     Overactive bladder 9/14/2011    Prostate cancer ([facility])     Suicidal ideations     Urinary incontinence        FAMILY/SOCIAL HISTORY:  Family History   Problem Relation Age of Onset    Cancer Sister     Depression Sister     Cancer Brother     Depression Brother      Social History     Socioeconomic History    Marital status:      Spouse name: Not on file    Number of children: 0    Years of education: Not on file    Highest education level: Not on file   Occupational History    Not on file   Tobacco Use    Smoking status: Never    Smokeless tobacco: Never   Vaping Use    Vaping Use: Never used   Substance

## 2023-07-18 NOTE — GROUP NOTE
Group Therapy Note    Date: 7/18/2023    Group Start Time: 8888  Group End Time: 8026  Group Topic: Psychoeducation    [facility] [address], [place]                                                                        Group Therapy Note    Date: 7/18/2023  Type of Group: Psychoeducation    Wellness Binder Information  Module Name:  taking time for yourself   Patient's Goal:  Patient will be able to identify key steps to taking time for ones self. Will be able to identify keys to making positive change for self care. Notes:  Pleasant and sharing in group, willing to identify 1-2 steps to make self care a priority. Status After Intervention:  Improved    Participation Level:  Active Listener and Interactive    Participation Quality: Appropriate, Attentive, Sharing, and Supportive      Speech:  normal      Thought Process/Content: Logical      Affective Functioning: Congruent      Mood: euthymic      Level of consciousness:  Alert, Oriented x4, and Attentive      Response to Learning: Able to verbalize/acknowledge new learning, Able to retain information, and Progressing to goal      Endings: None Reported    Modes of Intervention: Education, Support, Socialization, Exploration, and Problem-solving      Discipline Responsible: Psychoeducational Specialist      Signature:  Reji Warren

## 2023-07-19 VITALS
SYSTOLIC BLOOD PRESSURE: 132 MMHG | OXYGEN SATURATION: 97 % | HEIGHT: 74 IN | DIASTOLIC BLOOD PRESSURE: 73 MMHG | TEMPERATURE: 98.1 F | HEART RATE: 80 BPM | WEIGHT: 164.8 LBS | BODY MASS INDEX: 21.15 KG/M2 | RESPIRATION RATE: 16 BRPM

## 2023-07-19 PROCEDURE — 6370000000 HC RX 637 (ALT 250 FOR IP): Performed by: NURSE PRACTITIONER

## 2023-07-19 PROCEDURE — 99239 HOSP IP/OBS DSCHRG MGMT >30: CPT | Performed by: NURSE PRACTITIONER

## 2023-07-19 RX ORDER — CITALOPRAM 10 MG/1
10 TABLET ORAL DAILY
Qty: 30 TABLET | Refills: 0 | Status: SHIPPED | OUTPATIENT
Start: 2023-07-19 | End: 2023-08-18

## 2023-07-19 RX ORDER — PSEUDOEPHEDRINE HCL 30 MG
100 TABLET ORAL 2 TIMES DAILY
COMMUNITY
Start: 2023-07-19

## 2023-07-19 RX ORDER — MIRTAZAPINE 7.5 MG/1
7.5 TABLET, FILM COATED ORAL NIGHTLY
Qty: 30 TABLET | Refills: 0 | Status: SHIPPED | OUTPATIENT
Start: 2023-07-19 | End: 2023-08-18

## 2023-07-19 RX ADMIN — CITALOPRAM HYDROBROMIDE 10 MG: 20 TABLET ORAL at 08:34

## 2023-07-19 RX ADMIN — DOCUSATE SODIUM 100 MG: 100 CAPSULE, LIQUID FILLED ORAL at 08:34

## 2023-07-19 ASSESSMENT — PAIN SCALES - GENERAL: PAINLEVEL_OUTOF10: 0

## 2023-07-19 NOTE — PROGRESS NOTES
[facility]  Discharge Note    Pt discharged with followings belongings:   Dental Appliances: Uppers, Lowers  Vision - Corrective Lenses: Other (Comment) (readers)  Hearing Aid: Bilateral hearing aids  Jewelry: None  Body Piercings Removed: N/A  Clothing: Footwear, Socks, Shirt  Other Valuables: Phil Gage returned to patient. Patient educated on aftercare instructions: yes  Information faxed to n/a by n/a  at 10:55 AM .Patient verbalize understanding of AVS:  yes. Status EXAM upon discharge:  Mental Status and Behavioral Exam  Normal: No  Level of Assistance: Independent/Self  Facial Expression: Flat  Affect: Congruent  Level of Consciousness: Alert  Frequency of Checks: 4 times per hour, close  Mood:Normal: No  Mood: Anxious  Motor Activity:Normal: No  Motor Activity: Decreased  Eye Contact: Good  Observed Behavior: Cooperative  Sexual Misconduct History: Current - no  Preception: Turner to person, Turner to time, Turner to place, Turner to situation  Attention:Normal: Yes  Attention: Others (comment)  Thought Processes: Circumstantial  Thought Content:Normal: Yes  Thought Content: Other (comment)  Depression Symptoms: No problems reported or observed. Anxiety Symptoms: Generalized  Maribeth Symptoms: No problems reported or observed.   Hallucinations: None  Delusions: No  Memory:Normal: Yes  Memory: Other (comment)  Insight and Judgment: No  Insight and Judgment: Poor judgment, Poor insight    Tobacco Screening:  Practical Counseling, on admission, sarah X, if applicable and completed (first 3 are required if patient doesn't refuse):            ( ) Recognizing danger situations (included triggers and roadblocks)                    ( ) Coping skills (new ways to manage stress,relaxation techniques, changing routine, distraction)                                                           ( ) Basic information about quitting (benefits of quitting, techniques in how to quit, available

## 2023-07-19 NOTE — CARE COORDINATION
MAVIS met with pt to discuss discharge planning. He denies SI/HI/AVH and reports he is ready to go whenever we can send him. Pt is cooperative, good eye contact, normal speech and improved insight/judgement. He reports that he is returning back home to live with his wife and that his sister-in-law can pick him up. [address] contacted pt's wife Brinda Maya signed. She reported that she does not have any concerns about the pt returning home and denies him having any access to guns/weapons. She reports that her sister can pick him up to get his car at the [place] so he can then drive home.      In order to ensure appropriate transition and discharge planning is in place, the following documents have been transmitted to [facility], as the new outpatient provider:    The d/c diagnosis was transmitted to the next care provider  The reason for hospitalization was transmitted to the next care provider  The d/c medications (dosage and indication) were transmitted to the next care provider   The continuing care plan was transmitted to the next care provider     Electronically signed by Mervat Pedersen LPC on 7/19/2023 at 10:49 AM

## 2023-07-19 NOTE — PROGRESS NOTES
Patient denies SI/HI/AVH. Denies anxiety and depression. A&O x 3, denies pain. Observed in day area watching tv, stays to himself. Patient is pleasant friendly cooperative. Medication compliant. No s/s of distress noted. No behaviors noted. Purposeful rounds continued Q 15 minutes.

## 2023-07-19 NOTE — PLAN OF CARE
Problem: Depression  Goal: Will be euthymic at discharge  Description: INTERVENTIONS:  1. Administer medication as ordered  2. Provide emotional support via 1:1 interaction with staff  3. Encourage involvement in milieu/groups/activities  4. Monitor for social isolation  Outcome: Progressing     Problem: Psychosis  Goal: Will report no hallucinations or delusions  Description: INTERVENTIONS:  1. Administer medication as  ordered  2. Assist with reality testing to support increasing orientation  3. Assess if patient's hallucinations or delusions are encouraging self harm or harm to others and intervene as appropriate  Outcome: Progressing     Problem: Anxiety  Goal: Will report anxiety at manageable levels  Description: INTERVENTIONS:  1. Administer medication as ordered  2. Teach and rehearse alternative coping skills  3.  Provide emotional support with 1:1 interaction with staff  Outcome: Progressing
Problem: Self Harm/Suicidality  Goal: Will have no self-injury during hospital stay  Description: INTERVENTIONS:  1. Ensure constant observer at bedside with Q15M safety checks  2. Maintain a safe environment  3. Secure patient belongings  4. Ensure family/visitors adhere to safety recommendations  5. Ensure safety tray has been added to patient's diet order  6. Every shift and PRN: Re-assess suicidal risk via Frequent Screener    7/19/2023 0849 by Sol Kumar RN  Outcome: Progressing     Problem: Depression  Goal: Will be euthymic at discharge  Description: INTERVENTIONS:  1. Administer medication as ordered  2. Provide emotional support via 1:1 interaction with staff  3. Encourage involvement in milieu/groups/activities  4. Monitor for social isolation  7/19/2023 0849 by Sol Kumar RN  Outcome: Progressing     Problem: Psychosis  Goal: Will report no hallucinations or delusions  Description: INTERVENTIONS:  1. Administer medication as  ordered  2. Assist with reality testing to support increasing orientation  3. Assess if patient's hallucinations or delusions are encouraging self harm or harm to others and intervene as appropriate  7/19/2023 0849 by Sol Kumar RN  Outcome: Progressing     Problem: Behavior  Goal: Pt/Family maintain appropriate behavior and adhere to behavioral management agreement, if implemented  Description: INTERVENTIONS:  1. Assess patient/family's coping skills and  non-compliant behavior (including use of illegal substances)  2. Notify security of behavior or suspected illegal substances which indicate the need for search of the family and/or belongings  3. Encourage verbalization of thoughts and concerns in a socially appropriate manner  4. Utilize positive, consistent limit setting strategies supporting safety of patient, staff and others  5. Encourage participation in the decision making process about the behavioral management agreement  6.  If a visitor's
Problem: Self Harm/Suicidality  Goal: Will have no self-injury during hospital stay  Description: INTERVENTIONS:  1. Ensure constant observer at bedside with Q15M safety checks  2. Maintain a safe environment  3. Secure patient belongings  4. Ensure family/visitors adhere to safety recommendations  5. Ensure safety tray has been added to patient's diet order  6. Every shift and PRN: Re-assess suicidal risk via Frequent Screener    Outcome: Progressing     Problem: Depression  Goal: Will be euthymic at discharge  Description: INTERVENTIONS:  1. Administer medication as ordered  2. Provide emotional support via 1:1 interaction with staff  3. Encourage involvement in milieu/groups/activities  4. Monitor for social isolation  Outcome: Progressing     Problem: Psychosis  Goal: Will report no hallucinations or delusions  Description: INTERVENTIONS:  1. Administer medication as  ordered  2. Assist with reality testing to support increasing orientation  3. Assess if patient's hallucinations or delusions are encouraging self harm or harm to others and intervene as appropriate  Outcome: Progressing     Problem: Behavior  Goal: Pt/Family maintain appropriate behavior and adhere to behavioral management agreement, if implemented  Description: INTERVENTIONS:  1. Assess patient/family's coping skills and  non-compliant behavior (including use of illegal substances)  2. Notify security of behavior or suspected illegal substances which indicate the need for search of the family and/or belongings  3. Encourage verbalization of thoughts and concerns in a socially appropriate manner  4. Utilize positive, consistent limit setting strategies supporting safety of patient, staff and others  5. Encourage participation in the decision making process about the behavioral management agreement  6. If a visitor's behavior poses a threat to safety call refer to organization policy.   7. Initiate consult with , Psychosocial
Pt is stable and alert. Pt has a slight flat affect, however is communicative. Pt denies suicidal / homicidal ideations. Pt denies hallucination. Pt is without delusional thought processes at this time.
Help patient/famiy explore and identify spiritual beliefs, cultural understandings or values that may help or hinder letting go of issue. 6. Help patient/family explore feelings of anger, bitterness, resentment. 7. Help patient/family identify and examine the situation in which these feelings are experienced. 8. Help patient/family identify destructive displacement of feelings onto other individuals. 9. Invite use of sacraments/rituals/ceremonies as appropriate (e.g. - confession, anointing, smudging). 10. Refer patient/family to formal counseling and/or to [facility] for further support work.   Outcome: Progressing     Problem: Nutrition Deficit:  Goal: Optimize nutritional status  Outcome: Progressing

## 2023-07-19 NOTE — DISCHARGE SUMMARY
DISCHARGE SUMMARY      Patient ID:  Nancy Sahu  [id number]  61 y.o.  1950    Admit date: 7/14/2023    Discharge date and time: 7/19/2023    Admitting Physician: Tawanda Bentley MD     Discharge Physician: Dr Zeenat Horton MD    Discharge Diagnoses:   Patient Active Problem List   Diagnosis    Hypertriglyceridemia    Recurrent major depressive disorder (HCC)    MDD (major depressive disorder), recurrent severe, without psychosis ([address])    Suicidal ideation    Mixed hyperlipidemia    Suicide attempt by substance overdose ([address])    Metabolic acidosis due to ethylene glycol    Ethylene glycol poisoning    Drug overdoses, intentional self-harm, initial encounter ([address])    DESIREE (acute kidney injury) ([address])    Chronic fatigue    Vitamin D deficiency    Hypothyroidism    Cluster B personality disorder in adult [facility])    Depression with suicidal ideation       Admission Condition: poor    Discharged Condition: stable    Admission Circumstance:   presented to the ED sent in by the [place] reporting suicidal ideation with a plan to hang himself reported triggered by multiple things and reported feeling overwhelmed and depressed         PAST MEDICAL/PSYCHIATRIC HISTORY:   Past Medical History:   Diagnosis Date    Cancer ([address])     Depression     Major depressive disorder, recurrent episode, severe ([address]) 9/14/2011    MDD (major depressive disorder), recurrent severe, without psychosis ([address])     Overactive bladder 9/14/2011    Prostate cancer ([address])     Suicidal ideations     Urinary incontinence        FAMILY/SOCIAL HISTORY:  Family History   Problem Relation Age of Onset    Cancer Sister     Depression Sister     Cancer Brother     Depression Brother      Social History     Socioeconomic History    Marital status:      Spouse name: Not on file    Number of children: 0    Years of education: Not on file    Highest education level: Not on file   Occupational History    Not on file   Tobacco Use    Smoking status: Never    Smokeless

## 2023-07-19 NOTE — PROGRESS NOTES
Patient attended community meeting   Was updated on expectations of the unit, staffing, and programming  Patient shared goal for today as \"talk with doctor\"

## 2023-08-15 ENCOUNTER — APPOINTMENT (OUTPATIENT)
Dept: GENERAL RADIOLOGY | Age: 73
End: 2023-08-15
Payer: OTHER GOVERNMENT

## 2023-08-15 ENCOUNTER — APPOINTMENT (OUTPATIENT)
Dept: CT IMAGING | Age: 73
End: 2023-08-15
Payer: OTHER GOVERNMENT

## 2023-08-15 ENCOUNTER — HOSPITAL ENCOUNTER (EMERGENCY)
Age: 73
Discharge: ANOTHER HOSPITAL | End: 2023-08-16
Attending: EMERGENCY MEDICINE
Payer: OTHER GOVERNMENT

## 2023-08-15 DIAGNOSIS — F32.A DEPRESSION WITH SUICIDAL IDEATION: Primary | ICD-10-CM

## 2023-08-15 DIAGNOSIS — R45.851 DEPRESSION WITH SUICIDAL IDEATION: Primary | ICD-10-CM

## 2023-08-15 DIAGNOSIS — R42 DIZZINESS: ICD-10-CM

## 2023-08-15 LAB
ALBUMIN SERPL-MCNC: 4.5 G/DL (ref 3.5–5.2)
ALP SERPL-CCNC: 91 U/L (ref 40–129)
ALT SERPL-CCNC: 8 U/L (ref 0–40)
AMPHET UR QL SCN: NEGATIVE
ANION GAP SERPL CALCULATED.3IONS-SCNC: 15 MMOL/L (ref 7–16)
APAP SERPL-MCNC: <5 UG/ML (ref 10–30)
AST SERPL-CCNC: 19 U/L (ref 0–39)
BARBITURATES UR QL SCN: NEGATIVE
BASOPHILS # BLD: 0.02 K/UL (ref 0–0.2)
BASOPHILS NFR BLD: 0 % (ref 0–2)
BENZODIAZ UR QL: NEGATIVE
BILIRUB SERPL-MCNC: 0.8 MG/DL (ref 0–1.2)
BUN SERPL-MCNC: 13 MG/DL (ref 6–23)
BUPRENORPHINE UR QL: NEGATIVE
CALCIUM SERPL-MCNC: 9.6 MG/DL (ref 8.6–10.2)
CANNABINOIDS UR QL SCN: NEGATIVE
CHLORIDE SERPL-SCNC: 101 MMOL/L (ref 98–107)
CO2 SERPL-SCNC: 25 MMOL/L (ref 22–29)
COCAINE UR QL SCN: NEGATIVE
CREAT SERPL-MCNC: 1.4 MG/DL (ref 0.7–1.2)
EKG ATRIAL RATE: 68 BPM
EKG P AXIS: 63 DEGREES
EKG P-R INTERVAL: 156 MS
EKG Q-T INTERVAL: 400 MS
EKG QRS DURATION: 78 MS
EKG QTC CALCULATION (BAZETT): 425 MS
EKG R AXIS: 56 DEGREES
EKG T AXIS: 62 DEGREES
EKG VENTRICULAR RATE: 68 BPM
EOSINOPHIL # BLD: 0.1 K/UL (ref 0.05–0.5)
EOSINOPHILS RELATIVE PERCENT: 2 % (ref 0–6)
ERYTHROCYTE [DISTWIDTH] IN BLOOD BY AUTOMATED COUNT: 13.5 % (ref 11.5–15)
ETHANOLAMINE SERPL-MCNC: <10 MG/DL
FENTANYL UR QL: NEGATIVE
FLUAV RNA RESP QL NAA+PROBE: NOT DETECTED
FLUBV RNA RESP QL NAA+PROBE: NOT DETECTED
GFR SERPL CREATININE-BSD FRML MDRD: 55 ML/MIN/1.73M2
GLUCOSE SERPL-MCNC: 116 MG/DL (ref 74–99)
HCT VFR BLD AUTO: 45.9 % (ref 37–54)
HGB BLD-MCNC: 14.9 G/DL (ref 12.5–16.5)
IMM GRANULOCYTES # BLD AUTO: <0.03 K/UL (ref 0–0.58)
IMM GRANULOCYTES NFR BLD: 0 % (ref 0–5)
LYMPHOCYTES NFR BLD: 0.97 K/UL (ref 1.5–4)
LYMPHOCYTES RELATIVE PERCENT: 18 % (ref 20–42)
MCH RBC QN AUTO: 31.2 PG (ref 26–35)
MCHC RBC AUTO-ENTMCNC: 32.5 G/DL (ref 32–34.5)
MCV RBC AUTO: 96 FL (ref 80–99.9)
METHADONE UR QL: NEGATIVE
MONOCYTES NFR BLD: 0.52 K/UL (ref 0.1–0.95)
MONOCYTES NFR BLD: 10 % (ref 2–12)
NEUTROPHILS NFR BLD: 69 % (ref 43–80)
NEUTS SEG NFR BLD: 3.67 K/UL (ref 1.8–7.3)
OPIATES UR QL SCN: NEGATIVE
OXYCODONE UR QL SCN: NEGATIVE
PCP UR QL SCN: NEGATIVE
PLATELET # BLD AUTO: 203 K/UL (ref 130–450)
PMV BLD AUTO: 9.6 FL (ref 7–12)
POTASSIUM SERPL-SCNC: 4.4 MMOL/L (ref 3.5–5)
PROT SERPL-MCNC: 6.9 G/DL (ref 6.4–8.3)
RBC # BLD AUTO: 4.78 M/UL (ref 3.8–5.8)
SALICYLATES SERPL-MCNC: <0.3 MG/DL (ref 0–30)
SARS-COV-2 RNA RESP QL NAA+PROBE: NOT DETECTED
SODIUM SERPL-SCNC: 141 MMOL/L (ref 132–146)
SOURCE: NORMAL
SPECIMEN DESCRIPTION: NORMAL
TEST INFORMATION: NORMAL
TOXIC TRICYCLIC SC,BLOOD: NEGATIVE
TROPONIN I SERPL HS-MCNC: 16 NG/L (ref 0–11)
TROPONIN I SERPL HS-MCNC: 17 NG/L (ref 0–11)
WBC OTHER # BLD: 5.3 K/UL (ref 4.5–11.5)

## 2023-08-15 PROCEDURE — 99285 EMERGENCY DEPT VISIT HI MDM: CPT

## 2023-08-15 PROCEDURE — 80053 COMPREHEN METABOLIC PANEL: CPT

## 2023-08-15 PROCEDURE — 93005 ELECTROCARDIOGRAM TRACING: CPT | Performed by: STUDENT IN AN ORGANIZED HEALTH CARE EDUCATION/TRAINING PROGRAM

## 2023-08-15 PROCEDURE — G0480 DRUG TEST DEF 1-7 CLASSES: HCPCS

## 2023-08-15 PROCEDURE — 80179 DRUG ASSAY SALICYLATE: CPT

## 2023-08-15 PROCEDURE — 84484 ASSAY OF TROPONIN QUANT: CPT

## 2023-08-15 PROCEDURE — 70450 CT HEAD/BRAIN W/O DYE: CPT

## 2023-08-15 PROCEDURE — 80143 DRUG ASSAY ACETAMINOPHEN: CPT

## 2023-08-15 PROCEDURE — 85025 COMPLETE CBC W/AUTO DIFF WBC: CPT

## 2023-08-15 PROCEDURE — 87636 SARSCOV2 & INF A&B AMP PRB: CPT

## 2023-08-15 PROCEDURE — 93010 ELECTROCARDIOGRAM REPORT: CPT | Performed by: INTERNAL MEDICINE

## 2023-08-15 PROCEDURE — 80307 DRUG TEST PRSMV CHEM ANLYZR: CPT

## 2023-08-15 PROCEDURE — 71045 X-RAY EXAM CHEST 1 VIEW: CPT

## 2023-08-15 NOTE — ED PROVIDER NOTES
ATTENDING PROVIDER ATTESTATION:     Dipak Cleveland presented to the emergency department for evaluation of Other (Per EMS Pt was seen here 3 weeks ago. Increased one of his meds. Pt is now dizzy. Chronic depression. Denies SI and HI. Here because he feels dizzy. )   and was initially evaluated by the Medical Resident. See Original ED Note for H&P and ED course above. I have reviewed and discussed the case, including pertinent history (medical, surgical, family and social) and exam findings with the Medical Resident assigned to Dipak Cleveland. I have personally performed and/or participated in the history, exam, medical decision making, and procedures and agree with all pertinent clinical information and any additional changes or corrections are noted below in my assessment and plan. I have discussed this patient in detail with the resident, and provided the instruction and education,       I have reviewed my findings and recommendations with the assigned Medical Resident, Dipak Cleveland and members of family present at the time of disposition. I have performed a history and physical examination of this patient and directly supervised the resident caring for this patient              [facility]        Pt Name: Dipak Cleveland  MRN: [id number]  [address] [facility] 1950  Date of evaluation: 8/15/2023  Provider: Jacqui Larios MD  PCP: Zaira Lyn MD  Note Started: 12:36 PM EDT 8/15/23    CHIEF COMPLAINT       Chief Complaint   Patient presents with    Other     Per EMS Pt was seen here 3 weeks ago. Increased one of his meds. Pt is now dizzy. Chronic depression. Denies SI and HI. Here because he feels dizzy. HISTORY OF PRESENT ILLNESS: 1 or more Elements     Limitations to history : None    Dipak Cleveland is a 68 y.o. male who presents for suicidal ideation. Depression with suicidal ideation.  Says his
ETHANOL,ETHA <06   Salicylate Lvl <3.2   Toxic Tricyclic Sc,Blood NEGATIVE [PP]   6054 Patient is medically cleared for social work evaluation. [PP]   1525 Urine Drug Screen:    Amphetamine Screen, Ur NEGATIVE   Barbiturate Screen, Ur NEGATIVE   Benzodiazepine Screen, Urine NEGATIVE   Cocaine Metabolite, Urine NEGATIVE   METHADONE SCREEN, URINE, 04122 NEGATIVE   Opiates, Urine NEGATIVE   Phencyclidine, Urine NEGATIVE   Cannabinoid Scrn, Ur NEGATIVE   Oxycodone Screen, Ur NEGATIVE   Fentanyl, Ur NEGATIVE   Buprenorphine Urine NEGATIVE   TEST INFORMATION These drug screen results are for medical purposes only and should not be considered definitive or confirmed. [PP]   7407 The following tests were interpreted by me:       [CD]   1603 Urine Drug Screen:    Amphetamine Screen, Ur NEGATIVE   Barbiturate Screen, Ur NEGATIVE   Benzodiazepine Screen, Urine NEGATIVE   Cocaine Metabolite, Urine NEGATIVE   METHADONE SCREEN, URINE, 00285 NEGATIVE   Opiates, Urine NEGATIVE   Phencyclidine, Urine NEGATIVE   Cannabinoid Scrn, Ur NEGATIVE   Oxycodone Screen, Ur NEGATIVE   Fentanyl, Ur NEGATIVE   Buprenorphine Urine NEGATIVE   TEST INFORMATION These drug screen results are for medical purposes only and should not be considered definitive or confirmed.  [CD]   1603 Serum Drug Screen(!):    Acetaminophen Level <5(!)   ETHANOL,ETHA <64   Salicylate Lvl <8.1   Toxic Tricyclic Sc,Blood NEGATIVE [CD]   1603 Troponin(!):    Troponin, High Sensitivity 16(!) [CD]   1603 CMP(!):    Glucose, Random 116(!)   BUN,BUNPL 13   Creatinine 1.4(!)   Est, Glom Filt Rate 55(!)   CALCIUM, SERUM, 237811 9.6   Sodium 141   Potassium 4.4   Chloride 101   CO2 25   Anion Gap 15   Alk Phos 91   ALT 8   AST 19   BILIRUBIN TOTAL 0.8   Total Protein 6.9   Albumin 4.5 [CD]   1603 CBC with Auto Differential(!):    WBC 5.3   RBC 4.78   Hemoglobin Quant 14.9   Hematocrit 45.9   MCV 96.0   MCH 31.2   MCHC 32.5   RDW 13.5   Platelet Count 322   MPV 9.6   Neutrophils

## 2023-08-16 VITALS
TEMPERATURE: 97.9 F | RESPIRATION RATE: 18 BRPM | DIASTOLIC BLOOD PRESSURE: 88 MMHG | OXYGEN SATURATION: 96 % | SYSTOLIC BLOOD PRESSURE: 122 MMHG | HEART RATE: 81 BPM

## 2023-08-16 NOTE — ED NOTES
Called [facility] for an updated ETA. Updated ETA for arrival 15-20 minutes.       Bonita Ashby  08/16/23 3362
I called the [address] [facility],[phone], spoke to Royce Stewart then left a message at the transfer center with pt's information and requested a call back    Faxed chart to [phone]    Awaiting call back     RONNI Arndt  08/15/23 9796
JOHNIE received a call from Logan Guzman at the [place] and requested COVID and [place] to be faxed to [phone] to review. SW awaiting a call back. YOU Lucero, W  08/15/23 2110    JOHNIE spoke to Logan Guzman from the [place]. JOHNIE was informed Pt has been accepted to [facility] by Dr Zaria Torres. N2N is to be called to [phone] ext [phone]. Pt is to go into the ED department. JOHNIE reached out to [facility] at [phone] to arrange for transportation. JOHNIE spoke to Maicol Nassar and was informed due to the Holden of our crew we are unable to transport at night anymore. \" JOHNIE clarified this is only for psych patients. JOHNIE was given an ETA of 9AM.     JOHNIE reached out to [facility] at [phone] and attempt to out source. JOHNIE spoke to [facility] who reported they have no open units in the [facility]. JOHNIE reached out to [facility] at [phone] and attempt to out source. No answer. JOHNIE updated Logan Guzman on ETA.          YOU Lucero, [facility]  08/15/23 8362
Nichole Bedoya called and requested referral pack be re-faxed to [phone] as they have misplaced the one from yesterday.          Hellen Rdz, MSW, LSW  08/16/23 4767
Patient belongings labeled and stored into [facility] locker #29. 2 bags.       Jennifer Julio  08/15/23 1207
[facility] to [facility] patient     Elias Reynolds RN  08/16/23 9024
Pt approached SW desk and an update was provided. Pt is agreeable to review for admission here if [facility] has no beds.       YOU Paulson, [place]  08/15/23 7840
Pt given a breakfast tray     [facility]  08/16/23 0809
Pt given a dinner tray     Kristofer Subramanian  08/15/23 8012
Pt given a lunch tray     [facility]  08/15/23 6271
[facility]     George Ron RN  08/16/23 3676
SW reached out to the [place] at [phone] ext [phone] and spoke to bed coordinator. SW provided demographic information. Awaiting a call back from a care provider to further discuss bed status for possible admission.           YOU Johnson, [place]  08/15/23 2037
Transfer/ambulance packet completed and ready for transport.       YOU Lucero, [address]  08/16/23 3273
Voicemail #2 to [facility], advising that the chart has been faxed and requested another call back     RONNI Elaine  08/15/23 4977
Several MH hospitalization  Gender risk of suicide (male over 61)  Family hx of MH and alcohol abuse   Suicide attempts  Conflict w wife  MH diagnoses      Protective Factors:                 social connectedness to family  positive rapport with [address] providers & PCP/ medication compliant  help-seeking behavior   Steady income  goals & hope for the future  has access to essential needs  good communication skills  no access to weapons     Suicidal Ideations:  [x] Reports:               [x] Past [x] Present   [] Denies     Suicide Attempts:  [x] Reports:  3 attempts in last 3 yrs- last one was 12-18 mo ago- told self Jan 2021 drank antifreeze- told on self- went to med unit- OD pills 2 yrs ago- told on self  [] Denies     C-SSRS Screening Completed by RN: Current Suicide Risk:  [x] No Risk [] Low [] Moderate [] High     Homicidal Ideations:  [] Reports:              [] Past [] Present   [x] Denies      Self Injurious/Self Mutilation Behaviors:  [] Reports:               [] Past [] Present   [x] Denies     Hallucinations/Delusions:  [] Reports:   [x] Denies      Substance Use/Alcohol Use/Addiction:  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 Sober 42 years     Current or Past Substance Abuse Treatment:  [] Yes, When and Where:  [x] No     Current or Past Mental Health Treatment:  [x] Yes, When and Where: [facility] - counseling with   [] No     Legal Issues:  []  Yes (Specify)  [x]  No     Access to Weapons:  []  Yes (Specify)  [x]  No     Trauma History:  [] Reports:  [x] Denies      Living Situation:   Living w wife and dog     Employment:  Retired [address] [place] [address],42-10     Education Level:  Grad HS     Violence Risk Screening:        Have you ever thought about hurting someone? [x]  No  []  Yes (Ask the questions listed below)   When? Did you follow through with the thoughts? [] No     [] Yes- When and what happened?   2.         Have you ever

## 2023-09-05 ENCOUNTER — HOSPITAL ENCOUNTER (EMERGENCY)
Age: 73
Discharge: PSYCHIATRIC OR SUBSTANCE USE FACILITY | End: 2023-09-06
Attending: EMERGENCY MEDICINE
Payer: OTHER GOVERNMENT

## 2023-09-05 DIAGNOSIS — F39 MOOD DISORDER (HCC): Primary | ICD-10-CM

## 2023-09-05 LAB
ALBUMIN SERPL-MCNC: 4.3 G/DL (ref 3.5–5.2)
ALP SERPL-CCNC: 90 U/L (ref 40–129)
ALT SERPL-CCNC: 11 U/L (ref 0–40)
ANION GAP SERPL CALCULATED.3IONS-SCNC: 10 MMOL/L (ref 7–16)
APAP SERPL-MCNC: <5 UG/ML (ref 10–30)
AST SERPL-CCNC: 18 U/L (ref 0–39)
BASOPHILS # BLD: 0.03 K/UL (ref 0–0.2)
BASOPHILS NFR BLD: 0 % (ref 0–2)
BILIRUB SERPL-MCNC: 0.3 MG/DL (ref 0–1.2)
BILIRUB UR QL STRIP: NEGATIVE
BUN SERPL-MCNC: 9 MG/DL (ref 6–23)
CALCIUM SERPL-MCNC: 9.1 MG/DL (ref 8.6–10.2)
CHLORIDE SERPL-SCNC: 106 MMOL/L (ref 98–107)
CLARITY UR: CLEAR
CO2 SERPL-SCNC: 27 MMOL/L (ref 22–29)
COLOR UR: YELLOW
COMMENT: ABNORMAL
CREAT SERPL-MCNC: 1.4 MG/DL (ref 0.7–1.2)
EOSINOPHIL # BLD: 0.22 K/UL (ref 0.05–0.5)
EOSINOPHILS RELATIVE PERCENT: 3 % (ref 0–6)
ERYTHROCYTE [DISTWIDTH] IN BLOOD BY AUTOMATED COUNT: 13.7 % (ref 11.5–15)
ETHANOLAMINE SERPL-MCNC: <10 MG/DL
GFR SERPL CREATININE-BSD FRML MDRD: 55 ML/MIN/1.73M2
GLUCOSE SERPL-MCNC: 79 MG/DL (ref 74–99)
GLUCOSE UR STRIP-MCNC: NEGATIVE MG/DL
HCT VFR BLD AUTO: 41 % (ref 37–54)
HGB BLD-MCNC: 13.5 G/DL (ref 12.5–16.5)
HGB UR QL STRIP.AUTO: NEGATIVE
IMM GRANULOCYTES # BLD AUTO: <0.03 K/UL (ref 0–0.58)
IMM GRANULOCYTES NFR BLD: 0 % (ref 0–5)
KETONES UR STRIP-MCNC: ABNORMAL MG/DL
LEUKOCYTE ESTERASE UR QL STRIP: NEGATIVE
LYMPHOCYTES NFR BLD: 1.63 K/UL (ref 1.5–4)
LYMPHOCYTES RELATIVE PERCENT: 24 % (ref 20–42)
MCH RBC QN AUTO: 31.6 PG (ref 26–35)
MCHC RBC AUTO-ENTMCNC: 32.9 G/DL (ref 32–34.5)
MCV RBC AUTO: 96 FL (ref 80–99.9)
MONOCYTES NFR BLD: 0.69 K/UL (ref 0.1–0.95)
MONOCYTES NFR BLD: 10 % (ref 2–12)
NEUTROPHILS NFR BLD: 62 % (ref 43–80)
NEUTS SEG NFR BLD: 4.21 K/UL (ref 1.8–7.3)
NITRITE UR QL STRIP: NEGATIVE
PH UR STRIP: 6 [PH] (ref 5–9)
PLATELET # BLD AUTO: 228 K/UL (ref 130–450)
PMV BLD AUTO: 9.4 FL (ref 7–12)
POTASSIUM SERPL-SCNC: 3.6 MMOL/L (ref 3.5–5)
PROT SERPL-MCNC: 6.5 G/DL (ref 6.4–8.3)
PROT UR STRIP-MCNC: NEGATIVE MG/DL
RBC # BLD AUTO: 4.27 M/UL (ref 3.8–5.8)
SALICYLATES SERPL-MCNC: <0.3 MG/DL (ref 0–30)
SODIUM SERPL-SCNC: 143 MMOL/L (ref 132–146)
SP GR UR STRIP: 1.02 (ref 1–1.03)
TOXIC TRICYCLIC SC,BLOOD: NEGATIVE
UROBILINOGEN UR STRIP-ACNC: 0.2 EU/DL (ref 0–1)
WBC OTHER # BLD: 6.8 K/UL (ref 4.5–11.5)

## 2023-09-05 PROCEDURE — 80307 DRUG TEST PRSMV CHEM ANLYZR: CPT

## 2023-09-05 PROCEDURE — G0480 DRUG TEST DEF 1-7 CLASSES: HCPCS

## 2023-09-05 PROCEDURE — 80143 DRUG ASSAY ACETAMINOPHEN: CPT

## 2023-09-05 PROCEDURE — 85025 COMPLETE CBC W/AUTO DIFF WBC: CPT

## 2023-09-05 PROCEDURE — 80179 DRUG ASSAY SALICYLATE: CPT

## 2023-09-05 PROCEDURE — 93005 ELECTROCARDIOGRAM TRACING: CPT | Performed by: EMERGENCY MEDICINE

## 2023-09-05 PROCEDURE — 81003 URINALYSIS AUTO W/O SCOPE: CPT

## 2023-09-05 PROCEDURE — 80053 COMPREHEN METABOLIC PANEL: CPT

## 2023-09-05 PROCEDURE — 99285 EMERGENCY DEPT VISIT HI MDM: CPT

## 2023-09-06 VITALS
TEMPERATURE: 97.5 F | OXYGEN SATURATION: 99 % | RESPIRATION RATE: 16 BRPM | HEART RATE: 83 BPM | DIASTOLIC BLOOD PRESSURE: 75 MMHG | SYSTOLIC BLOOD PRESSURE: 113 MMHG

## 2023-09-06 LAB
AMPHET UR QL SCN: NEGATIVE
BARBITURATES UR QL SCN: NEGATIVE
BENZODIAZ UR QL: NEGATIVE
BUPRENORPHINE UR QL: NEGATIVE
CANNABINOIDS UR QL SCN: NEGATIVE
COCAINE UR QL SCN: NEGATIVE
EKG ATRIAL RATE: 73 BPM
EKG P AXIS: 67 DEGREES
EKG P-R INTERVAL: 156 MS
EKG Q-T INTERVAL: 410 MS
EKG QRS DURATION: 78 MS
EKG QTC CALCULATION (BAZETT): 451 MS
EKG R AXIS: 61 DEGREES
EKG T AXIS: 61 DEGREES
EKG VENTRICULAR RATE: 73 BPM
FENTANYL UR QL: NEGATIVE
FLUAV RNA RESP QL NAA+PROBE: NOT DETECTED
FLUBV RNA RESP QL NAA+PROBE: NOT DETECTED
METHADONE UR QL: NEGATIVE
OPIATES UR QL SCN: NEGATIVE
OXYCODONE UR QL SCN: NEGATIVE
PCP UR QL SCN: NEGATIVE
SARS-COV-2 RNA RESP QL NAA+PROBE: NOT DETECTED
SOURCE: NORMAL
SPECIMEN DESCRIPTION: NORMAL
TEST INFORMATION: NORMAL

## 2023-09-06 PROCEDURE — 93010 ELECTROCARDIOGRAM REPORT: CPT | Performed by: INTERNAL MEDICINE

## 2023-09-06 PROCEDURE — 87636 SARSCOV2 & INF A&B AMP PRB: CPT

## 2023-09-06 ASSESSMENT — PAIN - FUNCTIONAL ASSESSMENT: PAIN_FUNCTIONAL_ASSESSMENT: NONE - DENIES PAIN

## 2023-09-06 NOTE — ED NOTES
Behavioral Health Crisis Assessment      Chief Complaint: Suicidal thoughts, Melisa Roy are always there, every day. \"    Mental Status Exam: Alert/oriented to all spheres. Pt had flat affect. Pt soft-spoken, requires hearing aids. Pt was calm and cooperative, no psychomotor agitation. Pt kept appropriate eye contact. Pt denied having any hallucinations. Pt acknowledged having SI, is thinking of ways he can kill himself (didn't note plan). Pt denied having HI. Legal Status:  [] Voluntary:   [x] Involuntary, Issued by: [facility] ED doctor    Gender:  [x] Male [] Female [] Transgender  [] Other    Sexual Orientation:  [x] Heterosexual [] Homosexual [] Bisexual [] Other    Brief Clinical Summary:    Pt is a 68year old  male who presented as a walk-in to the ED. Pt reported he had a \"blow out\" with his wife of 23 years. Pt believes \"it (marriage) is over. \" Pt admitted there has been ongoing strife in the marriage. Pt noted there is more to his presenting to the ED tonight. Pt reported feeling helpless and hopeless, increased depression and anxiety. Pt noted he has been compliant with his medications. Pt stated his wife was the one who made sure he took his medications. Pt feels his well-being will decline further due to the break up. Pt noted not knowing where he will even stay. Pt stated he has been diagnosed with major depressive disorder. Pt noted having current SI, when asked about a plan, pt shook his head affirmatively (but did not identify a plan). Pt went on to noted his history of 3 \"severe\" attempts. Pt said he last overdosed several years ago. Pt said he drank antifreeze \"right before covid. \" Pt was unable to remember his first attempt. Pt reported having a history of multiple hospitalizations (several at [facility], last was 7/14/23), last hospitalization being 8/15/23 at [facility]. Pt was asked about placement preference and noted he would be satisfied with referral to [facility] at this time vs. [facility].     Pt

## 2023-09-06 NOTE — ED NOTES
Spoke to Dr. Nevaeh Feliciano who declined to admit. Social workers Darrel and Desmond-DAKSHA notified.        Vera Schuler RN  09/06/23 1800

## 2023-09-06 NOTE — ED NOTES
SW spoke to [employer] from the [facility] and completed referral.      YOU Josue, [facility]  09/06/23 3945

## 2023-09-06 NOTE — ED NOTES
states that this patient is ready to be reviewed for admission but that she is about to evaluate another patient. This RN will wait until next  assessment is completed in order to cluster calls to physician during off shift.         Adrianna Cruz RN  09/06/23 0844

## 2023-09-06 NOTE — ED NOTES
Dr. Carmen Lucero accepted pt to the [facility]    Berta Bridges will provide transport through [facility] - call after he leaves      Bernie Birch, RONNI  09/06/23 1021

## 2023-10-23 ENCOUNTER — HOSPITAL ENCOUNTER (INPATIENT)
Age: 73
LOS: 7 days | Discharge: HOME | DRG: 885 | End: 2023-10-30
Attending: EMERGENCY MEDICINE | Admitting: PSYCHIATRY & NEUROLOGY
Payer: MEDICARE

## 2023-10-23 DIAGNOSIS — R45.851 SUICIDAL IDEATION: Primary | ICD-10-CM

## 2023-10-23 PROBLEM — F39 MOOD DISORDER (HCC): Status: ACTIVE | Noted: 2023-10-23

## 2023-10-23 LAB
ALBUMIN SERPL-MCNC: 4.1 G/DL (ref 3.5–5.2)
ALP SERPL-CCNC: 82 U/L (ref 40–129)
ALT SERPL-CCNC: 13 U/L (ref 0–40)
AMPHET UR QL SCN: NEGATIVE
ANION GAP SERPL CALCULATED.3IONS-SCNC: 13 MMOL/L (ref 7–16)
APAP SERPL-MCNC: <5 UG/ML (ref 10–30)
AST SERPL-CCNC: 20 U/L (ref 0–39)
BARBITURATES UR QL SCN: NEGATIVE
BASOPHILS # BLD: 0.03 K/UL (ref 0–0.2)
BASOPHILS NFR BLD: 1 % (ref 0–2)
BENZODIAZ UR QL: NEGATIVE
BILIRUB SERPL-MCNC: 0.5 MG/DL (ref 0–1.2)
BILIRUB UR QL STRIP: NEGATIVE
BUN SERPL-MCNC: 10 MG/DL (ref 6–23)
BUPRENORPHINE UR QL: NEGATIVE
CALCIUM SERPL-MCNC: 9.3 MG/DL (ref 8.6–10.2)
CANNABINOIDS UR QL SCN: NEGATIVE
CHLORIDE SERPL-SCNC: 105 MMOL/L (ref 98–107)
CLARITY UR: CLEAR
CO2 SERPL-SCNC: 24 MMOL/L (ref 22–29)
COCAINE UR QL SCN: NEGATIVE
COLOR UR: YELLOW
COMMENT: NORMAL
CREAT SERPL-MCNC: 1.3 MG/DL (ref 0.7–1.2)
EOSINOPHIL # BLD: 0.23 K/UL (ref 0.05–0.5)
EOSINOPHILS RELATIVE PERCENT: 4 % (ref 0–6)
ERYTHROCYTE [DISTWIDTH] IN BLOOD BY AUTOMATED COUNT: 13.4 % (ref 11.5–15)
ETHANOLAMINE SERPL-MCNC: <10 MG/DL
FENTANYL UR QL: NEGATIVE
FLUAV RNA RESP QL NAA+PROBE: NOT DETECTED
FLUBV RNA RESP QL NAA+PROBE: NOT DETECTED
GFR SERPL CREATININE-BSD FRML MDRD: 56 ML/MIN/1.73M2
GLUCOSE SERPL-MCNC: 83 MG/DL (ref 74–99)
GLUCOSE UR STRIP-MCNC: NEGATIVE MG/DL
HCT VFR BLD AUTO: 40.7 % (ref 37–54)
HGB BLD-MCNC: 13.2 G/DL (ref 12.5–16.5)
HGB UR QL STRIP.AUTO: NEGATIVE
IMM GRANULOCYTES # BLD AUTO: <0.03 K/UL (ref 0–0.58)
IMM GRANULOCYTES NFR BLD: 0 % (ref 0–5)
KETONES UR STRIP-MCNC: NEGATIVE MG/DL
LEUKOCYTE ESTERASE UR QL STRIP: NEGATIVE
LYMPHOCYTES NFR BLD: 1.31 K/UL (ref 1.5–4)
LYMPHOCYTES RELATIVE PERCENT: 21 % (ref 20–42)
MCH RBC QN AUTO: 31.4 PG (ref 26–35)
MCHC RBC AUTO-ENTMCNC: 32.4 G/DL (ref 32–34.5)
MCV RBC AUTO: 96.9 FL (ref 80–99.9)
METHADONE UR QL: NEGATIVE
MONOCYTES NFR BLD: 0.67 K/UL (ref 0.1–0.95)
MONOCYTES NFR BLD: 11 % (ref 2–12)
NEUTROPHILS NFR BLD: 64 % (ref 43–80)
NEUTS SEG NFR BLD: 3.98 K/UL (ref 1.8–7.3)
NITRITE UR QL STRIP: NEGATIVE
OPIATES UR QL SCN: NEGATIVE
OXYCODONE UR QL SCN: NEGATIVE
PCP UR QL SCN: NEGATIVE
PH UR STRIP: 6 [PH] (ref 5–9)
PLATELET # BLD AUTO: 226 K/UL (ref 130–450)
PMV BLD AUTO: 9.3 FL (ref 7–12)
POTASSIUM SERPL-SCNC: 4.1 MMOL/L (ref 3.5–5)
PROT SERPL-MCNC: 6.5 G/DL (ref 6.4–8.3)
PROT UR STRIP-MCNC: NEGATIVE MG/DL
RBC # BLD AUTO: 4.2 M/UL (ref 3.8–5.8)
SALICYLATES SERPL-MCNC: <0.3 MG/DL (ref 0–30)
SARS-COV-2 RNA RESP QL NAA+PROBE: NOT DETECTED
SODIUM SERPL-SCNC: 142 MMOL/L (ref 132–146)
SOURCE: NORMAL
SP GR UR STRIP: 1.01 (ref 1–1.03)
SPECIMEN DESCRIPTION: NORMAL
TEST INFORMATION: NORMAL
TOXIC TRICYCLIC SC,BLOOD: NEGATIVE
TSH SERPL DL<=0.05 MIU/L-ACNC: 3.1 UIU/ML (ref 0.27–4.2)
UROBILINOGEN UR STRIP-ACNC: 0.2 EU/DL (ref 0–1)
WBC OTHER # BLD: 6.2 K/UL (ref 4.5–11.5)

## 2023-10-23 PROCEDURE — 93005 ELECTROCARDIOGRAM TRACING: CPT | Performed by: EMERGENCY MEDICINE

## 2023-10-23 PROCEDURE — 80307 DRUG TEST PRSMV CHEM ANLYZR: CPT

## 2023-10-23 PROCEDURE — 81003 URINALYSIS AUTO W/O SCOPE: CPT

## 2023-10-23 PROCEDURE — 1240000000 HC EMOTIONAL WELLNESS R&B

## 2023-10-23 PROCEDURE — 80179 DRUG ASSAY SALICYLATE: CPT

## 2023-10-23 PROCEDURE — 85025 COMPLETE CBC W/AUTO DIFF WBC: CPT

## 2023-10-23 PROCEDURE — 99285 EMERGENCY DEPT VISIT HI MDM: CPT

## 2023-10-23 PROCEDURE — G0480 DRUG TEST DEF 1-7 CLASSES: HCPCS

## 2023-10-23 PROCEDURE — 80053 COMPREHEN METABOLIC PANEL: CPT

## 2023-10-23 PROCEDURE — 87636 SARSCOV2 & INF A&B AMP PRB: CPT

## 2023-10-23 PROCEDURE — 80143 DRUG ASSAY ACETAMINOPHEN: CPT

## 2023-10-23 PROCEDURE — 84443 ASSAY THYROID STIM HORMONE: CPT

## 2023-10-23 RX ORDER — ACETAMINOPHEN 325 MG/1
650 TABLET ORAL EVERY 4 HOURS PRN
Status: DISCONTINUED | OUTPATIENT
Start: 2023-10-23 | End: 2023-10-30 | Stop reason: HOSPADM

## 2023-10-23 RX ORDER — MAGNESIUM HYDROXIDE/ALUMINUM HYDROXICE/SIMETHICONE 120; 1200; 1200 MG/30ML; MG/30ML; MG/30ML
30 SUSPENSION ORAL PRN
Status: DISCONTINUED | OUTPATIENT
Start: 2023-10-23 | End: 2023-10-30 | Stop reason: HOSPADM

## 2023-10-23 RX ORDER — HYDROXYZINE PAMOATE 25 MG/1
25 CAPSULE ORAL 3 TIMES DAILY PRN
Status: DISCONTINUED | OUTPATIENT
Start: 2023-10-23 | End: 2023-10-30 | Stop reason: HOSPADM

## 2023-10-23 RX ORDER — LANOLIN ALCOHOL/MO/W.PET/CERES
3 CREAM (GRAM) TOPICAL NIGHTLY PRN
Status: DISCONTINUED | OUTPATIENT
Start: 2023-10-23 | End: 2023-10-30 | Stop reason: HOSPADM

## 2023-10-23 RX ORDER — HALOPERIDOL 2 MG/1
3 TABLET ORAL EVERY 6 HOURS PRN
Status: DISCONTINUED | OUTPATIENT
Start: 2023-10-23 | End: 2023-10-30 | Stop reason: HOSPADM

## 2023-10-23 RX ORDER — HALOPERIDOL 5 MG/ML
3 INJECTION INTRAMUSCULAR EVERY 6 HOURS PRN
Status: DISCONTINUED | OUTPATIENT
Start: 2023-10-23 | End: 2023-10-30 | Stop reason: HOSPADM

## 2023-10-23 RX ORDER — NICOTINE 21 MG/24HR
1 PATCH, TRANSDERMAL 24 HOURS TRANSDERMAL DAILY
Status: DISCONTINUED | OUTPATIENT
Start: 2023-10-24 | End: 2023-10-30 | Stop reason: HOSPADM

## 2023-10-23 ASSESSMENT — PAIN - FUNCTIONAL ASSESSMENT: PAIN_FUNCTIONAL_ASSESSMENT: NONE - DENIES PAIN

## 2023-10-23 ASSESSMENT — LIFESTYLE VARIABLES
HOW OFTEN DO YOU HAVE A DRINK CONTAINING ALCOHOL: NEVER
HOW MANY STANDARD DRINKS CONTAINING ALCOHOL DO YOU HAVE ON A TYPICAL DAY: PATIENT DOES NOT DRINK
HOW OFTEN DO YOU HAVE A DRINK CONTAINING ALCOHOL: NEVER

## 2023-10-23 NOTE — ED NOTES
Pt belongings, 1 bag in [address] [facility] Alejandra Meredith Octavio  10/23/23 [address]
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
## 2023-10-24 PROBLEM — F60.3 BORDERLINE PERSONALITY DISORDER (HCC): Status: ACTIVE | Noted: 2022-12-17

## 2023-10-24 LAB
EKG ATRIAL RATE: 60 BPM
EKG P AXIS: 65 DEGREES
EKG P-R INTERVAL: 164 MS
EKG Q-T INTERVAL: 428 MS
EKG QRS DURATION: 80 MS
EKG QTC CALCULATION (BAZETT): 428 MS
EKG R AXIS: 63 DEGREES
EKG T AXIS: 58 DEGREES
EKG VENTRICULAR RATE: 60 BPM

## 2023-10-24 PROCEDURE — 6370000000 HC RX 637 (ALT 250 FOR IP): Performed by: NURSE PRACTITIONER

## 2023-10-24 PROCEDURE — 90792 PSYCH DIAG EVAL W/MED SRVCS: CPT | Performed by: NURSE PRACTITIONER

## 2023-10-24 PROCEDURE — 1240000000 HC EMOTIONAL WELLNESS R&B

## 2023-10-24 PROCEDURE — 93010 ELECTROCARDIOGRAM REPORT: CPT | Performed by: INTERNAL MEDICINE

## 2023-10-24 RX ORDER — CITALOPRAM 20 MG/1
10 TABLET ORAL NIGHTLY
Status: DISCONTINUED | OUTPATIENT
Start: 2023-10-24 | End: 2023-10-30 | Stop reason: HOSPADM

## 2023-10-24 RX ORDER — DONEPEZIL HYDROCHLORIDE 10 MG/1
10 TABLET, FILM COATED ORAL NIGHTLY
COMMUNITY

## 2023-10-24 RX ORDER — ATORVASTATIN CALCIUM 20 MG/1
20 TABLET, FILM COATED ORAL NIGHTLY
Status: ON HOLD | COMMUNITY
End: 2023-10-30 | Stop reason: HOSPADM

## 2023-10-24 RX ORDER — LITHIUM CARBONATE 300 MG/1
300 CAPSULE ORAL 2 TIMES DAILY WITH MEALS
Status: DISCONTINUED | OUTPATIENT
Start: 2023-10-24 | End: 2023-10-30 | Stop reason: HOSPADM

## 2023-10-24 RX ORDER — LITHIUM CARBONATE 300 MG
300 TABLET ORAL NIGHTLY
Status: ON HOLD | COMMUNITY
End: 2023-10-24

## 2023-10-24 RX ORDER — DOCUSATE SODIUM 100 MG/1
100 CAPSULE, LIQUID FILLED ORAL 2 TIMES DAILY
Status: DISCONTINUED | OUTPATIENT
Start: 2023-10-24 | End: 2023-10-30 | Stop reason: HOSPADM

## 2023-10-24 RX ORDER — DONEPEZIL HYDROCHLORIDE 5 MG/1
10 TABLET, FILM COATED ORAL NIGHTLY
Status: DISCONTINUED | OUTPATIENT
Start: 2023-10-24 | End: 2023-10-30 | Stop reason: HOSPADM

## 2023-10-24 RX ORDER — FOLIC ACID 1 MG/1
1 TABLET ORAL DAILY
COMMUNITY

## 2023-10-24 RX ORDER — LEVOTHYROXINE SODIUM 0.05 MG/1
50 TABLET ORAL DAILY
COMMUNITY

## 2023-10-24 RX ORDER — LITHIUM CARBONATE 450 MG
450 TABLET, EXTENDED RELEASE ORAL NIGHTLY
Status: ON HOLD | COMMUNITY
End: 2023-10-24

## 2023-10-24 RX ORDER — LEVOTHYROXINE SODIUM 0.05 MG/1
50 TABLET ORAL DAILY
Status: DISCONTINUED | OUTPATIENT
Start: 2023-10-24 | End: 2023-10-30 | Stop reason: HOSPADM

## 2023-10-24 RX ORDER — FOLIC ACID 1 MG/1
1 TABLET ORAL DAILY
Status: DISCONTINUED | OUTPATIENT
Start: 2023-10-24 | End: 2023-10-30 | Stop reason: HOSPADM

## 2023-10-24 RX ADMIN — LEVOTHYROXINE SODIUM 50 MCG: 0.05 TABLET ORAL at 12:49

## 2023-10-24 RX ADMIN — DOCUSATE SODIUM 100 MG: 100 CAPSULE, LIQUID FILLED ORAL at 20:55

## 2023-10-24 RX ADMIN — FOLIC ACID 1 MG: 1 TABLET ORAL at 12:49

## 2023-10-24 RX ADMIN — CITALOPRAM HYDROBROMIDE 10 MG: 20 TABLET ORAL at 20:55

## 2023-10-24 RX ADMIN — DOCUSATE SODIUM 100 MG: 100 CAPSULE, LIQUID FILLED ORAL at 12:49

## 2023-10-24 RX ADMIN — DONEPEZIL HYDROCHLORIDE 10 MG: 5 TABLET, FILM COATED ORAL at 20:55

## 2023-10-24 RX ADMIN — LITHIUM CARBONATE 300 MG: 300 CAPSULE, GELATIN COATED ORAL at 16:53

## 2023-10-24 ASSESSMENT — PATIENT HEALTH QUESTIONNAIRE - PHQ9
8. MOVING OR SPEAKING SO SLOWLY THAT OTHER PEOPLE COULD HAVE NOTICED. OR THE OPPOSITE, BEING SO FIGETY OR RESTLESS THAT YOU HAVE BEEN MOVING AROUND A LOT MORE THAN USUAL: 2
SUM OF ALL RESPONSES TO PHQ QUESTIONS 1-9: 12
4. FEELING TIRED OR HAVING LITTLE ENERGY: 2
5. POOR APPETITE OR OVEREATING: 1
6. FEELING BAD ABOUT YOURSELF - OR THAT YOU ARE A FAILURE OR HAVE LET YOURSELF OR YOUR FAMILY DOWN: 2
6. FEELING BAD ABOUT YOURSELF - OR THAT YOU ARE A FAILURE OR HAVE LET YOURSELF OR YOUR FAMILY DOWN: 1
SUM OF ALL RESPONSES TO PHQ QUESTIONS 1-9: 15
10. IF YOU CHECKED OFF ANY PROBLEMS, HOW DIFFICULT HAVE THESE PROBLEMS MADE IT FOR YOU TO DO YOUR WORK, TAKE CARE OF THINGS AT HOME, OR GET ALONG WITH OTHER PEOPLE: 2
SUM OF ALL RESPONSES TO PHQ QUESTIONS 1-9: 15
9. THOUGHTS THAT YOU WOULD BE BETTER OFF DEAD, OR OF HURTING YOURSELF: 2
SUM OF ALL RESPONSES TO PHQ QUESTIONS 1-9: 13
4. FEELING TIRED OR HAVING LITTLE ENERGY: 2
1. LITTLE INTEREST OR PLEASURE IN DOING THINGS: 2
SUM OF ALL RESPONSES TO PHQ9 QUESTIONS 1 & 2: 4
7. TROUBLE CONCENTRATING ON THINGS, SUCH AS READING THE NEWSPAPER OR WATCHING TELEVISION: 2
2. FEELING DOWN, DEPRESSED OR HOPELESS: 2
3. TROUBLE FALLING OR STAYING ASLEEP: 0
1. LITTLE INTEREST OR PLEASURE IN DOING THINGS: 2
SUM OF ALL RESPONSES TO PHQ9 QUESTIONS 1 & 2: 4
SUM OF ALL RESPONSES TO PHQ QUESTIONS 1-9: 15
SUM OF ALL RESPONSES TO PHQ QUESTIONS 1-9: 13
2. FEELING DOWN, DEPRESSED OR HOPELESS: 2
10. IF YOU CHECKED OFF ANY PROBLEMS, HOW DIFFICULT HAVE THESE PROBLEMS MADE IT FOR YOU TO DO YOUR WORK, TAKE CARE OF THINGS AT HOME, OR GET ALONG WITH OTHER PEOPLE: 2
3. TROUBLE FALLING OR STAYING ASLEEP: 1
7. TROUBLE CONCENTRATING ON THINGS, SUCH AS READING THE NEWSPAPER OR WATCHING TELEVISION: 1
SUM OF ALL RESPONSES TO PHQ QUESTIONS 1-9: 13
9. THOUGHTS THAT YOU WOULD BE BETTER OFF DEAD, OR OF HURTING YOURSELF: 1
5. POOR APPETITE OR OVEREATING: 1
SUM OF ALL RESPONSES TO PHQ QUESTIONS 1-9: 13
8. MOVING OR SPEAKING SO SLOWLY THAT OTHER PEOPLE COULD HAVE NOTICED. OR THE OPPOSITE, BEING SO FIGETY OR RESTLESS THAT YOU HAVE BEEN MOVING AROUND A LOT MORE THAN USUAL: 2

## 2023-10-24 ASSESSMENT — SLEEP AND FATIGUE QUESTIONNAIRES
DO YOU HAVE DIFFICULTY SLEEPING: NO
SLEEP PATTERN: DIFFICULTY FALLING ASLEEP
DO YOU USE A SLEEP AID: YES
SLEEP PATTERN: DIFFICULTY FALLING ASLEEP
AVERAGE NUMBER OF SLEEP HOURS: 8
DO YOU HAVE DIFFICULTY SLEEPING: NO
AVERAGE NUMBER OF SLEEP HOURS: 8
DO YOU USE A SLEEP AID: YES

## 2023-10-24 ASSESSMENT — PAIN SCALES - GENERAL
PAINLEVEL_OUTOF10: 0
PAINLEVEL_OUTOF10: 0

## 2023-10-24 NOTE — PLAN OF CARE
Problem: Self Harm/Suicidality  Goal: Will have no self-injury during hospital stay  Description: INTERVENTIONS:  1. Ensure constant observer at bedside with Q15M safety checks  2. Maintain a safe environment  3. Secure patient belongings  4. Ensure family/visitors adhere to safety recommendations  5. Ensure safety tray has been added to patient's diet order  6. Every shift and PRN: Re-assess suicidal risk via Frequent Screener    Outcome: Progressing     Problem: Depression  Goal: Will be euthymic at discharge  Description: INTERVENTIONS:  1. Administer medication as ordered  2. Provide emotional support via 1:1 interaction with staff  3. Encourage involvement in milieu/groups/activities  4. Monitor for social isolation  Outcome: Progressing     Problem: Psychosis  Goal: Will report no hallucinations or delusions  Description: INTERVENTIONS:  1. Administer medication as  ordered  2. Assist with reality testing to support increasing orientation  3. Assess if patient's hallucinations or delusions are encouraging self harm or harm to others and intervene as appropriate  Outcome: Progressing     Problem: Behavior  Goal: Pt/Family maintain appropriate behavior and adhere to behavioral management agreement, if implemented  Description: INTERVENTIONS:  1. Assess patient/family's coping skills and  non-compliant behavior (including use of illegal substances)  2. Notify security of behavior or suspected illegal substances which indicate the need for search of the family and/or belongings  3. Encourage verbalization of thoughts and concerns in a socially appropriate manner  4. Utilize positive, consistent limit setting strategies supporting safety of patient, staff and others  5. Encourage participation in the decision making process about the behavioral management agreement  6. If a visitor's behavior poses a threat to safety call refer to organization policy.   7. Initiate consult with , Psychosocial

## 2023-10-24 NOTE — DISCHARGE INSTRUCTIONS
Attending Provider: Juliana Camilo MD.     If you have any questions and need to contact this individual please call the unit at [phone]. [phone] Provider will be available on call 24/7 and during holidays. Reason for Admission: \"The pt stated that he drove himself to the ED after driving around looking for a bridge to drive off of.\"  He admitted that he did call his CM who he told he was in the parking lot and was suicidal.  He stated that he has been having SI off and on for 6 months. He stated that this episode has been going on for a week.   He reported a hx of 3 attempts starting 10 yrs ago but is a poor historian with regard to when and the details of the attempts

## 2023-10-24 NOTE — ED NOTES
Nurse to nurse report given. Nurse requested time before patient is sent upstairs. Charge RN notified and states patient will be placed in transport at [phone].      Elaine Guajardo RN  10/23/23 1968       Elaine Guajardo RN  10/24/23 5946

## 2023-10-24 NOTE — GROUP NOTE
Group Therapy Note    Date: 10/24/2023    Group Start Time: 1140  Group End Time: 5300  Group Topic: Psychoeducation    [facility] ACUTE BH 1    Yuan Gann                                                                        Group Therapy Note    Module Name:  prioritizing self care    Patient's Goal:  Patient will be able to id steps to setting boundaries to take care of his/her own needs. Notes:  Patient pleasant and engaged in group, able to share when prompted, and accepting of handout. Status After Intervention:  Improved    Participation Level:  Active Listener and Interactive    Participation Quality: Appropriate and Attentive      Speech:  normal      Thought Process/Content: Logical      Affective Functioning: Congruent      Mood: euthymic      Level of consciousness:  Alert, Oriented x4, and Attentive      Response to Learning: Able to verbalize/acknowledge new learning and Progressing to goal      Endings: None Reported    Modes of Intervention: Education, Support, Socialization, and Clarifying      Discipline Responsible: Psychoeducational Specialist      Signature:  Julia Rader
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
## 2023-10-24 NOTE — ED NOTES
The pt was accepted to [address] [facility]  [address] room 7302b. Disposition called to Abner Duke in admitting.        Mayur Mcguire, [facility]  10/23/23 8632

## 2023-10-24 NOTE — CARE COORDINATION
Clinicals faxed to [facility] transfer desk Attn Wander Valdez per her request for continuity of care.

## 2023-10-24 NOTE — H&P
AND     [x](2) The inpatient psychiatric services are provided while the individual is under the care of a physician and are included in the individualized plan of care.     Estimated length of stay/service  5 - 7 days based on stability     Plan for post-hospital care follow with OP provider     Electronically signed by RITA Chavis CNP on 10/24/2023 at 8:32 AM          Electronically signed by RITA Chavis CNP on 10/24/2023 at 8:32 AM

## 2023-10-25 PROCEDURE — 1240000000 HC EMOTIONAL WELLNESS R&B

## 2023-10-25 PROCEDURE — 6370000000 HC RX 637 (ALT 250 FOR IP): Performed by: NURSE PRACTITIONER

## 2023-10-25 PROCEDURE — 99232 SBSQ HOSP IP/OBS MODERATE 35: CPT | Performed by: NURSE PRACTITIONER

## 2023-10-25 RX ADMIN — DOCUSATE SODIUM 100 MG: 100 CAPSULE, LIQUID FILLED ORAL at 20:59

## 2023-10-25 RX ADMIN — LEVOTHYROXINE SODIUM 50 MCG: 0.05 TABLET ORAL at 06:10

## 2023-10-25 RX ADMIN — DONEPEZIL HYDROCHLORIDE 10 MG: 5 TABLET, FILM COATED ORAL at 20:59

## 2023-10-25 RX ADMIN — LITHIUM CARBONATE 300 MG: 300 CAPSULE, GELATIN COATED ORAL at 08:57

## 2023-10-25 RX ADMIN — CITALOPRAM HYDROBROMIDE 10 MG: 20 TABLET ORAL at 20:59

## 2023-10-25 RX ADMIN — FOLIC ACID 1 MG: 1 TABLET ORAL at 08:57

## 2023-10-25 RX ADMIN — DOCUSATE SODIUM 100 MG: 100 CAPSULE, LIQUID FILLED ORAL at 08:57

## 2023-10-25 RX ADMIN — LITHIUM CARBONATE 300 MG: 300 CAPSULE, GELATIN COATED ORAL at 18:01

## 2023-10-25 ASSESSMENT — PAIN SCALES - GENERAL: PAINLEVEL_OUTOF10: 0

## 2023-10-25 NOTE — GROUP NOTE
Group Therapy Note    Date: 10/25/2023    Group Start Time: 1400  Group End Time: 1430  Group Topic: Cognitive Skills    [facility] 7[facility] ACUTE [facility], [place], MSW, [facility]        Group Therapy Note    Attendees: 4       Patient's Goal:  pt will be able to identify different ways to resolve conflict within their relationships. Notes:  pt participated in group and made connections. Status After Intervention:  Improved    Participation Level:  Active Listener and Interactive    Participation Quality: Appropriate, Attentive, Sharing, and Supportive      Speech:  normal      Thought Process/Content: Logical  Linear      Affective Functioning: Congruent      Mood: anxious      Level of consciousness:  Alert, Oriented x4, and Attentive      Response to Learning: Able to verbalize current knowledge/experience, Able to verbalize/acknowledge new learning, Able to retain information, and Capable of insight      Endings: None Reported    Modes of Intervention: Education, Support, Socialization, Exploration, Clarifying, and Problem-solving      Discipline Responsible: /Counselor      Signature:  YOU Holguin, [place]

## 2023-10-25 NOTE — PLAN OF CARE
Problem: Depression  Goal: Will be euthymic at discharge  Description: INTERVENTIONS:  1. Administer medication as ordered  2. Provide emotional support via 1:1 interaction with staff  3. Encourage involvement in milieu/groups/activities  4. Monitor for social isolation  10/24/2023 2215 by Katiana Davis RN  Outcome: Progressing     Problem: Anxiety  Goal: Will report anxiety at manageable levels  Description: INTERVENTIONS:  1. Administer medication as ordered  2. Teach and rehearse alternative coping skills  3. Provide emotional support with 1:1 interaction with staff  10/24/2023 2215 by Katiana Davis RN  Outcome: Progressing     Problem: Behavior  Goal: Pt/Family maintain appropriate behavior and adhere to behavioral management agreement, if implemented  Description: INTERVENTIONS:  1. Assess patient/family's coping skills and  non-compliant behavior (including use of illegal substances)  2. Notify security of behavior or suspected illegal substances which indicate the need for search of the family and/or belongings  3. Encourage verbalization of thoughts and concerns in a socially appropriate manner  4. Utilize positive, consistent limit setting strategies supporting safety of patient, staff and others  5. Encourage participation in the decision making process about the behavioral management agreement  6. If a visitor's behavior poses a threat to safety call refer to organization policy.   7. Initiate consult with , Psychosocial CNS, Spiritual Care as appropriate  10/24/2023 2215 by Katiana Davis RN  Outcome: Progressing

## 2023-10-25 NOTE — GROUP NOTE
Group Therapy Note    Date: 10/25/2023    Group Start Time: 7732  Group End Time: 1800  Group Topic: Psychoeducation    [facility] 1    Malorie Tellez                                                                        Group Therapy Note    Date: 10/25/2023    Type of Group: Recreational    Wellness Binder Information  Module Name:  reminisce trivia   Patient's Goal:  Patient will be able to participate in decades trivia. Notes:  Pleasant and able to participate in trivia. Status After Intervention:  Improved    Participation Level:  Active Listener and Interactive    Participation Quality: Appropriate, Attentive, and Sharing      Speech:  normal      Thought Process/Content: Logical      Affective Functioning: Congruent      Mood: euthymic      Level of consciousness:  Alert, Oriented x4, and Attentive      Response to Learning: Able to verbalize/acknowledge new learning      Endings: None Reported    Modes of Intervention: Support, Socialization, and Activity      Discipline Responsible: Psychoeducational Specialist      Signature:  PAULY Tellez              Signature:  Sathya Mcmillan

## 2023-10-25 NOTE — GROUP NOTE
Group Therapy Note    Date: 10/25/2023    Group Start Time: 1120  Group End Time: 0852  Group Topic: Psychoeducation    [facility] AC[facility] 1    Ricarda Holter, [place]                                                                      Module Name:  dealing with anger and frustration   Patient's Goal:  patient will be able to id key steps to managing anger in the future. Notes:  pleasant and able to share in group. Accepting of handout and willing to identify what has helped them in the past.     Status After Intervention:  Improved    Participation Level:  Active Listener and Interactive    Participation Quality: Appropriate, Attentive, and Sharing      Speech:  normal      Thought Process/Content: Logical      Affective Functioning: Congruent      Mood: euthymic      Level of consciousness:  Alert, Oriented x4, and Attentive      Response to Learning: Able to verbalize/acknowledge new learning, Able to retain information, and Progressing to goal      Endings: None Reported    Modes of Intervention: Education, Support, Socialization, and Clarifying      Discipline Responsible: Psychoeducational Specialist      Signature:  Guy Gaffney

## 2023-10-25 NOTE — CARE COORDINATION
SW met with pt during treatment team. Pt was seen while he was laying in bed. Pt stated that he has no SI and that he slept good last night. Pt stated that he is going \"so so \" today and he is still planning on returning back home with his wife when he is discharged. NP informed pt that his discharge date is unknown at this time and the doctor is being cautious with the discharge due to his multiple hospitalizations and risk factors.

## 2023-10-26 PROCEDURE — 6370000000 HC RX 637 (ALT 250 FOR IP): Performed by: NURSE PRACTITIONER

## 2023-10-26 PROCEDURE — 1240000000 HC EMOTIONAL WELLNESS R&B

## 2023-10-26 PROCEDURE — 99232 SBSQ HOSP IP/OBS MODERATE 35: CPT | Performed by: NURSE PRACTITIONER

## 2023-10-26 RX ADMIN — LEVOTHYROXINE SODIUM 50 MCG: 0.05 TABLET ORAL at 06:11

## 2023-10-26 RX ADMIN — DOCUSATE SODIUM 100 MG: 100 CAPSULE, LIQUID FILLED ORAL at 10:43

## 2023-10-26 RX ADMIN — LITHIUM CARBONATE 300 MG: 300 CAPSULE, GELATIN COATED ORAL at 18:27

## 2023-10-26 RX ADMIN — DONEPEZIL HYDROCHLORIDE 10 MG: 5 TABLET, FILM COATED ORAL at 21:06

## 2023-10-26 RX ADMIN — FOLIC ACID 1 MG: 1 TABLET ORAL at 10:43

## 2023-10-26 RX ADMIN — CITALOPRAM HYDROBROMIDE 10 MG: 20 TABLET ORAL at 21:06

## 2023-10-26 RX ADMIN — DOCUSATE SODIUM 100 MG: 100 CAPSULE, LIQUID FILLED ORAL at 21:06

## 2023-10-26 RX ADMIN — LITHIUM CARBONATE 300 MG: 300 CAPSULE, GELATIN COATED ORAL at 10:42

## 2023-10-26 ASSESSMENT — PAIN SCALES - GENERAL: PAINLEVEL_OUTOF10: 0

## 2023-10-26 NOTE — PLAN OF CARE
Problem: Depression  Goal: Will be euthymic at discharge  Description: INTERVENTIONS:  1. Administer medication as ordered  2. Provide emotional support via 1:1 interaction with staff  3. Encourage involvement in milieu/groups/activities  4. Monitor for social isolation  Outcome: Progressing     Problem: Behavior  Goal: Pt/Family maintain appropriate behavior and adhere to behavioral management agreement, if implemented  Description: INTERVENTIONS:  1. Assess patient/family's coping skills and  non-compliant behavior (including use of illegal substances)  2. Notify security of behavior or suspected illegal substances which indicate the need for search of the family and/or belongings  3. Encourage verbalization of thoughts and concerns in a socially appropriate manner  4. Utilize positive, consistent limit setting strategies supporting safety of patient, staff and others  5. Encourage participation in the decision making process about the behavioral management agreement  6. If a visitor's behavior poses a threat to safety call refer to organization policy. 7. Initiate consult with , Psychosocial CNS, Spiritual Care as appropriate  Outcome: Progressing     Problem: Anxiety  Goal: Will report anxiety at manageable levels  Description: INTERVENTIONS:  1. Administer medication as ordered  2. Teach and rehearse alternative coping skills  3. Provide emotional support with 1:1 interaction with staff  Outcome: Progressing     Patient is seen out on the unit, isolative to self. Patient appears flat, sad, withdrawn. Rates anxiety a 7/10 and depression 5/10. Patient denies suicidal ideation, homicidal ideations and hallucinations. Friendly and cooperative during assessment. Medications taken without issue. No complaints or concerns verbalized at this time. No unit problems reported. Will continue to observe and support.

## 2023-10-26 NOTE — BH NOTE
[facility]  Day 3 Interdisciplinary Treatment Plan NOTE    Review Date & Time: 10/26/23 0945    Patient was not in treatment team    Estimated Length of Stay Update:  5-7 days  Estimated Discharge Date Update: 5-7 days    EDUCATION:   Learner Progress Toward Treatment Goals: Reviewed results and recommendations of this team, Reviewed group plan and strategies, Reviewed signs, symptoms and risk of self harm and violent behavior, and Reviewed goals and plan of care    Method: Small group    Outcome: Verbalized understanding    PATIENT GOALS: plan for what I'm going to do when I get out     PLAN/TREATMENT RECOMMENDATIONS UPDATE:Encourage patient to attend and participate in groups and take medications as prescribed.     GOALS UPDATE:   Time frame for Short-Term Goals: reassess daily      Cheryl Purvis RN

## 2023-10-26 NOTE — GROUP NOTE
Group Therapy Note    Date: 10/26/2023    Group Start Time: 1600  Group End Time: 6369  Group Topic: Recreational    [facility] ACUTE BH 2    Malorie Orourke                                                                        Group Therapy Note    Date: 10/26/2023  Start Time: 1600  End Time:  3021  Number of Participants: 6    Type of Group: Recreational    Wellness Binder Information  Module Name:  Sports Trivia    Patient's Goal:  Maintain/increase cognition through the use of trivia. Increase socialization amongst peers. Notes:  CTRS facilitated sports trivia with patient. Patient was an active participant in activity. Status After Intervention:  Improved    Participation Level:  Active Listener and Interactive    Participation Quality: Appropriate and Attentive      Speech:  normal      Thought Process/Content: Logical      Affective Functioning: Congruent      Mood: euthymic      Level of consciousness:  Alert and Attentive      Response to Learning: Able to verbalize current knowledge/experience and Able to verbalize/acknowledge new learning      Endings: None Reported    Modes of Intervention: Socialization and Activity      Discipline Responsible: Psychoeducational Specialist      Signature:  PAULY Orourke     Group Therapy Note    Attendees: 6

## 2023-10-26 NOTE — GROUP NOTE
Group Therapy Note    Date: 10/26/2023    Group Start Time: 1115  Group End Time: 1200  Group Topic: Psychoeducation    [facility] ACUTE BH 2    Malorie Vital                                                                        Group Therapy Note    Date: 10/26/2023  Start Time: 1115  End Time:  1200  Number of Participants: 7    Type of Group: Psychoeducation    Wellness Binder Information  Module Name:  5 Steps to wellbeing    Patient's Goal:  ID one or two ways to improve well being. Explore the different categories of what it takes to be well. Increase awareness of what it takes to be well through the different categories. Notes:  CTRS discussed five steps to well being, and provided examples. Patient was an active participant during discussion and was accepting of handout. Patient expressed that his dog ambar is his social support and also makes him happy. Status After Intervention:  Improved    Participation Level:  Active Listener and Interactive    Participation Quality: Appropriate and Attentive      Speech:  normal      Thought Process/Content: Logical      Affective Functioning: Congruent      Mood: anxious      Level of consciousness:  Alert and Attentive      Response to Learning: Able to verbalize current knowledge/experience, Able to verbalize/acknowledge new learning, and Able to retain information      Endings: None Reported    Modes of Intervention: Education, Support, and Exploration      Discipline Responsible: Psychoeducational Specialist      Signature:  Malorie Vital

## 2023-10-26 NOTE — GROUP NOTE
Group Therapy Note    Date: 10/26/2023    Group Start Time: 1400  Group End Time: 1430  Group Topic: Cognitive Skills    [facility] 7[facility] ACUTE [facility], Blanco Stevenson, MSW, LSW        Group Therapy Note    Attendees: 8       Patient's Goal:  pt will be able to identify a safe discharge plan and understand the discharge process. Notes:  pt participated in group and made connections. Pt stated that he would like information to be updated. Pt stated that he will go home with his wife. Pt stated that he will be his own transportation back home and he will follow up with the [address] [place] [address] mental health services. Pt stated that he would like to move to [facility] in the future or a [facility] home in [facility] and he has put in an application. Status After Intervention:  Improved    Participation Level:  Active Listener and Interactive    Participation Quality: Appropriate, Attentive, and Sharing      Speech:  normal      Thought Process/Content: Logical      Affective Functioning: Congruent      Mood: anxious      Level of consciousness:  Alert, Oriented x4, and Attentive      Response to Learning: Able to verbalize current knowledge/experience, Able to verbalize/acknowledge new learning, Able to retain information, and Capable of insight      Endings: None Reported    Modes of Intervention: Education, Support, Socialization, Exploration, Clarifying, and Problem-solving      Discipline Responsible: /Counselor      Signature:  YOU Ayala, [place]

## 2023-10-27 PROCEDURE — 6370000000 HC RX 637 (ALT 250 FOR IP): Performed by: NURSE PRACTITIONER

## 2023-10-27 PROCEDURE — 1240000000 HC EMOTIONAL WELLNESS R&B

## 2023-10-27 PROCEDURE — 99232 SBSQ HOSP IP/OBS MODERATE 35: CPT | Performed by: NURSE PRACTITIONER

## 2023-10-27 RX ADMIN — LITHIUM CARBONATE 300 MG: 300 CAPSULE, GELATIN COATED ORAL at 09:07

## 2023-10-27 RX ADMIN — DONEPEZIL HYDROCHLORIDE 10 MG: 5 TABLET, FILM COATED ORAL at 21:00

## 2023-10-27 RX ADMIN — DOCUSATE SODIUM 100 MG: 100 CAPSULE, LIQUID FILLED ORAL at 09:07

## 2023-10-27 RX ADMIN — FOLIC ACID 1 MG: 1 TABLET ORAL at 09:07

## 2023-10-27 RX ADMIN — DOCUSATE SODIUM 100 MG: 100 CAPSULE, LIQUID FILLED ORAL at 21:00

## 2023-10-27 RX ADMIN — CITALOPRAM HYDROBROMIDE 10 MG: 20 TABLET ORAL at 21:00

## 2023-10-27 RX ADMIN — LITHIUM CARBONATE 300 MG: 300 CAPSULE, GELATIN COATED ORAL at 16:30

## 2023-10-27 RX ADMIN — LEVOTHYROXINE SODIUM 50 MCG: 0.05 TABLET ORAL at 06:43

## 2023-10-27 NOTE — PLAN OF CARE
Problem: Self Harm/Suicidality  Goal: Will have no self-injury during hospital stay  Description: INTERVENTIONS:  1. Ensure constant observer at bedside with Q15M safety checks  2. Maintain a safe environment  3. Secure patient belongings  4. Ensure family/visitors adhere to safety recommendations  5. Ensure safety tray has been added to patient's diet order  6. Every shift and PRN: Re-assess suicidal risk via Frequent Screener    Outcome: Progressing     Problem: Depression  Goal: Will be euthymic at discharge  Description: INTERVENTIONS:  1. Administer medication as ordered  2. Provide emotional support via 1:1 interaction with staff  3. Encourage involvement in milieu/groups/activities  4. Monitor for social isolation  Outcome: Progressing     Problem: Maribeth  Goal: Will exhibit normal sleep and speech and no impulsivity  Description: INTERVENTIONS:  1. Administer medication as ordered  2. Set limits on impulsive behavior  3. Make attempts to decrease external stimuli as possible  Outcome: Progressing     Problem: Psychosis  Goal: Will report no hallucinations or delusions  Description: INTERVENTIONS:  1. Administer medication as  ordered  2. Assist with reality testing to support increasing orientation  3. Assess if patient's hallucinations or delusions are encouraging self harm or harm to others and intervene as appropriate  Outcome: Progressing     Problem: Behavior  Goal: Pt/Family maintain appropriate behavior and adhere to behavioral management agreement, if implemented  Description: INTERVENTIONS:  1. Assess patient/family's coping skills and  non-compliant behavior (including use of illegal substances)  2. Notify security of behavior or suspected illegal substances which indicate the need for search of the family and/or belongings  3. Encourage verbalization of thoughts and concerns in a socially appropriate manner  4.  Utilize positive, consistent limit setting strategies supporting safety of

## 2023-10-27 NOTE — GROUP NOTE
Group Therapy Note    Date: 10/27/2023    Group Start Time: 1400  Group End Time: 1500  Group Topic: Cognitive Skills    [facility] 7[facility] ACUTE [facility], [place], MSW, W        Group Therapy Note    Attendees: 8       Patient's Goal:  pt will be able to identity a song that makes their mood improve and discuss how music can alter their feelings. Notes:  pt participated in group and made connections. Status After Intervention:  Improved    Participation Level:  Active Listener and Interactive    Participation Quality: Appropriate, Attentive, Sharing, and Supportive      Speech:  normal      Thought Process/Content: Logical  Linear      Affective Functioning: Congruent      Mood: anxious      Level of consciousness:  Alert, Oriented x4, and Attentive      Response to Learning: Able to verbalize current knowledge/experience, Able to verbalize/acknowledge new learning, Able to retain information, and Capable of insight      Endings: None Reported    Modes of Intervention: Education, Support, Socialization, Exploration, Clarifying, and Problem-solving      Discipline Responsible: /Counselor      Signature:  YOU [place], [place]

## 2023-10-28 LAB
LITHIUM DATE LAST DOSE: NORMAL
LITHIUM DOSE AMOUNT: NORMAL
LITHIUM DOSE TIME: NORMAL
LITHIUM LEVEL: 0.5 MMOL/L (ref 0.5–1.5)

## 2023-10-28 PROCEDURE — 36415 COLL VENOUS BLD VENIPUNCTURE: CPT

## 2023-10-28 PROCEDURE — 80178 ASSAY OF LITHIUM: CPT

## 2023-10-28 PROCEDURE — 1240000000 HC EMOTIONAL WELLNESS R&B

## 2023-10-28 PROCEDURE — 6370000000 HC RX 637 (ALT 250 FOR IP): Performed by: PSYCHIATRY & NEUROLOGY

## 2023-10-28 PROCEDURE — 99232 SBSQ HOSP IP/OBS MODERATE 35: CPT | Performed by: NURSE PRACTITIONER

## 2023-10-28 PROCEDURE — 6370000000 HC RX 637 (ALT 250 FOR IP): Performed by: NURSE PRACTITIONER

## 2023-10-28 RX ADMIN — CITALOPRAM HYDROBROMIDE 10 MG: 20 TABLET ORAL at 20:57

## 2023-10-28 RX ADMIN — FOLIC ACID 1 MG: 1 TABLET ORAL at 09:26

## 2023-10-28 RX ADMIN — LITHIUM CARBONATE 300 MG: 300 CAPSULE, GELATIN COATED ORAL at 17:50

## 2023-10-28 RX ADMIN — ACETAMINOPHEN 650 MG: 325 TABLET ORAL at 09:25

## 2023-10-28 RX ADMIN — DOCUSATE SODIUM 100 MG: 100 CAPSULE, LIQUID FILLED ORAL at 20:57

## 2023-10-28 RX ADMIN — LEVOTHYROXINE SODIUM 50 MCG: 0.05 TABLET ORAL at 06:05

## 2023-10-28 RX ADMIN — HYDROXYZINE PAMOATE 25 MG: 25 CAPSULE ORAL at 09:26

## 2023-10-28 RX ADMIN — LITHIUM CARBONATE 300 MG: 300 CAPSULE, GELATIN COATED ORAL at 09:26

## 2023-10-28 RX ADMIN — DONEPEZIL HYDROCHLORIDE 10 MG: 5 TABLET, FILM COATED ORAL at 20:56

## 2023-10-28 RX ADMIN — DOCUSATE SODIUM 100 MG: 100 CAPSULE, LIQUID FILLED ORAL at 09:25

## 2023-10-28 NOTE — GROUP NOTE
Group Therapy Note    Date: 10/28/2023    Group Start Time: 1100  Group End Time: 1130  Group Topic: Psychoeducation    [facility] 7W ACUTE BH 2    Malorie Colby                                                                        Group Therapy Note    Date: 10/28/2023  Start Time: 1100  End Time:  1130  Number of Participants: 9    Type of Group: Psychoeducation    Name: Self-esteem    Patient's Goal: Increased awareness of influences of self-esteem. Identified healthy ways to increase self-esteem. Notes: Pt was actively engaged in group discussion and made positive contributions to discussion. Status After Intervention:  Improved    Participation Level:  Active Listener and Interactive    Participation Quality: Appropriate, Attentive, Sharing, and Supportive    Speech:  normal    Thought Process/Content: Logical    Affective Functioning: Congruent    Mood:  Appropriate    Level of consciousness:  Alert and Attentive    Response to Learning: Able to verbalize current knowledge/experience and Progressing to goal    Endings: None Reported    Modes of Intervention: Education    Discipline Responsible: Psychoeducational Specialist      Signature:  Malorie Colby

## 2023-10-28 NOTE — PLAN OF CARE
Problem: Depression  Goal: Will be euthymic at discharge  Description: INTERVENTIONS:  1. Administer medication as ordered  2. Provide emotional support via 1:1 interaction with staff  3. Encourage involvement in milieu/groups/activities  4. Monitor for social isolation  10/27/2023 2125 by Candy Manning RN  Outcome: Progressing     Problem: Anxiety  Goal: Will report anxiety at manageable levels  Description: INTERVENTIONS:  1. Administer medication as ordered  2. Teach and rehearse alternative coping skills  3. Provide emotional support with 1:1 interaction with staff  10/27/2023 2125 by Candy Manning RN  Outcome: Progressing     Problem: Behavior  Goal: Pt/Family maintain appropriate behavior and adhere to behavioral management agreement, if implemented  Description: INTERVENTIONS:  1. Assess patient/family's coping skills and  non-compliant behavior (including use of illegal substances)  2. Notify security of behavior or suspected illegal substances which indicate the need for search of the family and/or belongings  3. Encourage verbalization of thoughts and concerns in a socially appropriate manner  4. Utilize positive, consistent limit setting strategies supporting safety of patient, staff and others  5. Encourage participation in the decision making process about the behavioral management agreement  6. If a visitor's behavior poses a threat to safety call refer to organization policy.   7. Initiate consult with , Psychosocial CNS, Spiritual Care as appropriate  10/27/2023 2125 by Candy Manning RN  Outcome: Progressing

## 2023-10-28 NOTE — PLAN OF CARE
Denies S/HI  denies hallucinations   cooperative   mood is somewhat irritable    out on the unit but quiet and stays to self    cooperative with meds   attending groups   will continue to monitor

## 2023-10-29 PROCEDURE — 1240000000 HC EMOTIONAL WELLNESS R&B

## 2023-10-29 PROCEDURE — 6370000000 HC RX 637 (ALT 250 FOR IP): Performed by: NURSE PRACTITIONER

## 2023-10-29 PROCEDURE — 99231 SBSQ HOSP IP/OBS SF/LOW 25: CPT | Performed by: NURSE PRACTITIONER

## 2023-10-29 RX ADMIN — LEVOTHYROXINE SODIUM 50 MCG: 0.05 TABLET ORAL at 06:01

## 2023-10-29 RX ADMIN — LITHIUM CARBONATE 300 MG: 300 CAPSULE, GELATIN COATED ORAL at 09:13

## 2023-10-29 RX ADMIN — DONEPEZIL HYDROCHLORIDE 10 MG: 5 TABLET, FILM COATED ORAL at 20:50

## 2023-10-29 RX ADMIN — FOLIC ACID 1 MG: 1 TABLET ORAL at 09:13

## 2023-10-29 RX ADMIN — LITHIUM CARBONATE 300 MG: 300 CAPSULE, GELATIN COATED ORAL at 18:00

## 2023-10-29 RX ADMIN — DOCUSATE SODIUM 100 MG: 100 CAPSULE, LIQUID FILLED ORAL at 09:13

## 2023-10-29 RX ADMIN — DOCUSATE SODIUM 100 MG: 100 CAPSULE, LIQUID FILLED ORAL at 20:49

## 2023-10-29 RX ADMIN — CITALOPRAM HYDROBROMIDE 10 MG: 20 TABLET ORAL at 20:50

## 2023-10-29 NOTE — PLAN OF CARE
Problem: Self Harm/Suicidality  Goal: Will have no self-injury during hospital stay  Description: INTERVENTIONS:  1. Ensure constant observer at bedside with Q15M safety checks  2. Maintain a safe environment  3. Secure patient belongings  4. Ensure family/visitors adhere to safety recommendations  5. Ensure safety tray has been added to patient's diet order  6. Every shift and PRN: Re-assess suicidal risk via Frequent Screener    Outcome: Progressing     Problem: Depression  Goal: Will be euthymic at discharge  Description: INTERVENTIONS:  1. Administer medication as ordered  2. Provide emotional support via 1:1 interaction with staff  3. Encourage involvement in milieu/groups/activities  4. Monitor for social isolation  Outcome: Progressing     Problem: Anxiety  Goal: Will report anxiety at manageable levels  Description: INTERVENTIONS:  1. Administer medication as ordered  2. Teach and rehearse alternative coping skills  3. Provide emotional support with 1:1 interaction with staff  Outcome: Progressing     Patient is isolative to self. Seen out on the unit for snack. Patient appears flat, sad, blunted. Cooperative during assessment. Reports anxiety 7/10 and depression 9/10 due to \"what will happen after discharge\". Patient denies suicidal ideation, homicidal ideations and hallucinations. Medications taken without issue. No complaints or concerns verbalized at this time. Will continue to observe and support.

## 2023-10-29 NOTE — PLAN OF CARE
Denies SI/HI   denies hallucinations  isolative to room most of this a.m. due to increased activity  on the unit   cooperative with meds  attending groups    cooperative with underlying irritability  will continue to monitor

## 2023-10-30 VITALS
HEART RATE: 73 BPM | RESPIRATION RATE: 16 BRPM | SYSTOLIC BLOOD PRESSURE: 107 MMHG | WEIGHT: 157.6 LBS | OXYGEN SATURATION: 100 % | TEMPERATURE: 98.6 F | BODY MASS INDEX: 20.23 KG/M2 | DIASTOLIC BLOOD PRESSURE: 66 MMHG | HEIGHT: 74 IN

## 2023-10-30 PROCEDURE — 6370000000 HC RX 637 (ALT 250 FOR IP): Performed by: NURSE PRACTITIONER

## 2023-10-30 PROCEDURE — 99239 HOSP IP/OBS DSCHRG MGMT >30: CPT | Performed by: NURSE PRACTITIONER

## 2023-10-30 RX ORDER — CITALOPRAM HYDROBROMIDE 10 MG/1
10 TABLET ORAL NIGHTLY
Qty: 30 TABLET | Refills: 0 | Status: SHIPPED | OUTPATIENT
Start: 2023-10-30 | End: 2023-11-29

## 2023-10-30 RX ORDER — LITHIUM CARBONATE 300 MG/1
300 CAPSULE ORAL 2 TIMES DAILY WITH MEALS
Qty: 60 CAPSULE | Refills: 0 | Status: SHIPPED | OUTPATIENT
Start: 2023-10-30 | End: 2023-11-29

## 2023-10-30 RX ORDER — LANOLIN ALCOHOL/MO/W.PET/CERES
3 CREAM (GRAM) TOPICAL NIGHTLY PRN
Refills: 0 | COMMUNITY
Start: 2023-10-30

## 2023-10-30 RX ADMIN — LEVOTHYROXINE SODIUM 50 MCG: 0.05 TABLET ORAL at 06:34

## 2023-10-30 RX ADMIN — LITHIUM CARBONATE 300 MG: 300 CAPSULE, GELATIN COATED ORAL at 08:52

## 2023-10-30 RX ADMIN — FOLIC ACID 1 MG: 1 TABLET ORAL at 08:52

## 2023-10-30 RX ADMIN — DOCUSATE SODIUM 100 MG: 100 CAPSULE, LIQUID FILLED ORAL at 08:52

## 2023-10-30 ASSESSMENT — PAIN SCALES - GENERAL: PAINLEVEL_OUTOF10: 0

## 2023-10-30 NOTE — DISCHARGE SUMMARY
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    Sexual activity: Yes   Other Topics Concern    Not on file   Social History Narrative    Not on file     Social Determinants of Health     Financial Resource Strain: Not on file   Food Insecurity: Not on file   Transportation Needs: Not on file   Physical Activity: Not on file   Stress: Not on file   Social Connections: Not on file   Intimate Partner Violence: Not on file   Housing Stability: Not on file       MEDICATIONS:    Current Facility-Administered Medications:     donepezil (ARICEPT) tablet 10 mg, 10 mg, Oral, Nightly, Chalo Simmer, APRN - CNP, 10 mg at 10/29/23 2050    docusate sodium (COLACE) capsule 100 mg, 100 mg, Oral, BID, Chalo Simmer, APRN - CNP, 100 mg at 10/30/23 2613    citalopram (CELEXA) tablet 10 mg, 10 mg, Oral, Nightly, Chalo Simmer, APRN - CNP, 10 mg at 10/29/23 2050    levothyroxine (SYNTHROID) tablet 50 mcg, 50 mcg, Oral, Daily, Chalo Simmer, APRN - CNP, 50 mcg at 92/64/04 9612    folic acid (FOLVITE) tablet 1 mg, 1 mg, Oral, Daily, Chalo Simmer, APRN - CNP, 1 mg at 10/30/23 1134    lithium capsule 300 mg, 300 mg, Oral, BID WC, Chalo Simmer, APRN - CNP, 300 mg at 10/30/23 4943    acetaminophen (TYLENOL) tablet 650 mg, 650 mg, Oral, Q4H PRN, Mark Delgado MD, 650 mg at 10/28/23 7841    magnesium hydroxide (MILK OF MAGNESIA) 400 MG/5ML suspension 30 mL, 30 mL, Oral, Daily PRN, Mark Delgado MD    nicotine (NICODERM CQ) 21 MG/24HR 1 patch, 1 patch, TransDERmal, Daily, Mark Delgado MD    aluminum & magnesium hydroxide-simethicone (MAALOX) 164-881-97 MG/5ML suspension 30 mL, 30 mL, Oral, PRN, Josh Cuellar MD    hydrOXYzine pamoate (VISTARIL) capsule 25 mg, 25 mg, Oral, TID PRN, Mark Delgado MD, 25 mg at 10/28/23 0926    haloperidol (HALDOL) tablet 3 mg, 3 mg, Oral, Q6H PRN **OR**

## 2023-10-30 NOTE — GROUP NOTE
Group Therapy Note    Date: 10/30/2023    Group Start Time: 8281  Group End Time: 2892  Group Topic: Psychoeducation    [facility]    Irasema Lagunas                                                                        Group Therapy Note    Date: 10/30/2023  Module Name:  social and physical changes. Patient's Goal:  Patient will be able to id what basic steps one can take to improve his/her social/physical wellness. Notes:  Patient pleasant and willing to share when prompted, able to id social support. Accepting of worksheet. Status After Intervention:  Improved    Participation Level:  Active Listener and Interactive    Participation Quality: Appropriate, Attentive, and Sharing      Speech:  normal      Thought Process/Content: Logical      Affective Functioning: Congruent      Mood: euthymic      Level of consciousness:  Alert, Oriented x4, and Attentive      Response to Learning: Able to verbalize/acknowledge new learning and Progressing to goal      Endings: None Reported    Modes of Intervention: Education, Support, Socialization, and Clarifying      Discipline Responsible: Psychoeducational Specialist      Signature:  Irasema Lagunas

## 2023-10-30 NOTE — PLAN OF CARE
Problem: Self Harm/Suicidality  Goal: Will have no self-injury during hospital stay  Description: INTERVENTIONS:  1. Ensure constant observer at bedside with Q15M safety checks  2. Maintain a safe environment  3. Secure patient belongings  4. Ensure family/visitors adhere to safety recommendations  5. Ensure safety tray has been added to patient's diet order  6. Every shift and PRN: Re-assess suicidal risk via Frequent Screener    Outcome: Progressing     Problem: Depression  Goal: Will be euthymic at discharge  Description: INTERVENTIONS:  1. Administer medication as ordered  2. Provide emotional support via 1:1 interaction with staff  3. Encourage involvement in milieu/groups/activities  4. Monitor for social isolation  10/30/2023 1246 by Joceline Laura RN  Outcome: Progressing     Problem: Maribeth  Goal: Will exhibit normal sleep and speech and no impulsivity  Description: INTERVENTIONS:  1. Administer medication as ordered  2. Set limits on impulsive behavior  3. Make attempts to decrease external stimuli as possible  10/30/2023 1246 by Joceline Laura RN  Outcome: Progressing     Problem: Psychosis  Goal: Will report no hallucinations or delusions  Description: INTERVENTIONS:  1. Administer medication as  ordered  2. Assist with reality testing to support increasing orientation  3. Assess if patient's hallucinations or delusions are encouraging self harm or harm to others and intervene as appropriate  10/30/2023 1246 by Joceline Laura RN  Outcome: Progressing     Problem: Behavior  Goal: Pt/Family maintain appropriate behavior and adhere to behavioral management agreement, if implemented  Description: INTERVENTIONS:  1. Assess patient/family's coping skills and  non-compliant behavior (including use of illegal substances)  2. Notify security of behavior or suspected illegal substances which indicate the need for search of the family and/or belongings  3.  Encourage verbalization of thoughts and concerns

## 2023-10-30 NOTE — CARE COORDINATION
JOHNIE met with pt to discuss discharge for today. Pt is alert and oriented x4. Pt's mood is neutral, affect is congruent. Pt's speech is clear, rate and volume is normal. Pt's insight and judgement is improved. Pt denied depression and reported some anxiety rating it a 5/10 but stated this is normal for him. Pt denied SI, HI, AVH. Pt reported that he will be discharging to his home with his wife and his car is here so no one has to pick him up from the hospital. Pt stated that he is looking forward to going to the [place] for follow up services and working on getting his own home, going to the bank and getting good coffee. Pt reported to feeling great today. Pt stated that they are going to get their medications filled at the [facility] and if there is a copay on the medications he will be able to pay it. JOHNIE called pt's wife Rachelle Elizalde [phone] to discuss discharge planning. JOHNIE spoke with Rachelle Elizalde who stated that the pt called her this morning and he sounds good and he stated that he is being discharged from the hospital. Rachelle Elizalde stated that she feels that the pt is never going to get rid of his depression and his hospital visits keep getting closer together. Rachelle Elizalde stated that the pt did not sound depressed on the phone the last 3 days. Rachelle Elizalde stated that the pt's car is currently at the hospital and he can drive himself home. Rachelle Elizalde denied having any questions about discharge today. Pt has follow up appointments scheduled at [facility].  Pt has a medication management appointment on 11/3 at 2 PM and 11/21 at 11 AM.    In order to ensure appropriate transition and discharge planning is in place, the following documents have been transmitted to [facility], as the new outpatient provider:    The d/c diagnosis was transmitted to the next care provider  The reason for hospitalization was transmitted to the next care provider  The d/c medications (dosage and indication) were transmitted to the next care provider   The
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
## 2023-10-30 NOTE — PLAN OF CARE
Problem: Maribeth  Goal: Will exhibit normal sleep and speech and no impulsivity  Description: INTERVENTIONS:  1. Administer medication as ordered  2. Set limits on impulsive behavior  3. Make attempts to decrease external stimuli as possible  Outcome: Progressing     Problem: Psychosis  Goal: Will report no hallucinations or delusions  Description: INTERVENTIONS:  1. Administer medication as  ordered  2. Assist with reality testing to support increasing orientation  3. Assess if patient's hallucinations or delusions are encouraging self harm or harm to others and intervene as appropriate  Outcome: Progressing     Problem: Depression  Goal: Will be euthymic at discharge  Description: INTERVENTIONS:  1. Administer medication as ordered  2. Provide emotional support via 1:1 interaction with staff  3. Encourage involvement in milieu/groups/activities  4. Monitor for social isolation  Outcome: Progressing     Patient in day room area with other patients watching television. Appears flat but brightens on approach, laughing, and making jokes. States, \"I've been good today. Real good. \" Denies any anxiety or depression. Denies any suicidal or homicidal ideations. Denies any auditory or visual hallucinations. Encouraged patient to let staff know should he have any questions or concerns. Verbalized understanding. Will continue to monitor.

## 2024-01-02 ENCOUNTER — HOSPITAL ENCOUNTER (INPATIENT)
Age: 74
LOS: 5 days | Discharge: HOME HEALTH | DRG: 885 | End: 2024-01-08
Attending: EMERGENCY MEDICINE | Admitting: PSYCHIATRY & NEUROLOGY
Payer: MEDICARE

## 2024-01-02 DIAGNOSIS — R45.851 SUICIDAL IDEATION: Primary | ICD-10-CM

## 2024-01-02 LAB
ALBUMIN SERPL-MCNC: 4.6 G/DL (ref 3.5–5.2)
ALP SERPL-CCNC: 85 U/L (ref 40–129)
ALT SERPL-CCNC: 12 U/L (ref 0–40)
AMPHET UR QL SCN: NEGATIVE
ANION GAP SERPL CALCULATED.3IONS-SCNC: 10 MMOL/L (ref 7–16)
APAP SERPL-MCNC: <5 UG/ML (ref 10–30)
AST SERPL-CCNC: 20 U/L (ref 0–39)
BARBITURATES UR QL SCN: NEGATIVE
BASOPHILS # BLD: 0.03 K/UL (ref 0–0.2)
BASOPHILS NFR BLD: 1 % (ref 0–2)
BENZODIAZ UR QL: NEGATIVE
BILIRUB SERPL-MCNC: 0.5 MG/DL (ref 0–1.2)
BILIRUB UR QL STRIP: NEGATIVE
BUN SERPL-MCNC: 14 MG/DL (ref 6–23)
BUPRENORPHINE UR QL: NEGATIVE
CALCIUM SERPL-MCNC: 9.3 MG/DL (ref 8.6–10.2)
CANNABINOIDS UR QL SCN: NEGATIVE
CHLORIDE SERPL-SCNC: 104 MMOL/L (ref 98–107)
CLARITY UR: CLEAR
CO2 SERPL-SCNC: 28 MMOL/L (ref 22–29)
COCAINE UR QL SCN: NEGATIVE
COLOR UR: YELLOW
COMMENT: NORMAL
CREAT SERPL-MCNC: 1.2 MG/DL (ref 0.7–1.2)
EOSINOPHIL # BLD: 0.19 K/UL (ref 0.05–0.5)
EOSINOPHILS RELATIVE PERCENT: 3 % (ref 0–6)
ERYTHROCYTE [DISTWIDTH] IN BLOOD BY AUTOMATED COUNT: 13 % (ref 11.5–15)
ETHANOLAMINE SERPL-MCNC: <10 MG/DL
FENTANYL UR QL: NEGATIVE
GFR SERPL CREATININE-BSD FRML MDRD: >60 ML/MIN/1.73M2
GLUCOSE SERPL-MCNC: 78 MG/DL (ref 74–99)
GLUCOSE UR STRIP-MCNC: NEGATIVE MG/DL
HCT VFR BLD AUTO: 44.4 % (ref 37–54)
HGB BLD-MCNC: 14.5 G/DL (ref 12.5–16.5)
HGB UR QL STRIP.AUTO: NEGATIVE
IMM GRANULOCYTES # BLD AUTO: <0.03 K/UL (ref 0–0.58)
IMM GRANULOCYTES NFR BLD: 0 % (ref 0–5)
KETONES UR STRIP-MCNC: NEGATIVE MG/DL
LEUKOCYTE ESTERASE UR QL STRIP: NEGATIVE
LITHIUM DATE LAST DOSE: ABNORMAL
LITHIUM DOSE AMOUNT: ABNORMAL
LITHIUM DOSE TIME: ABNORMAL
LITHIUM LEVEL: 0.2 MMOL/L (ref 0.5–1.5)
LYMPHOCYTES NFR BLD: 1.33 K/UL (ref 1.5–4)
LYMPHOCYTES RELATIVE PERCENT: 23 % (ref 20–42)
MCH RBC QN AUTO: 31.6 PG (ref 26–35)
MCHC RBC AUTO-ENTMCNC: 32.7 G/DL (ref 32–34.5)
MCV RBC AUTO: 96.7 FL (ref 80–99.9)
METHADONE UR QL: NEGATIVE
MONOCYTES NFR BLD: 0.6 K/UL (ref 0.1–0.95)
MONOCYTES NFR BLD: 11 % (ref 2–12)
NEUTROPHILS NFR BLD: 62 % (ref 43–80)
NEUTS SEG NFR BLD: 3.58 K/UL (ref 1.8–7.3)
NITRITE UR QL STRIP: NEGATIVE
OPIATES UR QL SCN: NEGATIVE
OXYCODONE UR QL SCN: NEGATIVE
PCP UR QL SCN: NEGATIVE
PH UR STRIP: 5.5 [PH] (ref 5–9)
PLATELET # BLD AUTO: 221 K/UL (ref 130–450)
PMV BLD AUTO: 9.5 FL (ref 7–12)
POTASSIUM SERPL-SCNC: 4.1 MMOL/L (ref 3.5–5)
PROT SERPL-MCNC: 7 G/DL (ref 6.4–8.3)
PROT UR STRIP-MCNC: NEGATIVE MG/DL
RBC # BLD AUTO: 4.59 M/UL (ref 3.8–5.8)
SALICYLATES SERPL-MCNC: <0.3 MG/DL (ref 0–30)
SODIUM SERPL-SCNC: 142 MMOL/L (ref 132–146)
SP GR UR STRIP: 1.02 (ref 1–1.03)
TEST INFORMATION: NORMAL
TOXIC TRICYCLIC SC,BLOOD: NEGATIVE
UROBILINOGEN UR STRIP-ACNC: 0.2 EU/DL (ref 0–1)
WBC OTHER # BLD: 5.7 K/UL (ref 4.5–11.5)

## 2024-01-02 PROCEDURE — 99285 EMERGENCY DEPT VISIT HI MDM: CPT

## 2024-01-02 PROCEDURE — 93005 ELECTROCARDIOGRAM TRACING: CPT | Performed by: EMERGENCY MEDICINE

## 2024-01-02 PROCEDURE — 81003 URINALYSIS AUTO W/O SCOPE: CPT

## 2024-01-02 PROCEDURE — 80053 COMPREHEN METABOLIC PANEL: CPT

## 2024-01-02 PROCEDURE — 80143 DRUG ASSAY ACETAMINOPHEN: CPT

## 2024-01-02 PROCEDURE — 80307 DRUG TEST PRSMV CHEM ANLYZR: CPT

## 2024-01-02 PROCEDURE — G0480 DRUG TEST DEF 1-7 CLASSES: HCPCS

## 2024-01-02 PROCEDURE — 80179 DRUG ASSAY SALICYLATE: CPT

## 2024-01-02 PROCEDURE — 85025 COMPLETE CBC W/AUTO DIFF WBC: CPT

## 2024-01-02 PROCEDURE — 80178 ASSAY OF LITHIUM: CPT

## 2024-01-02 NOTE — ED TRIAGE NOTES
Recent suicide attempt now with increased thoughts of suicide without a plan above his base line.  Patient is [facility] .

## 2024-01-03 PROBLEM — F33.1 MDD (MAJOR DEPRESSIVE DISORDER), RECURRENT EPISODE, MODERATE (HCC): Status: ACTIVE | Noted: 2024-01-03

## 2024-01-03 LAB
EKG ATRIAL RATE: 65 BPM
EKG P AXIS: 77 DEGREES
EKG P-R INTERVAL: 154 MS
EKG Q-T INTERVAL: 430 MS
EKG QRS DURATION: 72 MS
EKG QTC CALCULATION (BAZETT): 447 MS
EKG R AXIS: 65 DEGREES
EKG T AXIS: 55 DEGREES
EKG VENTRICULAR RATE: 65 BPM

## 2024-01-03 PROCEDURE — 93010 ELECTROCARDIOGRAM REPORT: CPT | Performed by: INTERNAL MEDICINE

## 2024-01-03 PROCEDURE — 1240000000 HC EMOTIONAL WELLNESS R&B

## 2024-01-03 PROCEDURE — 6370000000 HC RX 637 (ALT 250 FOR IP): Performed by: NURSE PRACTITIONER

## 2024-01-03 PROCEDURE — 90792 PSYCH DIAG EVAL W/MED SRVCS: CPT | Performed by: NURSE PRACTITIONER

## 2024-01-03 PROCEDURE — 6370000000 HC RX 637 (ALT 250 FOR IP): Performed by: PSYCHIATRY & NEUROLOGY

## 2024-01-03 RX ORDER — NICOTINE 21 MG/24HR
1 PATCH, TRANSDERMAL 24 HOURS TRANSDERMAL DAILY
Status: DISCONTINUED | OUTPATIENT
Start: 2024-01-03 | End: 2024-01-08 | Stop reason: HOSPADM

## 2024-01-03 RX ORDER — FOLIC ACID 1 MG/1
1 TABLET ORAL DAILY
Status: DISCONTINUED | OUTPATIENT
Start: 2024-01-03 | End: 2024-01-08 | Stop reason: HOSPADM

## 2024-01-03 RX ORDER — CITALOPRAM HYDROBROMIDE 10 MG/1
10 TABLET ORAL DAILY
Status: DISCONTINUED | OUTPATIENT
Start: 2024-01-03 | End: 2024-01-08 | Stop reason: HOSPADM

## 2024-01-03 RX ORDER — LEVOTHYROXINE SODIUM 0.05 MG/1
50 TABLET ORAL DAILY
Status: DISCONTINUED | OUTPATIENT
Start: 2024-01-03 | End: 2024-01-08 | Stop reason: HOSPADM

## 2024-01-03 RX ORDER — ACETAMINOPHEN 325 MG/1
650 TABLET ORAL EVERY 4 HOURS PRN
Status: DISCONTINUED | OUTPATIENT
Start: 2024-01-03 | End: 2024-01-08 | Stop reason: HOSPADM

## 2024-01-03 RX ORDER — LITHIUM CARBONATE 300 MG/1
300 CAPSULE ORAL 2 TIMES DAILY WITH MEALS
Status: DISCONTINUED | OUTPATIENT
Start: 2024-01-03 | End: 2024-01-08 | Stop reason: HOSPADM

## 2024-01-03 RX ORDER — HALOPERIDOL 2 MG/1
3 TABLET ORAL EVERY 6 HOURS PRN
Status: DISCONTINUED | OUTPATIENT
Start: 2024-01-03 | End: 2024-01-08 | Stop reason: HOSPADM

## 2024-01-03 RX ORDER — DOCUSATE SODIUM 100 MG/1
100 CAPSULE, LIQUID FILLED ORAL 2 TIMES DAILY
Status: DISCONTINUED | OUTPATIENT
Start: 2024-01-03 | End: 2024-01-08 | Stop reason: HOSPADM

## 2024-01-03 RX ORDER — HALOPERIDOL 5 MG/ML
3 INJECTION INTRAMUSCULAR EVERY 6 HOURS PRN
Status: DISCONTINUED | OUTPATIENT
Start: 2024-01-03 | End: 2024-01-08 | Stop reason: HOSPADM

## 2024-01-03 RX ORDER — DONEPEZIL HYDROCHLORIDE 5 MG/1
10 TABLET, FILM COATED ORAL NIGHTLY
Status: DISCONTINUED | OUTPATIENT
Start: 2024-01-03 | End: 2024-01-08 | Stop reason: HOSPADM

## 2024-01-03 RX ORDER — MAGNESIUM HYDROXIDE/ALUMINUM HYDROXICE/SIMETHICONE 120; 1200; 1200 MG/30ML; MG/30ML; MG/30ML
30 SUSPENSION ORAL PRN
Status: DISCONTINUED | OUTPATIENT
Start: 2024-01-03 | End: 2024-01-08 | Stop reason: HOSPADM

## 2024-01-03 RX ORDER — HYDROXYZINE PAMOATE 25 MG/1
25 CAPSULE ORAL 3 TIMES DAILY PRN
Status: DISCONTINUED | OUTPATIENT
Start: 2024-01-03 | End: 2024-01-08 | Stop reason: HOSPADM

## 2024-01-03 RX ORDER — LANOLIN ALCOHOL/MO/W.PET/CERES
3 CREAM (GRAM) TOPICAL NIGHTLY PRN
Status: DISCONTINUED | OUTPATIENT
Start: 2024-01-03 | End: 2024-01-08 | Stop reason: HOSPADM

## 2024-01-03 RX ADMIN — MELATONIN 3 MG ORAL TABLET 3 MG: 3 TABLET ORAL at 21:25

## 2024-01-03 RX ADMIN — CITALOPRAM HYDROBROMIDE 10 MG: 20 TABLET ORAL at 13:44

## 2024-01-03 RX ADMIN — LITHIUM CARBONATE 300 MG: 300 CAPSULE, GELATIN COATED ORAL at 16:52

## 2024-01-03 RX ADMIN — FOLIC ACID 1 MG: 1 TABLET ORAL at 11:33

## 2024-01-03 RX ADMIN — DONEPEZIL HYDROCHLORIDE 10 MG: 5 TABLET, FILM COATED ORAL at 21:25

## 2024-01-03 RX ADMIN — HYDROXYZINE PAMOATE 25 MG: 25 CAPSULE ORAL at 21:26

## 2024-01-03 RX ADMIN — DOCUSATE SODIUM 100 MG: 100 CAPSULE, LIQUID FILLED ORAL at 21:25

## 2024-01-03 RX ADMIN — DOCUSATE SODIUM 100 MG: 100 CAPSULE, LIQUID FILLED ORAL at 11:33

## 2024-01-03 RX ADMIN — LEVOTHYROXINE SODIUM 50 MCG: 0.05 TABLET ORAL at 11:33

## 2024-01-03 ASSESSMENT — PATIENT HEALTH QUESTIONNAIRE - PHQ9
SUM OF ALL RESPONSES TO PHQ QUESTIONS 1-9: 14
SUM OF ALL RESPONSES TO PHQ QUESTIONS 1-9: 14
4. FEELING TIRED OR HAVING LITTLE ENERGY: 2
8. MOVING OR SPEAKING SO SLOWLY THAT OTHER PEOPLE COULD HAVE NOTICED. OR THE OPPOSITE, BEING SO FIGETY OR RESTLESS THAT YOU HAVE BEEN MOVING AROUND A LOT MORE THAN USUAL: 2
SUM OF ALL RESPONSES TO PHQ QUESTIONS 1-9: 12
3. TROUBLE FALLING OR STAYING ASLEEP: 1
6. FEELING BAD ABOUT YOURSELF - OR THAT YOU ARE A FAILURE OR HAVE LET YOURSELF OR YOUR FAMILY DOWN: 1
5. POOR APPETITE OR OVEREATING: 0
7. TROUBLE CONCENTRATING ON THINGS, SUCH AS READING THE NEWSPAPER OR WATCHING TELEVISION: 2
SUM OF ALL RESPONSES TO PHQ QUESTIONS 1-9: 14
10. IF YOU CHECKED OFF ANY PROBLEMS, HOW DIFFICULT HAVE THESE PROBLEMS MADE IT FOR YOU TO DO YOUR WORK, TAKE CARE OF THINGS AT HOME, OR GET ALONG WITH OTHER PEOPLE: 2
1. LITTLE INTEREST OR PLEASURE IN DOING THINGS: 2
2. FEELING DOWN, DEPRESSED OR HOPELESS: 2
SUM OF ALL RESPONSES TO PHQ9 QUESTIONS 1 & 2: 4
9. THOUGHTS THAT YOU WOULD BE BETTER OFF DEAD, OR OF HURTING YOURSELF: 2

## 2024-01-03 ASSESSMENT — SLEEP AND FATIGUE QUESTIONNAIRES
AVERAGE NUMBER OF SLEEP HOURS: 8
DO YOU HAVE DIFFICULTY SLEEPING: NO
AVERAGE NUMBER OF SLEEP HOURS: 8.5
DO YOU USE A SLEEP AID: NO
DO YOU HAVE DIFFICULTY SLEEPING: NO
DO YOU USE A SLEEP AID: NO

## 2024-01-03 NOTE — GROUP NOTE
Group Therapy Note    Date: 1/3/2024    Group Start Time: 1045  Group End Time: 1120  Group Topic: Psychoeducation    [facility]    Chanda Morris CTRS                                                                        Group Therapy Note    Date: 1/3/2024  Start Time: 1045  End Time:  1120  Number of Participants: 5    Type of Group: Psychoeducation    Wellness Binder Information  Module Name:  5 Ways to Well being    Patient's Goal:  Patient will be able to identify 5 dimensions of wellness.  Patient will be able to identify one or two ways to improve well being.     Notes:  CTRS discussed five ways to well being including five dimensions of wellness.  CTRS provided examples, and patient was an active participant in discussion.  Patient was accepting of handout and was able to ID one or two ways to improve their wellness.       Status After Intervention:  Improved    Participation Level: Active Listener and Interactive    Participation Quality: Appropriate and Attentive      Speech:  normal      Thought Process/Content: Logical      Affective Functioning: Congruent      Mood: euthymic      Level of consciousness:  Alert and Attentive      Response to Learning: Able to verbalize current knowledge/experience, Able to verbalize/acknowledge new learning, and Progressing to goal      Endings: None Reported    Modes of Intervention: Education, Support, Exploration, and Problem-solving      Discipline Responsible: Psychoeducational Specialist      Signature:  PAULY Lion

## 2024-01-03 NOTE — GROUP NOTE
Group Therapy Note    Date: 1/3/2024    Group Start Time: 0945  Group End Time: 1020  Group Topic: Cognitive Skills    [facility]    Adi García, YOU, RONNI        Group Therapy Note    Attendees: 6       Patient's Goal:  Pt attended group therapy where we discussed conflict resolution with \"Fair Fighting Rules.\"    Notes:  Pt was an active participant in group therapy.    Status After Intervention:  Improved    Participation Level: Active Listener and Interactive    Participation Quality: Appropriate, Attentive, and Sharing      Speech:  normal      Thought Process/Content: Logical      Affective Functioning: Flat      Mood: depressed      Level of consciousness:  Alert and Attentive      Response to Learning: Able to verbalize current knowledge/experience, Able to verbalize/acknowledge new learning, and Able to retain information      Endings: None Reported    Modes of Intervention: Education, Support, Socialization, Exploration, Clarifying, and Problem-solving      Discipline Responsible: /Counselor      Signature:  YOU Trinidad, RONNI

## 2024-01-03 NOTE — H&P
Department of Psychiatry  History and Physical - Adult     CHIEF COMPLAINT:  \"I have to live with my ex-wife\"    Patient was seen after discussing with the treatment team and reviewing the chart    CIRCUMSTANCES OF ADMISSION:   Cuco Romero is a 73 y.o. male with history of mood disorder, with multiple past psychiatric hospitalizations, well known to these providers, again presented to the ER after an appointment with his counselor, reporting suicidal ideations, he has a history of suicide attempts, and was pink slipped for suicidal ideations..       HISTORY OF PRESENT ILLNESS:      The patient is a 73 y.o. male with significant past history of  prostate cancer, major depressive disorder with mixed features, borderline personality disorder, [facility]ation reporting that he had overdosed on his lithium a week ago. He reported feeling overwhelmed and depressed, mostly because he has to live with his ex-wife with no other housing options. His lithium level in ER is 0.2 subtherapeutic.   In the ED his urine drug screen was negative his blood alcohol level is negative he was medically cleared admitted to [facility]  psychiatric unit for further psychiatric assessment stabilization and treatment     On evaluation today Cuco states that he is depressed because he has no other housing options, and is forced to reside with his ex-wife. This causes him stress. He states that he attempted to overdose on his lithium and fully expected to not wake up but woke up just fine. He is not clear how long ago he attempted this overdose, but some reports indicated it was a week ago. Currently his lithium level is subtherapeutic at 0.2. He never required any medical intervention which is telling given the high risk of lethality with a lithium overdose, it is unlikely he overdosed or that he took a sufficient overdose of the medication. He will remain at risk of these behaviors due to his

## 2024-01-03 NOTE — CARE COORDINATION
Biopsychosocial Assessment Note    Social work met with patient to complete the biopsychosocial assessment and C-SSRS.     Chief Complaint: \"A week before Christmas I took an overdose of Librium or Lithium.\"       Mental Status Exam: Pt was alert and oriented x4, depressed with an appropriate affect.  Pt was friendly and cooperative with a blocking thought process.  Pt denies AVH.  Pt reports SI.  Pt denies HI.      Clinical Summary: Pt is a 73 year old male that presented to the ED after he was speaking with [facility] and disclosed a recent suicide attempt.  Pt stated that in between Christmas and New Years he took an overdose of Librium or Lithium. He stated that his wife is responsible for putting out his medication and she left the bottle out and that was a trigger to overdose.      Pt has had multiple inpatient psychiatric admissions to this facility since 2020.  He reports having three suicide attempts in the past five years.    Pt reports he was raised by both parents and was the youngest of 6 siblings.  Pt stated that his sister also has depression.  He denies any past trauma.  Pt was in the [employer] for two years but denies ever being in combat.  Pt stated that during his time in the service was when he was the happiest. Pt is retired from the [employer].  Pt did report a history of alcoholism and is active with AA.  He stated that he has over 40 years of sobriety.     Pt has been legally  from his wife of over 20 years for the past year.  He was  one time prior and never had any children.  Pt reports that residing with his () wife is his main stressor. He reports having a good appetite and sleeping well.  He reports feeling suicidal frequently.  He denies HI and denies AVH.  Pt denies any self harm behaviors.  He stated that he would like to go to [facility] and [facility]. He is active with AA and has a large support system located

## 2024-01-03 NOTE — ED NOTES
Nurse to nurse report given to Lala ORANTES on [facility] which includes patient presentation complaint, pink slip, medications, lab results EKG Qtc, and past medical/psych history and admitting orders.

## 2024-01-04 PROCEDURE — 6370000000 HC RX 637 (ALT 250 FOR IP): Performed by: PSYCHIATRY & NEUROLOGY

## 2024-01-04 PROCEDURE — 1240000000 HC EMOTIONAL WELLNESS R&B

## 2024-01-04 PROCEDURE — 99232 SBSQ HOSP IP/OBS MODERATE 35: CPT | Performed by: NURSE PRACTITIONER

## 2024-01-04 PROCEDURE — 6370000000 HC RX 637 (ALT 250 FOR IP): Performed by: NURSE PRACTITIONER

## 2024-01-04 RX ADMIN — DOCUSATE SODIUM 100 MG: 100 CAPSULE, LIQUID FILLED ORAL at 09:01

## 2024-01-04 RX ADMIN — LITHIUM CARBONATE 300 MG: 300 CAPSULE, GELATIN COATED ORAL at 09:01

## 2024-01-04 RX ADMIN — LITHIUM CARBONATE 300 MG: 300 CAPSULE, GELATIN COATED ORAL at 16:50

## 2024-01-04 RX ADMIN — DONEPEZIL HYDROCHLORIDE 10 MG: 5 TABLET, FILM COATED ORAL at 21:21

## 2024-01-04 RX ADMIN — MELATONIN 3 MG ORAL TABLET 3 MG: 3 TABLET ORAL at 21:21

## 2024-01-04 RX ADMIN — LEVOTHYROXINE SODIUM 50 MCG: 0.05 TABLET ORAL at 06:02

## 2024-01-04 RX ADMIN — CITALOPRAM HYDROBROMIDE 10 MG: 20 TABLET ORAL at 09:01

## 2024-01-04 RX ADMIN — DOCUSATE SODIUM 100 MG: 100 CAPSULE, LIQUID FILLED ORAL at 21:21

## 2024-01-04 RX ADMIN — FOLIC ACID 1 MG: 1 TABLET ORAL at 09:01

## 2024-01-04 RX ADMIN — HYDROXYZINE PAMOATE 25 MG: 25 CAPSULE ORAL at 21:21

## 2024-01-04 NOTE — DISCHARGE INSTRUCTIONS
Attending Provider: Kary Cuellar MD.     If you have any questions and need to contact this individual please call the unit at [phone].  A Behavioral Health Provider will be available on call 24/7 and during holidays.        Reason for Admission: Cuco Romero is a 73 y.o. male with history of mood disorder, with multiple past psychiatric hospitalizations, well known to these providers, again presented to the ER after an appointment with his counselor, reporting suicidal ideations, he has a history of suicide attempts, and was pink slipped for suicidal ideations..

## 2024-01-04 NOTE — BH NOTE
Patient is resting quietly in bed with eyes closed at this time.  No signs of distress or discomfort noted.  No PRN medications given thus far.  Safety needs met.  No unit problems reported.  Purposeful rounding continued.

## 2024-01-04 NOTE — GROUP NOTE
Group Therapy Note    Date: 1/4/2024    Group Start Time: 1410  Group End Time: 1450  Group Topic: Cognitive Skills    [facility]    Adi García, RONNI ORTA        Group Therapy Note    Attendees: 7         Patient's Goal:  Pt attended group therapy where we discussed the myths of grief.    Notes:  Pt was an active participant in group therapy.    Status After Intervention:  Improved    Participation Level: Active Listener and Interactive    Participation Quality: Appropriate, Attentive, and Sharing      Speech:  normal      Thought Process/Content: Logical      Affective Functioning: Congruent      Mood: euthymic      Level of consciousness:  Alert, Oriented x4, and Attentive      Response to Learning: Able to verbalize current knowledge/experience, Able to verbalize/acknowledge new learning, and Able to retain information      Endings: None Reported    Modes of Intervention: Education, Support, Socialization, Exploration, Clarifying, and Problem-solving      Discipline Responsible: /Counselor      Signature:  YOU Trinidad, RONNI     0 (no pain/absence of nonverbal indicators of pain)

## 2024-01-04 NOTE — GROUP NOTE
Group Therapy Note    Date: 1/4/2024  Start Time: 1530  End Time:  1615  Number of Participants: 6    Type of Group: Recreational    Wellness Binder Information  Module Name:  Guess it Game    Patient's Goal:  To increase socialization amongst peers.  To enhance/maintain cognitive functioning.     Notes:  CTRS facilitated a game called the [employer] it Game.  Patient was an active participant in recreational activity.     Status After Intervention:  Improved    Participation Level: Active Listener and Interactive    Participation Quality: Appropriate and Attentive      Speech:  normal      Thought Process/Content: Logical      Affective Functioning: Congruent      Mood: euthymic      Level of consciousness:  Alert and Attentive      Response to Learning: Able to verbalize current knowledge/experience, Able to verbalize/acknowledge new learning, and Progressing to goal      Endings: None Reported    Modes of Intervention: Education, Support, Socialization, and Activity      Discipline Responsible: Psychoeducational Specialist      Signature:  PAULY Lion    Group Therapy Note    Attendees: 6

## 2024-01-04 NOTE — BH NOTE
Patient denies suicidal ideation, homicidal ideations and AVH. Reports anxiety a 7/10 and depression 8/10.  Presents calm and cooperative during assessment, lying in bed with eyes open. Medications taken without issue.  No complaints or concerns verbalized at this time.  No unit problems reported.  Will continue to observe and support.

## 2024-01-05 LAB
CHOLEST SERPL-MCNC: 126 MG/DL
HBA1C MFR BLD: 5.3 % (ref 4–5.6)
HDLC SERPL-MCNC: 42 MG/DL
LDLC SERPL CALC-MCNC: 58 MG/DL
TRIGL SERPL-MCNC: 130 MG/DL
VLDLC SERPL CALC-MCNC: 26 MG/DL

## 2024-01-05 PROCEDURE — 83036 HEMOGLOBIN GLYCOSYLATED A1C: CPT

## 2024-01-05 PROCEDURE — 1240000000 HC EMOTIONAL WELLNESS R&B

## 2024-01-05 PROCEDURE — 99232 SBSQ HOSP IP/OBS MODERATE 35: CPT | Performed by: NURSE PRACTITIONER

## 2024-01-05 PROCEDURE — 36415 COLL VENOUS BLD VENIPUNCTURE: CPT

## 2024-01-05 PROCEDURE — 6370000000 HC RX 637 (ALT 250 FOR IP): Performed by: NURSE PRACTITIONER

## 2024-01-05 PROCEDURE — 80061 LIPID PANEL: CPT

## 2024-01-05 RX ADMIN — CITALOPRAM HYDROBROMIDE 10 MG: 20 TABLET ORAL at 09:59

## 2024-01-05 RX ADMIN — LEVOTHYROXINE SODIUM 50 MCG: 0.05 TABLET ORAL at 06:21

## 2024-01-05 RX ADMIN — DOCUSATE SODIUM 100 MG: 100 CAPSULE, LIQUID FILLED ORAL at 20:57

## 2024-01-05 RX ADMIN — DOCUSATE SODIUM 100 MG: 100 CAPSULE, LIQUID FILLED ORAL at 09:59

## 2024-01-05 RX ADMIN — LITHIUM CARBONATE 300 MG: 300 CAPSULE, GELATIN COATED ORAL at 17:49

## 2024-01-05 RX ADMIN — FOLIC ACID 1 MG: 1 TABLET ORAL at 10:00

## 2024-01-05 RX ADMIN — LITHIUM CARBONATE 300 MG: 300 CAPSULE, GELATIN COATED ORAL at 10:00

## 2024-01-05 RX ADMIN — DONEPEZIL HYDROCHLORIDE 10 MG: 5 TABLET, FILM COATED ORAL at 20:57

## 2024-01-05 NOTE — GROUP NOTE
Group Therapy Note    Date: 1/5/2024    Group Start Time: 1015  Group End Time: 1045  Group Topic: Cognitive Skills    [facility]    Adi García, RONNI OTRA        Group Therapy Note    Attendees: 9       Patient's Goal:  Pt attended group therapy where we discussed how our thoughts influence out moods and behaviors.     Notes:  Pt was an active listener in group therapy.     Status After Intervention:  Unchanged    Participation Level: Active Listener    Participation Quality: Appropriate and Attentive      Speech:  normal      Thought Process/Content: Logical      Affective Functioning: Congruent      Mood: euthymic      Level of consciousness:  Alert, Oriented x4, and Attentive      Response to Learning: Able to verbalize current knowledge/experience, Able to verbalize/acknowledge new learning, and Able to retain information      Endings: None Reported    Modes of Intervention: Education, Support, Socialization, Exploration, Clarifying, and Problem-solving      Discipline Responsible: /Counselor      Signature:  YOU Trinidad, RONNI

## 2024-01-05 NOTE — CARE COORDINATION
Pt signed an ALEXIS for us to speak with the [facility] representative, Barbara Reardon [phone], regarding [facility] housing.    Also, [facility] the [facility] was sent back to Barbara via e-mail [email or web address].

## 2024-01-05 NOTE — GROUP NOTE
Group Therapy Note    Date: 1/5/2024  Start Time: 1050  End Time:  1135  Number of Participants: 10    Type of Group: Psychoeducation    Wellness Binder Information  Module Name:  Music and Mood    Patient's Goal:  To enhance coping mechanisms with the use of music and ID ways to improve mood with the use of music.  To encourage exploration of feelings.    Notes:  CTRS discussed the benefits of music as it relates to mental health.  CTRS also discussed how music can affect one's mood.  CTRS played songs chosen by patients and had patient describe the attachments to that song.  Patient was an active participant in discussion and was able to ID ways to incorporate music into routine.     Status After Intervention:  Improved    Participation Level: Active Listener    Participation Quality: Appropriate and Attentive      Speech:  normal      Thought Process/Content: Logical      Affective Functioning: Congruent      Mood: anxious      Level of consciousness:  Alert and Attentive      Response to Learning: Able to verbalize current knowledge/experience, Able to verbalize/acknowledge new learning, and Progressing to goal      Endings: None Reported    Modes of Intervention: Education, Exploration, Activity, and Media      Discipline Responsible: Psychoeducational Specialist      Signature:  PAULY Lion

## 2024-01-06 PROCEDURE — 6370000000 HC RX 637 (ALT 250 FOR IP): Performed by: NURSE PRACTITIONER

## 2024-01-06 PROCEDURE — 99232 SBSQ HOSP IP/OBS MODERATE 35: CPT | Performed by: NURSE PRACTITIONER

## 2024-01-06 PROCEDURE — 1240000000 HC EMOTIONAL WELLNESS R&B

## 2024-01-06 RX ADMIN — DOCUSATE SODIUM 100 MG: 100 CAPSULE, LIQUID FILLED ORAL at 21:39

## 2024-01-06 RX ADMIN — LEVOTHYROXINE SODIUM 50 MCG: 0.05 TABLET ORAL at 06:25

## 2024-01-06 RX ADMIN — LITHIUM CARBONATE 300 MG: 300 CAPSULE, GELATIN COATED ORAL at 09:08

## 2024-01-06 RX ADMIN — LITHIUM CARBONATE 300 MG: 300 CAPSULE, GELATIN COATED ORAL at 16:57

## 2024-01-06 RX ADMIN — DOCUSATE SODIUM 100 MG: 100 CAPSULE, LIQUID FILLED ORAL at 09:08

## 2024-01-06 RX ADMIN — DONEPEZIL HYDROCHLORIDE 10 MG: 5 TABLET, FILM COATED ORAL at 21:39

## 2024-01-06 RX ADMIN — FOLIC ACID 1 MG: 1 TABLET ORAL at 09:08

## 2024-01-06 RX ADMIN — CITALOPRAM HYDROBROMIDE 10 MG: 20 TABLET ORAL at 09:08

## 2024-01-06 NOTE — BH NOTE
Patient resting in room with eyes closed. No signs or symptoms of distress noted or observed. Will continue to monitor, provide needs, and ensure safe environment. Q15 minute checks continued.

## 2024-01-06 NOTE — GROUP NOTE
Group Therapy Note    Date: 1/6/2024  Start Time: 1615  End Time:  1640  Number of Participants: 6    Type of Group: Recreational    Wellness Binder Information  Module Name:  Reminisce      Patient's Goal:  To increase socialization amongst peers.  To enhance leisure interests.     Notes:  CTRS discussed and reminisced with patients on the topic of football.  Patient was an active participant in discussion, and socialized well with peers.     Status After Intervention:  Improved    Participation Level: Active Listener and Interactive    Participation Quality: Appropriate and Attentive      Speech:  normal      Thought Process/Content: Logical      Affective Functioning: Congruent      Mood: euthymic      Level of consciousness:  Alert and Attentive      Response to Learning: Able to verbalize current knowledge/experience, Able to verbalize/acknowledge new learning, and Progressing to goal      Endings: None Reported    Modes of Intervention: Socialization and Activity      Discipline Responsible: Psychoeducational Specialist      Signature:  PAULY Lion    Group Therapy Note    Attendees: 6

## 2024-01-07 PROCEDURE — 1240000000 HC EMOTIONAL WELLNESS R&B

## 2024-01-07 PROCEDURE — 6370000000 HC RX 637 (ALT 250 FOR IP): Performed by: NURSE PRACTITIONER

## 2024-01-07 PROCEDURE — 99232 SBSQ HOSP IP/OBS MODERATE 35: CPT | Performed by: NURSE PRACTITIONER

## 2024-01-07 RX ADMIN — DONEPEZIL HYDROCHLORIDE 10 MG: 5 TABLET, FILM COATED ORAL at 21:37

## 2024-01-07 RX ADMIN — DOCUSATE SODIUM 100 MG: 100 CAPSULE, LIQUID FILLED ORAL at 09:15

## 2024-01-07 RX ADMIN — LITHIUM CARBONATE 300 MG: 300 CAPSULE, GELATIN COATED ORAL at 17:17

## 2024-01-07 RX ADMIN — CITALOPRAM HYDROBROMIDE 10 MG: 20 TABLET ORAL at 09:25

## 2024-01-07 RX ADMIN — LEVOTHYROXINE SODIUM 50 MCG: 0.05 TABLET ORAL at 06:11

## 2024-01-07 RX ADMIN — DOCUSATE SODIUM 100 MG: 100 CAPSULE, LIQUID FILLED ORAL at 21:38

## 2024-01-07 RX ADMIN — LITHIUM CARBONATE 300 MG: 300 CAPSULE, GELATIN COATED ORAL at 09:15

## 2024-01-07 RX ADMIN — FOLIC ACID 1 MG: 1 TABLET ORAL at 09:15

## 2024-01-07 NOTE — GROUP NOTE
Group Therapy Note    Date: 1/7/2024  Start Time: 0750  End Time:  0810  Number of Participants: 14    Type of Group: Community Meeting    Patient attended community meeting   Was updated on expectations of the unit, staffing, and programming  Patient shared goal for today as \"not to have anymore temper tantrums\"      Status After Intervention:  Improved    Participation Level: Active Listener and Interactive    Participation Quality: Appropriate and Attentive      Speech:  normal      Thought Process/Content: Logical      Affective Functioning: Congruent      Mood: euthymic      Level of consciousness:  Alert and Attentive      Response to Learning: Able to verbalize current knowledge/experience, Able to verbalize/acknowledge new learning, and Progressing to goal      Endings: None Reported    Modes of Intervention: Education, Exploration, and Problem-solving      Discipline Responsible: Psychoeducational Specialist      Signature:  PAULY Lion

## 2024-01-07 NOTE — GROUP NOTE
Group Therapy Note    Date: 1/7/2024    Group Start Time: 1455  Group End Time: 1518  Group Topic: Cognitive Skills    [facility]    Caitlyn Pang MSW, RONNI        Group Therapy Note    Attendees: 11       Patient's Goal:  Pt will verbalize understanding of how to approach a [place] in a conversation.    Notes:  Pt made connections and participated in group.    Status After Intervention:  Unchanged    Participation Level: Minimal    Participation Quality: Resistant      Speech:  mute      Thought Process/Content:  Linear      Affective Functioning: Flat      Mood: euthymic      Level of consciousness:  Alert and Attentive      Response to Learning: Able to verbalize current knowledge/experience      Endings: None Reported    Modes of Intervention: Education      Discipline Responsible: /Counselor      Signature:  YOU Díaz, RONNI

## 2024-01-08 VITALS
DIASTOLIC BLOOD PRESSURE: 71 MMHG | OXYGEN SATURATION: 98 % | HEIGHT: 75 IN | HEART RATE: 72 BPM | WEIGHT: 163 LBS | RESPIRATION RATE: 18 BRPM | BODY MASS INDEX: 20.27 KG/M2 | SYSTOLIC BLOOD PRESSURE: 109 MMHG | TEMPERATURE: 98.8 F

## 2024-01-08 LAB
LITHIUM DATE LAST DOSE: NORMAL
LITHIUM DOSE AMOUNT: NORMAL
LITHIUM DOSE TIME: NORMAL
LITHIUM LEVEL: 0.6 MMOL/L (ref 0.5–1.5)

## 2024-01-08 PROCEDURE — 6370000000 HC RX 637 (ALT 250 FOR IP): Performed by: NURSE PRACTITIONER

## 2024-01-08 PROCEDURE — 99239 HOSP IP/OBS DSCHRG MGMT >30: CPT | Performed by: NURSE PRACTITIONER

## 2024-01-08 PROCEDURE — 80178 ASSAY OF LITHIUM: CPT

## 2024-01-08 PROCEDURE — 36415 COLL VENOUS BLD VENIPUNCTURE: CPT

## 2024-01-08 RX ORDER — LITHIUM CARBONATE 300 MG/1
300 CAPSULE ORAL 2 TIMES DAILY WITH MEALS
Qty: 14 CAPSULE | Refills: 3 | Status: SHIPPED | OUTPATIENT
Start: 2024-01-08 | End: 2024-01-15

## 2024-01-08 RX ORDER — CITALOPRAM HYDROBROMIDE 10 MG/1
10 TABLET ORAL DAILY
Qty: 7 TABLET | Refills: 3 | Status: SHIPPED | OUTPATIENT
Start: 2024-01-09 | End: 2024-01-16

## 2024-01-08 RX ADMIN — LITHIUM CARBONATE 300 MG: 300 CAPSULE, GELATIN COATED ORAL at 09:44

## 2024-01-08 RX ADMIN — LEVOTHYROXINE SODIUM 50 MCG: 0.05 TABLET ORAL at 06:06

## 2024-01-08 RX ADMIN — DOCUSATE SODIUM 100 MG: 100 CAPSULE, LIQUID FILLED ORAL at 09:44

## 2024-01-08 RX ADMIN — CITALOPRAM HYDROBROMIDE 10 MG: 20 TABLET ORAL at 09:44

## 2024-01-08 RX ADMIN — FOLIC ACID 1 MG: 1 TABLET ORAL at 09:44

## 2024-01-08 NOTE — GROUP NOTE
Group Therapy Note    Attendees: 11                                                                    Group Therapy Note    Date: 1/8/2024  Start Time: 1030  End Time: 1045  Number of Participants: 11    Type of Group: Community Meeting    Patient's Goal: Increased awareness of unit expectations and information. Patients ID achievable goal for the day.    Notes: Pt ID goal as \"Get home in time for lunch.\"    Status After Intervention:  Improved    Participation Level: Active Listener and Interactive    Participation Quality: Appropriate, Attentive, Sharing, and Supportive      Speech:  normal      Thought Process/Content: Logical  Linear      Affective Functioning: Congruent      Mood:  Approrpiate      Level of consciousness:  Alert and Attentive      Response to Learning: Able to verbalize current knowledge/experience, Able to verbalize/acknowledge new learning, Able to retain information, Capable of insight, and Progressing to goal      Endings: None Reported    Modes of Intervention: Community Meeting      Discipline Responsible: Psychoeducational Specialist      Signature:  FRED BlackS

## 2024-01-08 NOTE — PLAN OF CARE
Problem: Anxiety  Goal: Will report anxiety at manageable levels  Description: INTERVENTIONS:  1. Administer medication as ordered  2. Teach and rehearse alternative coping skills  3. Provide emotional support with 1:1 interaction with staff  1/3/2024 2025 by Cary Mercado RN  Outcome: Progressing     Problem: Coping  Goal: Pt/Family able to verbalize concerns and demonstrate effective coping strategies  Description: INTERVENTIONS:  1. Assist patient/family to identify coping skills, available support systems and cultural and spiritual values  2. Provide emotional support, including active listening and acknowledgement of concerns of patient and caregivers  3. Reduce environmental stimuli, as able  4. Instruct patient/family in relaxation techniques, as appropriate  5. Assess for spiritual pain/suffering and initiate Spiritual Care, Psychosocial Clinical Specialist consults as needed  1/3/2024 2025 by Cary Mercado RN  Outcome: Progressing     Problem: Decision Making  Goal: Pt/Family able to effectively weigh alternatives and participate in decision making related to treatment and care  Description: INTERVENTIONS:  1. Determine when there are differences between patient's view, family's view, and healthcare provider's view of condition  2. Facilitate patient and family articulation of goals for care  3. Help patient and family identify pros/cons of alternative solutions  4. Provide information as requested by patient/family  5. Respect patient/family right to receive or not to receive information  6. Serve as a liaison between patient and family and health care team  7. Initiate Consults from Ethics, Palliative Care or initiate Family Care Conference as is appropriate  1/3/2024 2025 by Cary Mercado RN  Outcome: Progressing     Problem: Confusion  Goal: Confusion, delirium, dementia, or psychosis is improved or at baseline  Description: INTERVENTIONS:  1. Assess for possible contributors to thought 
  Problem: Anxiety  Goal: Will report anxiety at manageable levels  Description: INTERVENTIONS:  1. Administer medication as ordered  2. Teach and rehearse alternative coping skills  3. Provide emotional support with 1:1 interaction with staff  1/6/2024 2249 by Senthil Don, RN  Outcome: Progressing     Problem: Coping  Goal: Pt/Family able to verbalize concerns and demonstrate effective coping strategies  Description: INTERVENTIONS:  1. Assist patient/family to identify coping skills, available support systems and cultural and spiritual values  2. Provide emotional support, including active listening and acknowledgement of concerns of patient and caregivers  3. Reduce environmental stimuli, as able  4. Instruct patient/family in relaxation techniques, as appropriate  5. Assess for spiritual pain/suffering and initiate Spiritual Care, Psychosocial Clinical Specialist consults as needed  Outcome: Progressing     Problem: Confusion  Goal: Confusion, delirium, dementia, or psychosis is improved or at baseline  Description: INTERVENTIONS:  1. Assess for possible contributors to thought disturbance, including medications, impaired vision or hearing, underlying metabolic abnormalities, dehydration, psychiatric diagnoses, and notify attending LIP  2. [place] high risk fall precautions, as indicated  3. Provide frequent short contacts to provide reality reorientation, refocusing and direction  4. Decrease environmental stimuli, including noise as appropriate  5. Monitor and intervene to maintain adequate nutrition, hydration, elimination, sleep and activity  6. If unable to ensure safety without constant attention obtain sitter and review sitter guidelines with assigned personnel  7. Initiate Psychosocial CNS and Spiritual Care consult, as indicated  1/6/2024 2249 by Senthil Don, RN  Outcome: Progressing     Patient in day room area with other patients watching television. Appears flat but brightens on 
  Problem: Anxiety  Goal: Will report anxiety at manageable levels  Description: INTERVENTIONS:  1. Administer medication as ordered  2. Teach and rehearse alternative coping skills  3. Provide emotional support with 1:1 interaction with staff  Outcome: Not Progressing     Problem: Decision Making  Goal: Pt/Family able to effectively weigh alternatives and participate in decision making related to treatment and care  Description: INTERVENTIONS:  1. Determine when there are differences between patient's view, family's view, and healthcare provider's view of condition  2. Facilitate patient and family articulation of goals for care  3. Help patient and family identify pros/cons of alternative solutions  4. Provide information as requested by patient/family  5. Respect patient/family right to receive or not to receive information  6. Serve as a liaison between patient and family and health care team  7. Initiate Consults from Ethics, Palliative Care or initiate Family Care Conference as is appropriate  Outcome: Not Progressing     Problem: Depression/Self Harm  Goal: Effect of psychiatric condition will be minimized and patient will be protected from self harm  Description: INTERVENTIONS:  1. Assess impact of patient's symptoms on level of functioning, self care needs and offer support as indicated  2. Assess patient/family knowledge of depression, impact on illness and need for teaching  3. Provide emotional support, presence and reassurance  4. Assess for possible suicidal thoughts or ideation. If patient expresses suicidal thoughts or statements do not leave alone, initiate Suicide Precautions, move to a room close to the nursing station and obtain sitter  5. Initiate consults as appropriate with Mental Health Professional, Spiritual Care, Psychosocial CNS, and consider a recommendation to the LIP for a Psychiatric Consultation  Outcome: Not Progressing     
  Problem: Anxiety  Goal: Will report anxiety at manageable levels  Description: INTERVENTIONS:  1. Administer medication as ordered  2. Teach and rehearse alternative coping skills  3. Provide emotional support with 1:1 interaction with staff  Outcome: Progressing     Problem: Coping  Goal: Pt/Family able to verbalize concerns and demonstrate effective coping strategies  Description: INTERVENTIONS:  1. Assist patient/family to identify coping skills, available support systems and cultural and spiritual values  2. Provide emotional support, including active listening and acknowledgement of concerns of patient and caregivers  3. Reduce environmental stimuli, as able  4. Instruct patient/family in relaxation techniques, as appropriate  5. Assess for spiritual pain/suffering and initiate Spiritual Care, Psychosocial Clinical Specialist consults as needed  Outcome: Progressing     Problem: Confusion  Goal: Confusion, delirium, dementia, or psychosis is improved or at baseline  Description: INTERVENTIONS:  1. Assess for possible contributors to thought disturbance, including medications, impaired vision or hearing, underlying metabolic abnormalities, dehydration, psychiatric diagnoses, and notify attending LIP  2. [place] high risk fall precautions, as indicated  3. Provide frequent short contacts to provide reality reorientation, refocusing and direction  4. Decrease environmental stimuli, including noise as appropriate  5. Monitor and intervene to maintain adequate nutrition, hydration, elimination, sleep and activity  6. If unable to ensure safety without constant attention obtain sitter and review sitter guidelines with assigned personnel  7. Initiate Psychosocial CNS and Spiritual Care consult, as indicated  Outcome: Progressing     Patient sitting out in day room with other patients watching television. Appears flat but brightens on approach. States, \"I'm good.\" Patient friendly and cooperative. Denies any 
  Problem: Confusion  Goal: Confusion, delirium, dementia, or psychosis is improved or at baseline  Description: INTERVENTIONS:  1. Assess for possible contributors to thought disturbance, including medications, impaired vision or hearing, underlying metabolic abnormalities, dehydration, psychiatric diagnoses, and notify attending LIP  2. [place] high risk fall precautions, as indicated  3. Provide frequent short contacts to provide reality reorientation, refocusing and direction  4. Decrease environmental stimuli, including noise as appropriate  5. Monitor and intervene to maintain adequate nutrition, hydration, elimination, sleep and activity  6. If unable to ensure safety without constant attention obtain sitter and review sitter guidelines with assigned personnel  7. Initiate Psychosocial CNS and Spiritual Care consult, as indicated  Outcome: Progressing     Problem: Anxiety  Goal: Will report anxiety at manageable levels  Description: INTERVENTIONS:  1. Administer medication as ordered  2. Teach and rehearse alternative coping skills  3. Provide emotional support with 1:1 interaction with staff  Outcome: Not Progressing     Problem: Depression/Self Harm  Goal: Effect of psychiatric condition will be minimized and patient will be protected from self harm  Description: INTERVENTIONS:  1. Assess impact of patient's symptoms on level of functioning, self care needs and offer support as indicated  2. Assess patient/family knowledge of depression, impact on illness and need for teaching  3. Provide emotional support, presence and reassurance  4. Assess for possible suicidal thoughts or ideation. If patient expresses suicidal thoughts or statements do not leave alone, initiate Suicide Precautions, move to a room close to the nursing station and obtain sitter  5. Initiate consults as appropriate with Mental Health Professional, Spiritual Care, Psychosocial CNS, and consider a recommendation to the LIP for a 
  Problem: Confusion  Goal: Confusion, delirium, dementia, or psychosis is improved or at baseline  Description: INTERVENTIONS:  1. Assess for possible contributors to thought disturbance, including medications, impaired vision or hearing, underlying metabolic abnormalities, dehydration, psychiatric diagnoses, and notify attending LIP  2. [place] high risk fall precautions, as indicated  3. Provide frequent short contacts to provide reality reorientation, refocusing and direction  4. Decrease environmental stimuli, including noise as appropriate  5. Monitor and intervene to maintain adequate nutrition, hydration, elimination, sleep and activity  6. If unable to ensure safety without constant attention obtain sitter and review sitter guidelines with assigned personnel  7. Initiate Psychosocial CNS and Spiritual Care consult, as indicated  Outcome: Progressing     Problem: Behavior  Goal: Pt/Family maintain appropriate behavior and adhere to behavioral management agreement, if implemented  Description: INTERVENTIONS:  1. Assess patient/family's coping skills and  non-compliant behavior (including use of illegal substances)  2. Notify security of behavior or suspected illegal substances which indicate the need for search of the family and/or belongings  3. Encourage verbalization of thoughts and concerns in a socially appropriate manner  4. Utilize positive, consistent limit setting strategies supporting safety of patient, staff and others  5. Encourage participation in the decision making process about the behavioral management agreement  6. If a visitor's behavior poses a threat to safety call refer to organization policy.  7. Initiate consult with , Psychosocial CNS, Spiritual Care as appropriate  Outcome: Progressing     Problem: Depression/Self Harm  Goal: Effect of psychiatric condition will be minimized and patient will be protected from self harm  Description: INTERVENTIONS:  1. Assess impact of 
Behavioral Health Early  Day 3 Interdisciplinary Treatment Plan NOTE    Review Date & Time: 1/6/2024  12:35 PM     Patient was in treatment team    Estimated Length of Stay Update:  5 to 7 days  Estimated Discharge Date Update: 1/9/24    EDUCATION:   Learner Progress Toward Treatment Goals: Reviewed results and recommendations of this team, Reviewed group plan and strategies, Reviewed signs, symptoms and risk of self harm and violent behavior, and Reviewed goals and plan of care    Method: Group    Outcome: Verbalized understanding    PATIENT GOALS: \"to get my clothes    PLAN/TREATMENT RECOMMENDATIONS UPDATE:encourage daily goals and groups    GOALS UPDATE:   Time frame for Short-Term Goals: today      Ifrah Soto RN  
Patient denies SI/HI/Hallucinations. Patient in control of behaviors. Patient denies anxiety and depression and has been medication compliant. No active distress noted, respirations even and unlabored. Will continue to monitor and will intervene as needed.       Problem: Anxiety  Goal: Will report anxiety at manageable levels  Description: INTERVENTIONS:  1. Administer medication as ordered  2. Teach and rehearse alternative coping skills  3. Provide emotional support with 1:1 interaction with staff  Outcome: Progressing     Problem: Coping  Goal: Pt/Family able to verbalize concerns and demonstrate effective coping strategies  Description: INTERVENTIONS:  1. Assist patient/family to identify coping skills, available support systems and cultural and spiritual values  2. Provide emotional support, including active listening and acknowledgement of concerns of patient and caregivers  3. Reduce environmental stimuli, as able  4. Instruct patient/family in relaxation techniques, as appropriate  5. Assess for spiritual pain/suffering and initiate Spiritual Care, Psychosocial Clinical Specialist consults as needed  Outcome: Progressing     Problem: Death & Dying  Goal: Pt/Family communicate acceptance of impending death and feel psychological comfort and peace  Description: INTERVENTIONS:  1. Assess patient/family anxiety and grief process related to end of life issues  2. Provide emotional and spiritual support  3. Provide information about the patient's health status with consideration of family and cultural values  4. Communicate willingness to discuss death and facilitate grief process  with patient/family as appropriate  5. Emphasize sustaining relationships within family system and community, or thomas/spiritual traditions  6. Initiate Spiritual Care, Psychosocial Clinical Specialist, consult as needed  Outcome: Progressing     Problem: Change in Body Image  Goal: Pt/Family communicate acceptance of loss or change in 
spiritual support  3. Provide information about the patient's health status with consideration of family and cultural values  4. Communicate willingness to discuss loss and facilitate grief process with patient/family as appropriate  5. Emphasize sustaining relationships within family system and community, or thomas/spiritual traditions  6. Initiate Spiritual Care, Psychosocial Clinical Specialist consult as needed  Outcome: Progressing

## 2024-01-08 NOTE — GROUP NOTE
Group Therapy Note    Attendees: 11                                                                    Group Therapy Note    Date: 1/8/2024  Start Time: 1045  End Time: 1125  Number of Participants: 11    Type of Group: Psychoeducation    Name: Coping skills dice game     Patient's Goal: Patient provided 1-2 step instructions for group. Patients rolled dice and answered coping skills question related to number on dice rolled. Patients ID positive coping skills and increased knowledge of mental health topics.     Notes: CTRS facilitated positive group discussion and encouraged patient to share. Patient was actively engaged in group discussion.    Status After Intervention:  Improved    Participation Level: Active Listener and Interactive    Participation Quality: Appropriate, Attentive, Sharing, and Supportive      Speech:  normal      Thought Process/Content: Logical  Linear      Affective Functioning: Congruent      Mood:  Appropriate      Level of consciousness:  Alert and Attentive      Response to Learning: Able to verbalize current knowledge/experience, Able to verbalize/acknowledge new learning, Able to retain information, Capable of insight, Able to change behavior, and Progressing to goal      Endings: None Reported    Modes of Intervention: Education      Discipline Responsible: Psychoeducational Specialist      Signature:  FRED BlackS

## 2024-01-08 NOTE — PROGRESS NOTES
[facility]  Admission Note   Patient denies SI/HI/Hallucinations. Patient is admitted from [facility]. Patient states that is having increase in sucidial thoughts since he can't get out of his ex wifes house and that he needs a new place to live. Patient has had significant suicide attempts in the past. Patient has no plan and agrees to safety plan. Patient cooperative for assessment. Rates anxiety a 6 and depression a 5. Patient is in control of behaviors. No active distress noted. Will continue to monitor and will intervene as needed with close 15 minute rounds.         Admission Type:   Admission Type: Involuntary    Reason for admission:  Reason for Admission: worsening feeling of sucide      Addictive Behavior:        Medical Problems:   Past Medical History:   Diagnosis Date    Cancer ([facility])     Depression     Major depressive disorder, recurrent episode, severe ([facility]) 9/14/2011    MDD (major depressive disorder), recurrent severe, without psychosis ([facility])     Overactive bladder 9/14/2011    Prostate cancer ([facility])     Suicidal ideations     Urinary incontinence        Status EXAM:  Mental Status and Behavioral Exam  Normal: No  Level of Assistance: Independent/Self  Facial Expression: Expressionless  Affect: Appropriate  Level of Consciousness: Alert  Frequency of Checks: 4 times per hour, close  Mood:Normal: No  Mood: Depressed  Motor Activity:Normal: No  Motor Activity: Unusual posture/gait  Eye Contact: Fair  Observed Behavior: Cooperative, Friendly  Sexual Misconduct History: Current - no  Preception: [place] to person, [place] to time, [place] to place, [place] to situation  Attention:Normal: No  Attention: Unable to concentrate  Thought Processes: Other (comment)  Thought Content:Normal: No  Thought Content: Other (comment)  Depression Symptoms: Feelings of helplessness, Feelings of hopelessess, Feelings of worthlessness  Anxiety Symptoms: Generalized  Maribeth Symptoms: No problems reported or 
4 Eyes Skin Assessment     NAME:  Cuco Romero  YOB: 1950  MEDICAL RECORD NUMBER:  [id number]    The patient is being assessed for  Admission    I agree that at least one RN has performed a thorough Head to Toe Skin Assessment on the patient. ALL assessment sites listed below have been assessed.      Areas assessed by both nurses:    Head, Face, Ears, Shoulders, Back, Chest, Arms, Elbows, Hands, Sacrum. Buttock, Coccyx, Ischium, Legs. Feet and Heels, and Under Medical Devices         Does the Patient have a Wound? No noted wound(s)       Jet Prevention initiated by RN: no    Wound Care Orders initiated by RN: no      Pressure Injury (Stage 3,4, Unstageable, DTI, NWPT, and Complex wounds) if present, place Wound referral order by RN under : No    New Ostomies, if present place, Ostomy referral order under : No     Nurse 1 eSignature: Electronically signed by Lala Higgins RN on 1/3/24 at 4:57 PM EST    **SHARE this note so that the co-signing nurse can place an eSignature**    Nurse 2 eSignature: {Esignature:[id number]}  
BEHAVIORAL HEALTH FOLLOW-UP NOTE     1/5/2024     Patient was seen and examined in person, Chart reviewed   Patient's case discussed with staff/team    Chief Complaint: \"I don't need to be here\"    Interim History:   Patient in his room today he states that he does not feel he needs to be here and is ready to go home.  He is unable to identify what has changed since admission that would prevent him from attempting to overdose on his lithium again.  He has been inconsistent on reporting what medication he attempted to overdose on and he has been inconsistent in reporting the timeframe of which he did this.  He states that he did take a handful of pills and expected to not wake up.  Some people he has told that he took lithium others just random pills.  He has significant stressors at home as he continues to reside with his ex-wife.  He feels that things are better now since he talked to her yesterday and they \"communicated better\" however on evaluation he is not able to identify any protective factors or reasons that would stop him from attempting suicide in the future if there is again more stressors between him and his ex-wife.  Limited insight into the factors contributing to his hospitalization.  Patient does have chronic suicidal ideation at his baseline.    Appetite:   [x] Normal/Unchanged  [] Increased  [] Decreased      Sleep:       [x] Normal/Unchanged  [] Fair       [] Poor              Energy:    [] Normal/Unchanged  [] Increased  [x] Decreased        SI [x] Present  [] Absent    HI  [x]Present  [] Absent     Aggression:  [] yes  [x] no    Patient is [x] able  [] unable to CONTRACT FOR SAFETY     PAST MEDICAL/PSYCHIATRIC HISTORY:   Past Medical History:   Diagnosis Date    Cancer (HCC)     Depression     Major depressive disorder, recurrent episode, severe (HCC) 9/14/2011    MDD (major depressive disorder), recurrent severe, without psychosis (HCC)     Overactive bladder 9/14/2011    Prostate cancer (HCC)  
BEHAVIORAL HEALTH FOLLOW-UP NOTE     1/6/2024     Patient was seen and examined in person, Chart reviewed   Patient's case discussed with staff/team    Chief Complaint: \"Do you know where my other bag of clothes are\"    Interim History:   I saw patient this morning in his room he is the main focus is on where his other bag of clothing is.  He denies suicidal or homicidal ideations intent or plan he denies any auditory or visual hallucinations he states he is eating well and sleeping well there are no neurovegetative signs of depression and no overt or covert signs psychosis no behavior disturbances noted      Appetite:   [x] Normal/Unchanged  [] Increased  [] Decreased      Sleep:       [x] Normal/Unchanged  [] Fair       [] Poor              Energy:    [] Normal/Unchanged  [] Increased  [x] Decreased        SI [x] Present  [] Absent    HI  [x]Present  [] Absent     Aggression:  [] yes  [x] no    Patient is [x] able  [] unable to CONTRACT FOR SAFETY     PAST MEDICAL/PSYCHIATRIC HISTORY:   Past Medical History:   Diagnosis Date    Cancer ([facility])     Depression     Major depressive disorder, recurrent episode, severe ([facility]) 9/14/2011    MDD (major depressive disorder), recurrent severe, without psychosis ([facility])     Overactive bladder 9/14/2011    Prostate cancer ([facility])     Suicidal ideations     Urinary incontinence        FAMILY/SOCIAL HISTORY:  Family History   Problem Relation Age of Onset    Cancer Sister     Depression Sister     Cancer Brother     Depression Brother      Social History     Socioeconomic History    Marital status:      Spouse name: Not on file    Number of children: 0    Years of education: Not on file    Highest education level: Not on file   Occupational History    Not on file   Tobacco Use    Smoking status: Never    Smokeless 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 
BEHAVIORAL HEALTH FOLLOW-UP NOTE     1/7/2024     Patient was seen and examined in person, Chart reviewed   Patient's case discussed with staff/team    Chief Complaint: \"I am doing better\"    Interim History:   Patient's sitting out in the milieu states he is doing \"better.\"  He is out watching TV relaxed visible in the milieu denies suicidal or homicidal ideations intent or plan denies auditory or visual hallucinations denies any side effects to medications and states he is talked to his wife and things are \"good.\"  No overt or covert signs psychosis no neurovegetative signs of depression his affect brightens on approach he is future focused looking forward to seeing his dog on discharge    Appetite:   [x] Normal/Unchanged  [] Increased  [] Decreased      Sleep:       [x] Normal/Unchanged  [] Fair       [] Poor              Energy:    [] Normal/Unchanged  [] Increased  [x] Decreased        SI [x] Present  [] Absent    HI  [x]Present  [] Absent     Aggression:  [] yes  [x] no    Patient is [x] able  [] unable to CONTRACT FOR SAFETY     PAST MEDICAL/PSYCHIATRIC HISTORY:   Past Medical History:   Diagnosis Date    Cancer ([facility])     Depression     Major depressive disorder, recurrent episode, severe (HCC) 9/14/2011    MDD (major depressive disorder), recurrent severe, without psychosis ([facility])     Overactive bladder 9/14/2011    Prostate cancer ([facility])     Suicidal ideations     Urinary incontinence        FAMILY/SOCIAL HISTORY:  Family History   Problem Relation Age of Onset    Cancer Sister     Depression Sister     Cancer Brother     Depression Brother      Social History     Socioeconomic History    Marital status:      Spouse name: Not on file    Number of children: 0    Years of education: Not on file    Highest education level: Not on file   Occupational History    Not on file   Tobacco Use    Smoking status: Never    Smokeless tobacco: Never   Vaping Use    Vaping Use: Never used   Substance and Sexual 
[facility] [place]  Discharge Note    Pt discharged with followings belongings:   Dental Appliances: None  Vision - Corrective Lenses: None  Hearing Aid: None  Jewelry: None  Body Piercings Removed: N/A  Clothing: Pants, Slippers, Socks, Undergarments, Shorts  Other Valuables: Money, Wallet, Keys   All Valuables sent home with patient. Patient in no active distress, denies SI/HI/Hallucinations. Patient discharged and future focused.   Status EXAM upon discharge:  Mental Status and Behavioral Exam  Normal: No  Level of Assistance: Independent/Self  Facial Expression: Flat  Affect: Blunt  Level of Consciousness: Alert  Frequency of Checks: 4 times per hour, close  Mood:Normal: Yes  Mood: Anxious, Depressed  Motor Activity:Normal: Yes  Motor Activity: Decreased  Eye Contact: Good  Observed Behavior: Cooperative, Friendly  Sexual Misconduct History: Current - no  Preception: [place] to person, [place] to time, [place] to place, [place] to situation  Attention:Normal: No  Attention: Distractible  Thought Processes: Circumstantial  Thought Content:Normal: Yes  Thought Content: Other (comment)  Depression Symptoms: No problems reported or observed.  Anxiety Symptoms: Generalized  Maribeth Symptoms: No problems reported or observed.  Hallucinations: None  Delusions: No  Memory:Normal: Yes  Memory: Poor recent  Insight and Judgment: No  Insight and Judgment: Poor insight, Poor judgment    Tobacco Screening:  Practical Counseling, on admission, sarah X, if applicable and completed (first 3 are required if patient doesn't refuse):            ( ) Recognizing danger situations (included triggers and roadblocks)                    ( ) Coping skills (new ways to manage stress,relaxation techniques, changing routine, distraction)                                                           ( ) Basic information about quitting (benefits of quitting, techniques in how to quit, available resources  ( ) Referral for counseling faxed to 
Denies SI/HI  denies hallucinations   mood is depressed  attending groups  cooperative with meds  will continue to monitor  
Leisure assessment complete.  
No change to previous assessment patient denies SI/HI/Hallucinations. Patient in control and no active distress noted. Will continue to monitor and will intervene as needed with close 15 minute rounds.     
No change to previous assessment. Patient denies SI/HI/Hallucinations. Patient in control of behavior. No active distress noted, respirations even and unlabored. Will continue to monitor closely and will intervene as needed.     
OQ and safety plan complete.   
OQ assessment complete.   
Patient declined invitation to education group- Topic Grounding Techniques.  A handout was provided to patient.  Patient will continue to be provided with opportunities to enhance leisure skills/interests and/or coping mechanisms.   
Patient declined invitation to the following groups    Community Meeting  Education- a handout on today's group topic- self- care was provided to patient.     Patient will continue to be provided with opportunities to enhance leisure skills/interests and/or coping mechanisms.   
Patient only comes out for the football game. Patient is medication compliant. Patient denies si/hi/avh. No complaints of discomfort. Patient looks underlying irritable. Patient speaks very little.   
Patient resting quietly with eyes closed. No S/S of distress noted or observed. Prn medications Vistaril and Melatonin given at this time. Will continue to monitor, provide needs and safe environment with continue rounding every 15 minutes.  
Patient sleeping during community meeting and did not respond to verbal invitation.  Patient will continue to be provided with opportunities to enhance leisure skills/interests and/or coping mechanisms.   
Patient was out on unit watching tv. No interaction independently unless spoken to.  Alert and oriented x 4.  Affect is flat, but brightens with conversation.  Denies homicidal,auditory,visual hallucinations. Verbalizes suicidal thoughts do to unknown what is he is going to do on discharge and to where.  Verbalizes depression and anxiety 10 out of 10 do to the unknown of what is coming next for him.  Appetite is good.  Sleeping good.  No behaviors observed or reported.  Attended groups.  Compliant with medications.  Q 15 minute safety rounds continue.  
Pt attended afternoon smoking cessation group. Pt appeared to be an active listener. Pt is able to share appropriately when prompted and asked facilitator relevant questions.  Pt was participant 1 of 7.    Electronically signed by Yaz Eddy on 1/5/2024 at 4:08 PM     
Q6H PRN, Kary Cuellar MD    melatonin tablet 3 mg, 3 mg, Oral, Nightly PRN, Kary Cuellar MD, 3 mg at 01/03/24 2125    levothyroxine (SYNTHROID) tablet 50 mcg, 50 mcg, Oral, Daily, Lala Melton APRN - CNP, 50 mcg at 01/04/24 0602    folic acid (FOLVITE) tablet 1 mg, 1 mg, Oral, Daily, Lala Melton APRN - CNP, 1 mg at 01/03/24 1133    donepezil (ARICEPT) tablet 10 mg, 10 mg, Oral, Nightly, Lala Melton APRN - CNP, 10 mg at 01/03/24 2125    docusate sodium (COLACE) capsule 100 mg, 100 mg, Oral, BID, Lala Melton APRN - CNP, 100 mg at 01/03/24 2125    lithium capsule 300 mg, 300 mg, Oral, BID WC, Lala Melton APRN - CNP, 300 mg at 01/03/24 1652    citalopram (CELEXA) tablet 10 mg, 10 mg, Oral, Daily, Lala Melton APRN - CNP, 10 mg at 01/03/24 1344      Examination:  /67   Pulse 60   Temp 98 °F (36.7 °C) (Temporal)   Resp 16   Ht 1.905 m (6' 3\")   Wt 73.9 kg (163 lb)   SpO2 96%   BMI 20.37 kg/m²   Gait - steady  Medication side effects(SE): None reported    Mental Status Examination:    Level of consciousness:  within normal limits   Appearance:  fair grooming and fair hygiene  Behavior/Motor:  no abnormalities noted  Attitude toward examiner:  cooperative  Speech:  spontaneous, normal rate and normal volume   Mood: \"I am depressed.\"  Affect:  blunted  thought processes: Linear thought flight of ideas loose associations  Thought content: Devoid of any auditory visual hallucinations delusions or other perceptual normalities.  States he always feels suicidal denies/HI intent or plan   Language: able to name objects and repeate phrases  Remote Memory: intact  Recent Memory: intact  Cognition:  oriented to person, place, and time   Fund of Knowledge: Vocabulary intact, pt is aware of current events and past history  Attetion and Concentration intact  Insight poor  Judgement poor    ASSESSMENT:   Patient symptoms are:  [] Well controlled  [] Improving  [] Worsening  [x] No

## 2024-01-08 NOTE — CARE COORDINATION
JOHNIE met with pt to discuss discharge. Pt found in common area watching TV. He states that he is feeling better and feels ready to discharge home. He states that he has no source of transportation to get home today, is not comfortable with a bus and has no money for a taxi. Pt denied SI/HI/AVH, [facility]     JOHNIE contacted pt () wife Maueren [phone] (ALEXIS signed) to discuss pt discharge. She states that she has no concerns for pt discharge today. She states that she is unable to transport pt today due to her medical condition and her sister is usually the one who would transport, but she is unavailable today. She states that pt will need to figure out a way to get home.     Taxi Voucher home was approved by JOHNIE supervisor and JOHNIE provided voucher to assigned RN.     JOHNIE contacted [facility] [phone] to schedule appointment for pt. Pt has appointments 1/9 at 2pm in office for medications and counseling 1/10 at 10:30am in office.     In order to ensure appropriate transition and discharge planning is in place, the following documents have been transmitted to [facility], as the new outpatient provider:    The d/c diagnosis was transmitted to the next care provider  The reason for hospitalization was transmitted to the next care provider  The d/c medications (dosage and indication) were transmitted to the next care provider   The continuing care plan was transmitted to the next care provider

## 2024-01-08 NOTE — DISCHARGE SUMMARY
<10 07/14/2023 04:35 PM     Lab Results   Component Value Date/Time    TSH 3.10 10/23/2023 06:06 PM     Lab Results   Component Value Date    LITHIUM 0.6 01/08/2024     No results found for: \"VALPROATE\", \"CBMZ\"    RISK ASSESSMENT AT DISCHARGE: Low risk for suicide and homicide.     Treatment Plan:  The patient's diagnosis, treatment plan, medication management were formulated after patient was seen directly by the attending physician and myself and all relevant documentation was reviewed.    Risk, benefit, side effects, possible outcomes of the medication and alternatives discussed with the patient and the patient demonstrated understanding.  The patient was also educated that the outcome of treatment will depend on the medication compliance as directed by the prescribers along with regular follow-up, compliance with the labs and other work-up, as clinically indicated.    Risks, benefits, side effects, drug-to-drug interactions and alternatives to treatment were discussed.    Encourage patient to attend outpatient follow up appointment and therapy, appointment scheduled prior to discharge.    Patient was advised to call the outpatient provider, visit the nearest ED or call 911 if symptoms are not manageable.     Patient's family member was contacted prior to the discharge, patient discharged home with his ex-wife and psychiatrically stable condition.         Medication List        CHANGE how you take these medications      citalopram 10 MG tablet  Commonly known as: CELEXA  Take 1 tablet by mouth daily for 7 days  Start taking on: January 9, 2024  What changed: when to take this            CONTINUE taking these medications      docusate 100 MG Caps  Commonly known as: COLACE, DULCOLAX  Take 100 mg by mouth 2 times daily     donepezil 10 MG tablet  Commonly known as: ARICEPT     folic acid 1 MG tablet  Commonly known as: FOLVITE     levothyroxine 50 MCG tablet  Commonly known as: SYNTHROID     lithium 300 MG

## 2024-01-09 NOTE — CARE COORDINATION
SW attempted to contact pt post discharge for follow up call. Pt was unable to be reached at this time.

## 2024-06-10 ENCOUNTER — HOSPITAL ENCOUNTER (EMERGENCY)
Age: 74
Discharge: ANOTHER HOSPITAL | End: 2024-06-11
Attending: EMERGENCY MEDICINE
Payer: OTHER GOVERNMENT

## 2024-06-10 VITALS
RESPIRATION RATE: 16 BRPM | HEART RATE: 64 BPM | OXYGEN SATURATION: 96 % | BODY MASS INDEX: 21.25 KG/M2 | WEIGHT: 170 LBS | SYSTOLIC BLOOD PRESSURE: 112 MMHG | DIASTOLIC BLOOD PRESSURE: 67 MMHG | TEMPERATURE: 98 F

## 2024-06-10 DIAGNOSIS — R45.851 SUICIDAL IDEATION: Primary | ICD-10-CM

## 2024-06-10 LAB
ALBUMIN SERPL-MCNC: 4.3 G/DL (ref 3.5–5.2)
ALP SERPL-CCNC: 77 U/L (ref 40–129)
ALT SERPL-CCNC: 9 U/L (ref 0–40)
AMPHET UR QL SCN: NEGATIVE
ANION GAP SERPL CALCULATED.3IONS-SCNC: 12 MMOL/L (ref 7–16)
APAP SERPL-MCNC: <5 UG/ML (ref 10–30)
AST SERPL-CCNC: 20 U/L (ref 0–39)
BARBITURATES UR QL SCN: NEGATIVE
BASOPHILS # BLD: 0.02 K/UL (ref 0–0.2)
BASOPHILS NFR BLD: 0 % (ref 0–2)
BENZODIAZ UR QL: NEGATIVE
BILIRUB SERPL-MCNC: 0.6 MG/DL (ref 0–1.2)
BILIRUB UR QL STRIP: NEGATIVE
BUN SERPL-MCNC: 15 MG/DL (ref 6–23)
BUPRENORPHINE UR QL: NEGATIVE
CALCIUM SERPL-MCNC: 9 MG/DL (ref 8.6–10.2)
CANNABINOIDS UR QL SCN: NEGATIVE
CHLORIDE SERPL-SCNC: 105 MMOL/L (ref 98–107)
CLARITY UR: CLEAR
CO2 SERPL-SCNC: 24 MMOL/L (ref 22–29)
COCAINE UR QL SCN: NEGATIVE
COLOR UR: YELLOW
COMMENT: NORMAL
CREAT SERPL-MCNC: 1.3 MG/DL (ref 0.7–1.2)
EKG ATRIAL RATE: 63 BPM
EKG P AXIS: 55 DEGREES
EKG P-R INTERVAL: 160 MS
EKG Q-T INTERVAL: 414 MS
EKG QRS DURATION: 72 MS
EKG QTC CALCULATION (BAZETT): 423 MS
EKG R AXIS: 45 DEGREES
EKG T AXIS: 48 DEGREES
EKG VENTRICULAR RATE: 63 BPM
EOSINOPHIL # BLD: 0.24 K/UL (ref 0.05–0.5)
EOSINOPHILS RELATIVE PERCENT: 5 % (ref 0–6)
ERYTHROCYTE [DISTWIDTH] IN BLOOD BY AUTOMATED COUNT: 13.4 % (ref 11.5–15)
ETHANOLAMINE SERPL-MCNC: <10 MG/DL (ref 0–0.08)
FENTANYL UR QL: NEGATIVE
GFR, ESTIMATED: 60 ML/MIN/1.73M2
GLUCOSE SERPL-MCNC: 72 MG/DL (ref 74–99)
GLUCOSE UR STRIP-MCNC: NEGATIVE MG/DL
HGB BLD-MCNC: 13.5 G/DL (ref 12.5–16.5)
HGB UR QL STRIP.AUTO: NEGATIVE
IMM GRANULOCYTES # BLD AUTO: <0.03 K/UL (ref 0–0.58)
IMM GRANULOCYTES NFR BLD: 0 % (ref 0–5)
KETONES UR STRIP-MCNC: NEGATIVE MG/DL
LEUKOCYTE ESTERASE UR QL STRIP: NEGATIVE
LYMPHOCYTES NFR BLD: 1.09 K/UL (ref 1.5–4)
LYMPHOCYTES RELATIVE PERCENT: 22 % (ref 20–42)
MCH RBC QN AUTO: 31.8 PG (ref 26–35)
MCHC RBC AUTO-ENTMCNC: 32.6 G/DL (ref 32–34.5)
MCV RBC AUTO: 97.4 FL (ref 80–99.9)
METHADONE UR QL: NEGATIVE
MONOCYTES NFR BLD: 0.57 K/UL (ref 0.1–0.95)
MONOCYTES NFR BLD: 11 % (ref 2–12)
NEUTROPHILS NFR BLD: 62 % (ref 43–80)
NEUTS SEG NFR BLD: 3.15 K/UL (ref 1.8–7.3)
NITRITE UR QL STRIP: NEGATIVE
OPIATES UR QL SCN: NEGATIVE
OXYCODONE UR QL SCN: NEGATIVE
PCP UR QL SCN: NEGATIVE
PH UR STRIP: 6 [PH] (ref 5–9)
PLATELET # BLD AUTO: 182 K/UL (ref 130–450)
PMV BLD AUTO: 10.1 FL (ref 7–12)
POTASSIUM SERPL-SCNC: 4.4 MMOL/L (ref 3.5–5)
PROT SERPL-MCNC: 6.5 G/DL (ref 6.4–8.3)
PROT UR STRIP-MCNC: NEGATIVE MG/DL
RBC # BLD AUTO: 4.25 M/UL (ref 3.8–5.8)
SALICYLATES SERPL-MCNC: <0.3 MG/DL (ref 0–30)
SARS-COV-2 RDRP RESP QL NAA+PROBE: NOT DETECTED
SODIUM SERPL-SCNC: 141 MMOL/L (ref 132–146)
SP GR UR STRIP: 1.02 (ref 1–1.03)
SPECIMEN DESCRIPTION: NORMAL
TEST INFORMATION: NORMAL
TOXIC TRICYCLIC SC,BLOOD: NEGATIVE
UROBILINOGEN UR STRIP-ACNC: 0.2 EU/DL (ref 0–1)
WBC OTHER # BLD: 5.1 K/UL (ref 4.5–11.5)

## 2024-06-10 PROCEDURE — G0480 DRUG TEST DEF 1-7 CLASSES: HCPCS

## 2024-06-10 PROCEDURE — 85025 COMPLETE CBC W/AUTO DIFF WBC: CPT

## 2024-06-10 PROCEDURE — 80307 DRUG TEST PRSMV CHEM ANLYZR: CPT

## 2024-06-10 PROCEDURE — 87635 SARS-COV-2 COVID-19 AMP PRB: CPT

## 2024-06-10 PROCEDURE — 99285 EMERGENCY DEPT VISIT HI MDM: CPT

## 2024-06-10 PROCEDURE — 80179 DRUG ASSAY SALICYLATE: CPT

## 2024-06-10 PROCEDURE — 93010 ELECTROCARDIOGRAM REPORT: CPT | Performed by: INTERNAL MEDICINE

## 2024-06-10 PROCEDURE — 81003 URINALYSIS AUTO W/O SCOPE: CPT

## 2024-06-10 PROCEDURE — 80143 DRUG ASSAY ACETAMINOPHEN: CPT

## 2024-06-10 PROCEDURE — 80053 COMPREHEN METABOLIC PANEL: CPT

## 2024-06-10 PROCEDURE — 93005 ELECTROCARDIOGRAM TRACING: CPT | Performed by: EMERGENCY MEDICINE

## 2024-06-10 ASSESSMENT — PAIN - FUNCTIONAL ASSESSMENT
PAIN_FUNCTIONAL_ASSESSMENT: NONE - DENIES PAIN
PAIN_FUNCTIONAL_ASSESSMENT: NONE - DENIES PAIN

## 2024-06-10 ASSESSMENT — LIFESTYLE VARIABLES
HOW MANY STANDARD DRINKS CONTAINING ALCOHOL DO YOU HAVE ON A TYPICAL DAY: PATIENT DOES NOT DRINK
HOW OFTEN DO YOU HAVE A DRINK CONTAINING ALCOHOL: NEVER

## 2024-06-10 NOTE — ED PROVIDER NOTES
HPI:  6/10/24, Time: 5:19 PM EDT         Cuco Romero is a 73 y.o. male presenting to the ED for psychiatric evaluation.  Patient states he is suicidal.  He told [facility] this, and he was sent in for evaluation.  Has history remote attempts in the past.  Nothing recent.  No other symptoms or complaints.      Review of Systems:   A complete review of systems was performed and pertinent positives and negatives are stated within HPI, all other systems reviewed and are negative.          --------------------------------------------- PAST HISTORY ---------------------------------------------  Past Medical History:  has a past medical history of Cancer (HCC), Depression, Major depressive disorder, recurrent episode, severe (HCC), MDD (major depressive disorder), recurrent severe, without psychosis (HCC), Overactive bladder, Prostate cancer (HCC), Suicidal ideations, and Urinary incontinence.    Past Surgical History:  has a past surgical history that includes other surgical history (02/21/2014); back surgery; Prostatectomy; Appendectomy; Tonsillectomy; and Colonoscopy.    Social History:  reports that he has never smoked. He has never used smokeless tobacco. He reports that he does not drink alcohol and does not use drugs.    Family History: family history includes Cancer in his brother and sister; Depression in his brother and sister.     The patient’s home medications have been reviewed.    Allergies: Ketamine    -------------------------------------------------- RESULTS -------------------------------------------------  All laboratory and radiology results have been personally reviewed by myself   LABS:  Results for orders placed or performed during the hospital encounter of 06/10/24   COVID-19, Rapid    Specimen: Nasopharyngeal Swab   Result Value Ref Range    Specimen Description .NASOPHARYNGEAL SWAB     SARS-CoV-2, Rapid Not Detected Not Detected   CBC with Auto Differential   Result Value Ref Range

## 2024-06-10 NOTE — ED NOTES
received a call from [facility] who reports patient has been accepted the the ED for a psych evaluation by Dr Robles.     [facility] is to be called to [phone] ext [phone] or ext [phone].      will complete back side pf involuntary hold and refax.     Issa requested and ETA to be called to him at [phone] ext [phone].      called [facility] at [phone] and spoke to Chavez and was given and ETA of 7AM tomorrow.   requested transportation to be outsourced.  Chavez states \"I can't try if management allows me to.\"

## 2024-06-10 NOTE — ED NOTES
Pt's personal belongings placed in locker 31=shirt, pants, socks, shoes, wallet, keys, and cell phone. Pt has 2 dollars in wallet-pt states to leave wallet with personal belongings not security.

## 2024-06-10 NOTE — ED NOTES
reached out to [facility] at [phone] ext [phone] to inquire on bed status. At this time no one answered and a voicemail was left.      will faxed over a packet of information to [phone] once patient is [facility].     Awaiting a call back.

## 2024-06-10 NOTE — ED NOTES
Behavioral Health Crisis Assessment     Chief Complaint:  Patient reports he is here due to suicidal ideation.     Mental Status Exam:  Pt is alert oriented x 4, friendly, good historian, flat affect, depressed, overall sad, no signs of agitation, calm and cooperative behavior, thought process appears lucid, forgetful at times, speech soft and clear, normal eye contact, well-groomed.    Legal Status  [] Voluntary:  [x] Involuntary, Issued by: [facility] outpatient clinic      Gender  [x] Male [] Female [] Transgender  [] Other     Sexual Orientation    [x] Heterosexual [] Homosexual [] Bisexual [] Other     Brief Clinical Summary:  Patient has a 73-year-old who presented to the ED by EMS due to suicidal ideation.  [facility].  Patient explained to  he [facility]mar[facility] Sanz today and it was decided for him to be hospitalized at [facility].    Patient states they have been  for 23 years and have been legally  for almost a year now.  Patient states they still live together for their dog.  Patient does admit to some worsening tension between him and his wife.  Patient states she is a \"germ phobe.\"  Patient explained that he has to wash his hands each time he enters the house, even if he did not even touch anything.  Patient states he just tries to comply to avoid arguments.  Patient does note that their relationship is coming to an end.  Patient does admit to chronic suicidal ideation and depression.  Patient denies any current plan or specific thoughts or intent at this time.  Patient does admit to constantly thinking about dying thought.  Patient does admit to a lifetime of 3 suicide attempts with his last attempt being in January when overdosing on lithium.  Patient denies any HI, self-injurious behaviors, A/V hallucinations or paranoia.    Patient denies any drug/alcohol use.  Patient does admit to a

## 2024-06-10 NOTE — ED NOTES
Patient is on a involuntary hold [facility] outpatient clinic for \"Mr. Romero is a 73-year-old  with severe treatment resistant depression and history of multiple suicide attempts.  Most recently he reported he had overdosed with lithium in January of this year.  He had been flat high risk for suicide July 2023 until May 2024.  Vet reports depression and has been escalating over the last several weeks and he has been contemplating all types of methods.  Thinking about this has been on daily basis, more and more frequently.  He states he has decided on jumping off the [place] or a bridge.  Only thing that has kept him from doing it is fear, cities no actual protective factors.  He describes his depression one of major despair.  No attempts at treatment with meds, ECT and rTMS have helped.  Hopeless and helpless.  Has defined his depression as \"anger, self loathing, so questioning despair.\"  That agrees that he needs hospitalization at this point as he is not sure if he can keep himself safe any longer.\"

## 2024-06-11 ASSESSMENT — PAIN - FUNCTIONAL ASSESSMENT: PAIN_FUNCTIONAL_ASSESSMENT: NONE - DENIES PAIN

## 2024-06-11 NOTE — ED NOTES
Called to give Nurse to nurse [facility], was placed on hold for 10 minutes without answer. Nurse said he was going to \"grab a piece of paper\".

## 2024-06-11 NOTE — ED NOTES
faxed involuntary hold to [facility].      attempted to call Issa back at [phone] ext [phone], however no one answered at this time and voicemail was left with ETA.

## 2024-06-11 NOTE — ED NOTES
Transfer/ambulance packet completed and ready for transport.    Patient updated on placement.     spoke to stuart and confirmed ETA.

## 2024-07-08 ENCOUNTER — HOSPITAL ENCOUNTER (EMERGENCY)
Age: 74
Discharge: PSYCHIATRIC OR SUBSTANCE USE FACILITY | End: 2024-07-08
Attending: EMERGENCY MEDICINE
Payer: OTHER GOVERNMENT

## 2024-07-08 VITALS
DIASTOLIC BLOOD PRESSURE: 77 MMHG | SYSTOLIC BLOOD PRESSURE: 111 MMHG | OXYGEN SATURATION: 97 % | RESPIRATION RATE: 16 BRPM | TEMPERATURE: 97.9 F | HEART RATE: 73 BPM

## 2024-07-08 DIAGNOSIS — R45.851 SUICIDAL IDEATION: Primary | ICD-10-CM

## 2024-07-08 LAB
ALBUMIN SERPL-MCNC: 4.3 G/DL (ref 3.5–5.2)
ALP SERPL-CCNC: 82 U/L (ref 40–129)
ALT SERPL-CCNC: 13 U/L (ref 0–40)
AMPHET UR QL SCN: NEGATIVE
ANION GAP SERPL CALCULATED.3IONS-SCNC: 10 MMOL/L (ref 7–16)
APAP SERPL-MCNC: <5 UG/ML (ref 10–30)
AST SERPL-CCNC: 21 U/L (ref 0–39)
BARBITURATES UR QL SCN: NEGATIVE
BASOPHILS # BLD: 0.03 K/UL (ref 0–0.2)
BASOPHILS NFR BLD: 1 % (ref 0–2)
BENZODIAZ UR QL: NEGATIVE
BILIRUB SERPL-MCNC: 0.9 MG/DL (ref 0–1.2)
BUN SERPL-MCNC: 13 MG/DL (ref 6–23)
BUPRENORPHINE UR QL: NEGATIVE
CALCIUM SERPL-MCNC: 9.1 MG/DL (ref 8.6–10.2)
CANNABINOIDS UR QL SCN: NEGATIVE
CHLORIDE SERPL-SCNC: 105 MMOL/L (ref 98–107)
CO2 SERPL-SCNC: 25 MMOL/L (ref 22–29)
COCAINE UR QL SCN: NEGATIVE
CREAT SERPL-MCNC: 1.3 MG/DL (ref 0.7–1.2)
EOSINOPHIL # BLD: 0.18 K/UL (ref 0.05–0.5)
EOSINOPHILS RELATIVE PERCENT: 3 % (ref 0–6)
ERYTHROCYTE [DISTWIDTH] IN BLOOD BY AUTOMATED COUNT: 13.4 % (ref 11.5–15)
ETHANOLAMINE SERPL-MCNC: <10 MG/DL (ref 0–0.08)
FENTANYL UR QL: NEGATIVE
GFR, ESTIMATED: 58 ML/MIN/1.73M2
GLUCOSE SERPL-MCNC: 80 MG/DL (ref 74–99)
HCT VFR BLD AUTO: 43.3 % (ref 37–54)
HGB BLD-MCNC: 14.3 G/DL (ref 12.5–16.5)
IMM GRANULOCYTES # BLD AUTO: 0.03 K/UL (ref 0–0.58)
IMM GRANULOCYTES NFR BLD: 1 % (ref 0–5)
LYMPHOCYTES NFR BLD: 1.43 K/UL (ref 1.5–4)
LYMPHOCYTES RELATIVE PERCENT: 25 % (ref 20–42)
MCH RBC QN AUTO: 32.3 PG (ref 26–35)
MCHC RBC AUTO-ENTMCNC: 33 G/DL (ref 32–34.5)
MCV RBC AUTO: 97.7 FL (ref 80–99.9)
METHADONE UR QL: NEGATIVE
MONOCYTES NFR BLD: 0.66 K/UL (ref 0.1–0.95)
MONOCYTES NFR BLD: 11 % (ref 2–12)
NEUTROPHILS NFR BLD: 60 % (ref 43–80)
NEUTS SEG NFR BLD: 3.48 K/UL (ref 1.8–7.3)
OPIATES UR QL SCN: NEGATIVE
OXYCODONE UR QL SCN: NEGATIVE
PCP UR QL SCN: NEGATIVE
PLATELET # BLD AUTO: 198 K/UL (ref 130–450)
PMV BLD AUTO: 9.8 FL (ref 7–12)
POTASSIUM SERPL-SCNC: 4.3 MMOL/L (ref 3.5–5)
PROT SERPL-MCNC: 6.8 G/DL (ref 6.4–8.3)
RBC # BLD AUTO: 4.43 M/UL (ref 3.8–5.8)
SALICYLATES SERPL-MCNC: <0.3 MG/DL (ref 0–30)
SARS-COV-2 RDRP RESP QL NAA+PROBE: NOT DETECTED
SODIUM SERPL-SCNC: 140 MMOL/L (ref 132–146)
SPECIMEN DESCRIPTION: NORMAL
T4 SERPL-MCNC: 7.8 UG/DL (ref 4.5–11.7)
TEST INFORMATION: NORMAL
TOXIC TRICYCLIC SC,BLOOD: NEGATIVE
TSH SERPL DL<=0.05 MIU/L-ACNC: 1.93 UIU/ML (ref 0.27–4.2)
WBC OTHER # BLD: 5.8 K/UL (ref 4.5–11.5)

## 2024-07-08 PROCEDURE — G0480 DRUG TEST DEF 1-7 CLASSES: HCPCS

## 2024-07-08 PROCEDURE — 80143 DRUG ASSAY ACETAMINOPHEN: CPT

## 2024-07-08 PROCEDURE — 80307 DRUG TEST PRSMV CHEM ANLYZR: CPT

## 2024-07-08 PROCEDURE — 99285 EMERGENCY DEPT VISIT HI MDM: CPT

## 2024-07-08 PROCEDURE — 87635 SARS-COV-2 COVID-19 AMP PRB: CPT

## 2024-07-08 PROCEDURE — 80179 DRUG ASSAY SALICYLATE: CPT

## 2024-07-08 PROCEDURE — 80053 COMPREHEN METABOLIC PANEL: CPT

## 2024-07-08 PROCEDURE — 85025 COMPLETE CBC W/AUTO DIFF WBC: CPT

## 2024-07-08 PROCEDURE — 84443 ASSAY THYROID STIM HORMONE: CPT

## 2024-07-08 PROCEDURE — 84436 ASSAY OF TOTAL THYROXINE: CPT

## 2024-07-08 ASSESSMENT — LIFESTYLE VARIABLES: HOW OFTEN DO YOU HAVE A DRINK CONTAINING ALCOHOL: NEVER

## 2024-07-08 ASSESSMENT — ENCOUNTER SYMPTOMS
SHORTNESS OF BREATH: 0
EYE REDNESS: 0
ABDOMINAL PAIN: 0
VOMITING: 0
NAUSEA: 0

## 2024-07-08 ASSESSMENT — PAIN - FUNCTIONAL ASSESSMENT: PAIN_FUNCTIONAL_ASSESSMENT: NONE - DENIES PAIN

## 2024-07-08 NOTE — ED NOTES
Behavioral Health Crisis Assessment      Chief Complaint: The patient is a 74-year-old  male who [facility] the [facility] due to [facility] ideation with a plan.    Mental Status Exam: The patient presents calm and cooperative with a flat affect and congruent mood.  He is oriented x 3 and is a poor historian.  He admits to a diagnosis of dementia making it difficult for him to recall events and dates.  He has intense eye contact that causes his gaze to look outward and fair hygiene.  He has poor judgment and insight.  He denies a history of hallucinations and homicidal ideation.  He reports current SI.  His speech is soft and normal and his thought process is linear.    Legal Status:  [] Voluntary:  [x] Involuntary, [facility] by: [facility]    Gender:  [x] Male [] Female [] Transgender  [] Other    Sexual Orientation:  [x] Heterosexual [] Homosexual [] Bisexual [] Other    Brief Clinical Summary:  The pt stated that [facility] and it went well.   He stated that on his way home he drove by the [employer] and looked over the side after he stopped.  He stated that he was scouting it out for future reference.  He stated that he felt suicidal all weekend and was going to drive back to the bridge and waited until today and called the [facility].  He stated that they had him come in and then they pink slipped him here.  He stated that that he is unsure of what the trigger is- no recent new stressor and no med change.  However according to the patient's pink slip the patient stated to the [facility] that he was depressed because he got news last Friday that his application was denied to go to the [facility] in [place] and he had been waiting for admission there for several months.  He reports that he was going to stay there because him and his wife are getting .  He reports a history of several attempts but could not really say what they were or what happened.  However,

## 2024-07-08 NOTE — ED NOTES
Per dr valentine pt doesn't require constant observation while In section G. Pt to be placed on q 15 min monitored checks

## 2024-07-08 NOTE — ED NOTES
received a call from intake from [facility] regarding patient.   spoke to Lico at [phone] ext [phone].      updated child on patient not being quite medically cleared.    Once patient is evaluated and a referral is to be faxed to [phone].

## 2024-07-08 NOTE — ED NOTES
Call from [facility] at the [facility]- the pt was accepted by dr. Adams and they are sending [facility] to  the pt.  Informed Dr Iam Almazan of need for covid test which she ordered.  ROLANDA Kumari aware.

## 2024-07-08 NOTE — ED PROVIDER NOTES
[facility] EMERGENCY DEPARTMENT  EMERGENCY DEPARTMENT ENCOUNTER        Pt Name: Cuco Romero  MRN: [id number]  Birthdate 1950  Date of evaluation: 7/8/2024  Provider: Natalie Almazan DO  PCP: Yogesh Aguilar MD  Note Started: 2:58 PM EDT 7/8/24    CHIEF COMPLAINT       Chief Complaint   Patient presents with    Suicidal     SI with plan to jump off [address] bridge- sent over by [facility]- pt went to [facility] on Friday to plan but did not attempt       HISTORY OF PRESENT ILLNESS: 1 or more Elements       Cuco Romero is a 74 y.o. male who presents to the emergency department the chief complaint of suicidal ideation.  The patient states that he is a  he was attempting to get into  housing he states that he was informed on Friday that he was declined from the [employer].  He states this made him suicidal.  He states he went to the [address] bridge he states that he was planning to jump off the [address] bridge.  [facility] and was referred into the emergency department and he was pink slipped.  Patient denies any homicidal ideation. There are no associated fevers, chills, lightheadedness, nasal congestion, chest pain, shortness of breath, cough, nausea, vomiting, abdominal pain, diarrhea, constipation, dysuria or hematuria. The patient has no other stated complaints at this time.      Nursing Notes were all reviewed and agreed with or any disagreements were addressed in the HPI.    REVIEW OF SYSTEMS :      Review of Systems   Constitutional:  Negative for fever.   HENT:  Negative for congestion.    Eyes:  Negative for redness.   Respiratory:  Negative for shortness of breath.    Cardiovascular:  Negative for chest pain.   Gastrointestinal:  Negative for abdominal pain, nausea and vomiting.   Genitourinary:  Negative for dysuria.   Musculoskeletal:  Negative for arthralgias.   Skin:  Negative for rash.

## 2024-07-12 LAB
EKG ATRIAL RATE: 57 BPM
EKG P AXIS: 58 DEGREES
EKG P-R INTERVAL: 158 MS
EKG Q-T INTERVAL: 438 MS
EKG QRS DURATION: 70 MS
EKG QTC CALCULATION (BAZETT): 426 MS
EKG R AXIS: 46 DEGREES
EKG T AXIS: 57 DEGREES
EKG VENTRICULAR RATE: 57 BPM

## 2025-01-17 ENCOUNTER — HOSPITAL ENCOUNTER (EMERGENCY)
Age: 75
Discharge: HOME | End: 2025-01-17
Attending: EMERGENCY MEDICINE
Payer: OTHER GOVERNMENT

## 2025-01-17 ENCOUNTER — APPOINTMENT (OUTPATIENT)
Dept: CT IMAGING | Age: 75
End: 2025-01-17
Attending: EMERGENCY MEDICINE
Payer: OTHER GOVERNMENT

## 2025-01-17 VITALS
WEIGHT: 160 LBS | DIASTOLIC BLOOD PRESSURE: 78 MMHG | HEART RATE: 86 BPM | SYSTOLIC BLOOD PRESSURE: 127 MMHG | HEIGHT: 75 IN | RESPIRATION RATE: 17 BRPM | OXYGEN SATURATION: 98 % | BODY MASS INDEX: 19.89 KG/M2 | TEMPERATURE: 98.3 F

## 2025-01-17 DIAGNOSIS — R42 VERTIGO: Primary | ICD-10-CM

## 2025-01-17 LAB
ALBUMIN SERPL-MCNC: 4.6 G/DL (ref 3.5–5.2)
ALP SERPL-CCNC: 104 U/L (ref 40–129)
ALT SERPL-CCNC: 10 U/L (ref 0–40)
ANION GAP SERPL CALCULATED.3IONS-SCNC: 11 MMOL/L (ref 7–16)
AST SERPL-CCNC: 20 U/L (ref 0–39)
BASOPHILS # BLD: 0.02 K/UL (ref 0–0.2)
BASOPHILS NFR BLD: 0 % (ref 0–2)
BILIRUB SERPL-MCNC: 0.5 MG/DL (ref 0–1.2)
BUN SERPL-MCNC: 16 MG/DL (ref 6–23)
CALCIUM SERPL-MCNC: 9.3 MG/DL (ref 8.6–10.2)
CHLORIDE SERPL-SCNC: 104 MMOL/L (ref 98–107)
CO2 SERPL-SCNC: 26 MMOL/L (ref 22–29)
CREAT SERPL-MCNC: 1.2 MG/DL (ref 0.7–1.2)
EKG ATRIAL RATE: 71 BPM
EKG P AXIS: 61 DEGREES
EKG P-R INTERVAL: 152 MS
EKG Q-T INTERVAL: 402 MS
EKG QRS DURATION: 74 MS
EKG QTC CALCULATION (BAZETT): 436 MS
EKG R AXIS: 79 DEGREES
EKG T AXIS: 20 DEGREES
EKG VENTRICULAR RATE: 71 BPM
EOSINOPHIL # BLD: 0.09 K/UL (ref 0.05–0.5)
EOSINOPHILS RELATIVE PERCENT: 2 % (ref 0–6)
ERYTHROCYTE [DISTWIDTH] IN BLOOD BY AUTOMATED COUNT: 13.2 % (ref 11.5–15)
GFR, ESTIMATED: 66 ML/MIN/1.73M2
GLUCOSE SERPL-MCNC: 97 MG/DL (ref 74–99)
HCT VFR BLD AUTO: 47.2 % (ref 37–54)
HGB BLD-MCNC: 15.7 G/DL (ref 12.5–16.5)
IMM GRANULOCYTES # BLD AUTO: <0.03 K/UL (ref 0–0.58)
IMM GRANULOCYTES NFR BLD: 0 % (ref 0–5)
LYMPHOCYTES NFR BLD: 0.99 K/UL (ref 1.5–4)
LYMPHOCYTES RELATIVE PERCENT: 21 % (ref 20–42)
MAGNESIUM SERPL-MCNC: 2.3 MG/DL (ref 1.6–2.6)
MCH RBC QN AUTO: 31.7 PG (ref 26–35)
MCHC RBC AUTO-ENTMCNC: 33.3 G/DL (ref 32–34.5)
MCV RBC AUTO: 95.2 FL (ref 80–99.9)
MONOCYTES NFR BLD: 0.49 K/UL (ref 0.1–0.95)
MONOCYTES NFR BLD: 10 % (ref 2–12)
NEUTROPHILS NFR BLD: 66 % (ref 43–80)
NEUTS SEG NFR BLD: 3.17 K/UL (ref 1.8–7.3)
PLATELET # BLD AUTO: 224 K/UL (ref 130–450)
PMV BLD AUTO: 10 FL (ref 7–12)
POTASSIUM SERPL-SCNC: 4.4 MMOL/L (ref 3.5–5)
PROT SERPL-MCNC: 7.1 G/DL (ref 6.4–8.3)
RBC # BLD AUTO: 4.96 M/UL (ref 3.8–5.8)
SODIUM SERPL-SCNC: 141 MMOL/L (ref 132–146)
WBC OTHER # BLD: 4.8 K/UL (ref 4.5–11.5)

## 2025-01-17 PROCEDURE — 70450 CT HEAD/BRAIN W/O DYE: CPT

## 2025-01-17 PROCEDURE — 6370000000 HC RX 637 (ALT 250 FOR IP): Performed by: EMERGENCY MEDICINE

## 2025-01-17 PROCEDURE — 99284 EMERGENCY DEPT VISIT MOD MDM: CPT

## 2025-01-17 PROCEDURE — 83735 ASSAY OF MAGNESIUM: CPT

## 2025-01-17 PROCEDURE — 80053 COMPREHEN METABOLIC PANEL: CPT

## 2025-01-17 PROCEDURE — 85025 COMPLETE CBC W/AUTO DIFF WBC: CPT

## 2025-01-17 PROCEDURE — 36415 COLL VENOUS BLD VENIPUNCTURE: CPT

## 2025-01-17 RX ORDER — MECLIZINE HCL 12.5 MG 12.5 MG/1
25 TABLET ORAL ONCE
Status: COMPLETED | OUTPATIENT
Start: 2025-01-17 | End: 2025-01-17

## 2025-01-17 RX ADMIN — MECLIZINE 25 MG: 12.5 TABLET ORAL at 17:06

## 2025-01-17 NOTE — ED PROVIDER NOTES
ED PROVIDER NOTE    Chief Complaint   Patient presents with    Fatigue     Increased weakness over couple weeks.        HPI:  1/17/25,   Time: 4:47 PM CONRAD Romero is a 74 y.o. male presenting to the ED for dizziness.  Ongoing for the last several months intermittently, worse today.  Gets intermittent room spinning dizziness.  Associated vision changes.  He did fall recently due to the dizziness.  No new medications.  No fever, chills, cough, chest pain, shortness of breath, abdominal pain, nausea, vomiting, diarrhea.  Normal p.o. intake and urine output.  No black or bloody stools.  No drug or alcohol use.    Chart review: hx of depression, OAB, prostate ca    Reviewed CT head from 8/15/2023:  No acute intracranial abnormality    Review of Systems:     Review of Systems  Pertinent positives and negatives as stated in HPI     --------------------------------------------- PAST HISTORY ---------------------------------------------  Past Medical History:   Past Medical History:   Diagnosis Date    Cancer (HCC)     Depression     Major depressive disorder, recurrent episode, severe (HCC) 9/14/2011    MDD (major depressive disorder), recurrent severe, without psychosis (HCC)     Overactive bladder 9/14/2011    Prostate cancer ([facility])     Suicidal ideations     Urinary incontinence        Past Surgical History:   Past Surgical History:   Procedure Laterality Date    APPENDECTOMY      BACK SURGERY      lumbar disc, and cervical vertabrae    COLONOSCOPY      OTHER SURGICAL HISTORY  02/21/2014    Myelogram    PROSTATECTOMY      TONSILLECTOMY         Social History:   Social History     Socioeconomic History    Marital status:      Spouse name: None    Number of children: 0    Years of education: None    Highest education level: None   Tobacco Use    Smoking status: Never    Smokeless tobacco: Never   Vaping Use    Vaping status: Never Used   Substance and Sexual Activity    Alcohol use: No     Comment:

## 2025-01-18 NOTE — DISCHARGE INSTRUCTIONS
Return to the ER if you have severe headache, vomiting, unable to keep down food or drink, difficulty walking, talking, or seeing, stiffness in your neck, or any other new or concerning symptoms.    Follow up with your primary care physician at the next available appointment and discuss obtaining an MRI of the BRAIN.

## 2025-01-20 LAB
EKG ATRIAL RATE: 71 BPM
EKG P AXIS: 61 DEGREES
EKG P-R INTERVAL: 152 MS
EKG Q-T INTERVAL: 402 MS
EKG QRS DURATION: 74 MS
EKG QTC CALCULATION (BAZETT): 436 MS
EKG R AXIS: 79 DEGREES
EKG T AXIS: 20 DEGREES
EKG VENTRICULAR RATE: 71 BPM

## 2025-01-23 ENCOUNTER — HOSPITAL ENCOUNTER (EMERGENCY)
Age: 75
Discharge: ANOTHER HOSPITAL | End: 2025-01-23
Attending: EMERGENCY MEDICINE
Payer: OTHER GOVERNMENT

## 2025-01-23 ENCOUNTER — APPOINTMENT (OUTPATIENT)
Dept: GENERAL RADIOLOGY | Age: 75
End: 2025-01-23
Payer: OTHER GOVERNMENT

## 2025-01-23 VITALS
OXYGEN SATURATION: 97 % | RESPIRATION RATE: 18 BRPM | TEMPERATURE: 98.1 F | HEART RATE: 69 BPM | DIASTOLIC BLOOD PRESSURE: 73 MMHG | SYSTOLIC BLOOD PRESSURE: 104 MMHG

## 2025-01-23 DIAGNOSIS — T18.9XXA SWALLOWED FOREIGN BODY, INITIAL ENCOUNTER: ICD-10-CM

## 2025-01-23 DIAGNOSIS — F39 MOOD DISORDER (HCC): Primary | ICD-10-CM

## 2025-01-23 LAB
ALBUMIN SERPL-MCNC: 4.3 G/DL (ref 3.5–5.2)
ALP SERPL-CCNC: 95 U/L (ref 40–129)
ALT SERPL-CCNC: 11 U/L (ref 0–40)
AMPHET UR QL SCN: NEGATIVE
ANION GAP SERPL CALCULATED.3IONS-SCNC: 13 MMOL/L (ref 7–16)
APAP SERPL-MCNC: <5 UG/ML (ref 10–30)
AST SERPL-CCNC: 27 U/L (ref 0–39)
BARBITURATES UR QL SCN: NEGATIVE
BASOPHILS # BLD: 0.03 K/UL (ref 0–0.2)
BASOPHILS NFR BLD: 1 % (ref 0–2)
BENZODIAZ UR QL: NEGATIVE
BILIRUB SERPL-MCNC: 0.5 MG/DL (ref 0–1.2)
BUN SERPL-MCNC: 13 MG/DL (ref 6–23)
BUPRENORPHINE UR QL: NEGATIVE
CALCIUM SERPL-MCNC: 9.4 MG/DL (ref 8.6–10.2)
CANNABINOIDS UR QL SCN: NEGATIVE
CHLORIDE SERPL-SCNC: 103 MMOL/L (ref 98–107)
CO2 SERPL-SCNC: 22 MMOL/L (ref 22–29)
COCAINE UR QL SCN: NEGATIVE
CREAT SERPL-MCNC: 1.2 MG/DL (ref 0.7–1.2)
EKG ATRIAL RATE: 77 BPM
EKG P AXIS: 70 DEGREES
EKG P-R INTERVAL: 156 MS
EKG Q-T INTERVAL: 376 MS
EKG QRS DURATION: 70 MS
EKG QTC CALCULATION (BAZETT): 425 MS
EKG R AXIS: 60 DEGREES
EKG T AXIS: 71 DEGREES
EKG VENTRICULAR RATE: 77 BPM
EOSINOPHIL # BLD: 0.11 K/UL (ref 0.05–0.5)
EOSINOPHILS RELATIVE PERCENT: 2 % (ref 0–6)
ERYTHROCYTE [DISTWIDTH] IN BLOOD BY AUTOMATED COUNT: 13.1 % (ref 11.5–15)
ETHANOLAMINE SERPL-MCNC: <10 MG/DL (ref 0–0.08)
FENTANYL UR QL: NEGATIVE
GFR, ESTIMATED: 67 ML/MIN/1.73M2
GLUCOSE SERPL-MCNC: 103 MG/DL (ref 74–99)
HCT VFR BLD AUTO: 44.3 % (ref 37–54)
HGB BLD-MCNC: 14.8 G/DL (ref 12.5–16.5)
IMM GRANULOCYTES # BLD AUTO: <0.03 K/UL (ref 0–0.58)
IMM GRANULOCYTES NFR BLD: 0 % (ref 0–5)
LYMPHOCYTES NFR BLD: 1.01 K/UL (ref 1.5–4)
LYMPHOCYTES RELATIVE PERCENT: 22 % (ref 20–42)
MCH RBC QN AUTO: 31.4 PG (ref 26–35)
MCHC RBC AUTO-ENTMCNC: 33.4 G/DL (ref 32–34.5)
MCV RBC AUTO: 94.1 FL (ref 80–99.9)
METHADONE UR QL: NEGATIVE
MONOCYTES NFR BLD: 0.55 K/UL (ref 0.1–0.95)
MONOCYTES NFR BLD: 12 % (ref 2–12)
NEUTROPHILS NFR BLD: 64 % (ref 43–80)
NEUTS SEG NFR BLD: 2.99 K/UL (ref 1.8–7.3)
OPIATES UR QL SCN: NEGATIVE
OXYCODONE UR QL SCN: NEGATIVE
PCP UR QL SCN: NEGATIVE
PLATELET # BLD AUTO: 208 K/UL (ref 130–450)
PMV BLD AUTO: 9.5 FL (ref 7–12)
POTASSIUM SERPL-SCNC: 4.5 MMOL/L (ref 3.5–5)
PROT SERPL-MCNC: 6.8 G/DL (ref 6.4–8.3)
RBC # BLD AUTO: 4.71 M/UL (ref 3.8–5.8)
SALICYLATES SERPL-MCNC: <0.3 MG/DL (ref 0–30)
SODIUM SERPL-SCNC: 138 MMOL/L (ref 132–146)
TEST INFORMATION: NORMAL
TOXIC TRICYCLIC SC,BLOOD: NEGATIVE
WBC OTHER # BLD: 4.7 K/UL (ref 4.5–11.5)

## 2025-01-23 PROCEDURE — 74022 RADEX COMPL AQT ABD SERIES: CPT

## 2025-01-23 PROCEDURE — 80307 DRUG TEST PRSMV CHEM ANLYZR: CPT

## 2025-01-23 PROCEDURE — 6370000000 HC RX 637 (ALT 250 FOR IP): Performed by: EMERGENCY MEDICINE

## 2025-01-23 PROCEDURE — 80143 DRUG ASSAY ACETAMINOPHEN: CPT

## 2025-01-23 PROCEDURE — 80179 DRUG ASSAY SALICYLATE: CPT

## 2025-01-23 PROCEDURE — 99285 EMERGENCY DEPT VISIT HI MDM: CPT

## 2025-01-23 PROCEDURE — G0480 DRUG TEST DEF 1-7 CLASSES: HCPCS

## 2025-01-23 PROCEDURE — 80053 COMPREHEN METABOLIC PANEL: CPT

## 2025-01-23 PROCEDURE — 90792 PSYCH DIAG EVAL W/MED SRVCS: CPT | Performed by: NURSE PRACTITIONER

## 2025-01-23 PROCEDURE — 93005 ELECTROCARDIOGRAM TRACING: CPT | Performed by: EMERGENCY MEDICINE

## 2025-01-23 PROCEDURE — 93010 ELECTROCARDIOGRAM REPORT: CPT | Performed by: INTERNAL MEDICINE

## 2025-01-23 PROCEDURE — 85025 COMPLETE CBC W/AUTO DIFF WBC: CPT

## 2025-01-23 RX ORDER — MECLIZINE HCL 12.5 MG 12.5 MG/1
12.5 TABLET ORAL ONCE
Status: COMPLETED | OUTPATIENT
Start: 2025-01-23 | End: 2025-01-23

## 2025-01-23 RX ADMIN — MECLIZINE 12.5 MG: 12.5 TABLET ORAL at 14:20

## 2025-01-23 NOTE — CONSULTS
GENERAL SURGERY  CONSULT NOTE    Patient's Name/Date of Birth: Cuco Romero / 1950    Date: January 23, 2025     PCP: Yogesh Aguilar MD     Chief Complaint:   Chief Complaint   Patient presents with    Suicide Attempt     SI with attempt this week by cutting open 4 hearing-aid batteries and swallowing them. Pt states \"I woke up the next day and that didn't work, so I took two more on Tuesday.\" [facility] and therapist sent pt to ED.       Physician Consulted: Dr. Garces   Reason for Consult: Foreign body ingestion   Referring Physician: Dr. Lisa GARCIA  Cuco Romero is a 74 y.o. male who presents for evaluation of foreign body ingestion.  Patient states in an attempted suicide attempt he swallowed hearing aid batteries on Monday and then again on Wednesday.  Denies any abdominal pain.  Denies any nausea or emesis.  No allergies.  Acute abdominal series obtained in the ED shows batteries within the colon.      General surgery was consulted for the above.      Past Medical History:   Diagnosis Date    Cancer (HCC)     Depression     Major depressive disorder, recurrent episode, severe (HCC) 9/14/2011    MDD (major depressive disorder), recurrent severe, without psychosis (HCC)     Overactive bladder 9/14/2011    Prostate cancer (HCC)     Suicidal ideations     Urinary incontinence        Past Surgical History:   Procedure Laterality Date    APPENDECTOMY      BACK SURGERY      lumbar disc, and cervical vertabrae    COLONOSCOPY      OTHER SURGICAL HISTORY  02/21/2014    Myelogram    PROSTATECTOMY      TONSILLECTOMY         Medications Prior to Admission:    Prior to Admission medications    Medication Sig Start Date End Date Taking? Authorizing Provider   citalopram (CELEXA) 10 MG tablet Take 1 tablet by mouth daily for 7 days 1/9/24 1/16/24  Lala Melton, APRN - CNP   lithium 300 MG capsule Take 1 capsule by mouth 2 times daily (with meals) for 7 days 1/8/24 1/15/24  Geronimo

## 2025-01-23 NOTE — ED PROVIDER NOTES
for full 10 seconds   5B: Test Right Arm Motor Drift 0 - no drift, limb holds 90 (or 45) degrees for full 10 seconds   6A: Test Left Leg Motor Drift 0 - no drift; leg holds 30 degree position for full 5 seconds   6B: Test Right Leg Motor Drift 0 - no drift; leg holds 30 degree position for full 5 seconds   7: Test Limb Ataxia   (FNF/Heel-Shin) 0 - absent   8: Test Sensation 0 - normal; no sensory loss   9: Test Language/Aphasia 0 - no aphasia, normal   10: Test Dysarthria 0 - normal   11: Test Extinction/Inattention 0 - no abnormality   Total Score: 0   1/23/25 at 1:45 PM EST.      Acute CVA Core Measures:      - t-PA Eligibility: IV t-PA was considered and not given due to violations in inclusion criteria including NIH 0          DIAGNOSTIC RESULTS   LABS:    Labs Reviewed   CBC WITH AUTO DIFFERENTIAL - Abnormal; Notable for the following components:       Result Value    Lymphocytes Absolute 1.01 (*)     All other components within normal limits   COMPREHENSIVE METABOLIC PANEL - Abnormal; Notable for the following components:    Glucose 103 (*)     All other components within normal limits   SERUM DRUG SCREEN - Abnormal; Notable for the following components:    Acetaminophen Level <5 (*)     All other components within normal limits   URINE DRUG SCREEN       As interpreted by me, the above displayed labs are abnormal. All other labs obtained during this visit were within normal range or not returned as of this dictation.    EKG in clinical impression    Radiology reads  Interpretation per the Radiologist below, if available at the time of this note:    XR ACUTE ABD SERIES CHEST 1 VW   Final Result   1. 5 (possibly 6) radiopaque foreign bodies are present in right and central   aspect of the abdomen appearing similar in size and appearance measuring up   to 1 cm.   2. No bowel obstruction or pneumoperitoneum   3. No evidence of pneumonia or pleural effusion.           CT Head W/O Contrast    Result Date:  portions of this note were completed with a voice recognition program.  Efforts were made to edit the dictations but occasionally words are mis-transcribed.)    Electronically signed by Alejandro Bowser MD on 1/23/2025 at 8:46 PM           Accidental fall

## 2025-01-23 NOTE — ED NOTES
Patient will need telepsych consult for recommended disposition, if medically cleared in the ER.    Electronically signed by YOU Ramsey, RONNI on 1/23/2025 at 2:02 PM
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
## 2025-01-24 NOTE — ED NOTES
Attempted to call report to [facility], nurse told this RN they would call back in 10 minutes to receive report.

## 2025-01-24 NOTE — ED NOTES
Report given to [facility] Brennan [facility] from ROLANDA Velasco.   Report method by phone   The following was reviewed with receiving RN:   Current vital signs:  /73   Pulse 69   Temp 98.1 °F (36.7 °C)   Resp 18   SpO2 97%                      Any medication or safety alerts were reviewed. Any pending diagnostics and notifications were also reviewed, as well as any safety concerns or issues, abnormal labs, abnormal imaging, and abnormal assessment findings. Questions were answered.

## 2025-01-24 NOTE — ED NOTES
Per Issa the patient was accepted to the [facility] to go to the emergency room.  He was accepted by Dr. Ma.  Nurse to nurse to be called to [phone] extension [phone] or extension [phone].  Ask for charge nurse.  [facility] will  the patient @11pm.

## 2025-05-11 ENCOUNTER — HOSPITAL ENCOUNTER (EMERGENCY)
Age: 75
Discharge: PSYCHIATRIC OR SUBSTANCE USE FACILITY | End: 2025-05-11
Attending: EMERGENCY MEDICINE
Payer: OTHER GOVERNMENT

## 2025-05-11 VITALS
BODY MASS INDEX: 20.62 KG/M2 | TEMPERATURE: 98.1 F | WEIGHT: 165 LBS | RESPIRATION RATE: 16 BRPM | HEART RATE: 72 BPM | SYSTOLIC BLOOD PRESSURE: 115 MMHG | OXYGEN SATURATION: 96 % | DIASTOLIC BLOOD PRESSURE: 71 MMHG

## 2025-05-11 DIAGNOSIS — F32.A DEPRESSION WITH SUICIDAL IDEATION: Primary | ICD-10-CM

## 2025-05-11 DIAGNOSIS — R45.851 DEPRESSION WITH SUICIDAL IDEATION: Primary | ICD-10-CM

## 2025-05-11 LAB
ALBUMIN SERPL-MCNC: 4.4 G/DL (ref 3.5–5.2)
ALP SERPL-CCNC: 110 U/L (ref 40–129)
ALT SERPL-CCNC: 12 U/L (ref 0–50)
AMPHET UR QL SCN: NEGATIVE
ANION GAP SERPL CALCULATED.3IONS-SCNC: 13 MMOL/L (ref 7–16)
APAP SERPL-MCNC: <5 UG/ML (ref 10–30)
AST SERPL-CCNC: 32 U/L (ref 0–50)
BACTERIA URNS QL MICRO: ABNORMAL
BARBITURATES UR QL SCN: NEGATIVE
BASOPHILS # BLD: 0.02 K/UL (ref 0–0.2)
BASOPHILS NFR BLD: 0 % (ref 0–2)
BENZODIAZ UR QL: NEGATIVE
BILIRUB SERPL-MCNC: 0.7 MG/DL (ref 0–1.2)
BILIRUB UR QL STRIP: ABNORMAL
BUN SERPL-MCNC: 14 MG/DL (ref 8–23)
BUPRENORPHINE UR QL: NEGATIVE
CALCIUM SERPL-MCNC: 9.6 MG/DL (ref 8.8–10.2)
CANNABINOIDS UR QL SCN: NEGATIVE
CASTS #/AREA URNS LPF: ABNORMAL /LPF
CHLORIDE SERPL-SCNC: 107 MMOL/L (ref 98–107)
CLARITY UR: CLEAR
CO2 SERPL-SCNC: 22 MMOL/L (ref 22–29)
COCAINE UR QL SCN: NEGATIVE
COLOR UR: YELLOW
CREAT SERPL-MCNC: 1.3 MG/DL (ref 0.7–1.2)
CRYSTALS URNS MICRO: ABNORMAL /HPF
EOSINOPHIL # BLD: 0.08 K/UL (ref 0.05–0.5)
EOSINOPHILS RELATIVE PERCENT: 1 % (ref 0–6)
ERYTHROCYTE [DISTWIDTH] IN BLOOD BY AUTOMATED COUNT: 13.4 % (ref 11.5–15)
ETHANOLAMINE SERPL-MCNC: <10 MG/DL (ref 0–0.08)
FENTANYL UR QL: NEGATIVE
GFR, ESTIMATED: 58 ML/MIN/1.73M2
GLUCOSE SERPL-MCNC: 85 MG/DL (ref 74–99)
GLUCOSE UR STRIP-MCNC: NEGATIVE MG/DL
HCT VFR BLD AUTO: 44.5 % (ref 37–54)
HGB BLD-MCNC: 14.7 G/DL (ref 12.5–16.5)
HGB UR QL STRIP.AUTO: NEGATIVE
IMM GRANULOCYTES # BLD AUTO: <0.03 K/UL (ref 0–0.58)
IMM GRANULOCYTES NFR BLD: 0 % (ref 0–5)
KETONES UR STRIP-MCNC: 15 MG/DL
LEUKOCYTE ESTERASE UR QL STRIP: NEGATIVE
LYMPHOCYTES NFR BLD: 1.21 K/UL (ref 1.5–4)
LYMPHOCYTES RELATIVE PERCENT: 15 % (ref 20–42)
MCH RBC QN AUTO: 31.6 PG (ref 26–35)
MCHC RBC AUTO-ENTMCNC: 33 G/DL (ref 32–34.5)
MCV RBC AUTO: 95.7 FL (ref 80–99.9)
METHADONE UR QL: NEGATIVE
MONOCYTES NFR BLD: 0.71 K/UL (ref 0.1–0.95)
MONOCYTES NFR BLD: 9 % (ref 2–12)
NEUTROPHILS NFR BLD: 74 % (ref 43–80)
NEUTS SEG NFR BLD: 5.87 K/UL (ref 1.8–7.3)
NITRITE UR QL STRIP: NEGATIVE
OPIATES UR QL SCN: NEGATIVE
OXYCODONE UR QL SCN: NEGATIVE
PCP UR QL SCN: NEGATIVE
PH UR STRIP: 5.5 [PH] (ref 5–8)
PLATELET # BLD AUTO: 227 K/UL (ref 130–450)
PMV BLD AUTO: 9.6 FL (ref 7–12)
POTASSIUM SERPL-SCNC: 3.9 MMOL/L (ref 3.5–5.1)
PROT SERPL-MCNC: 7 G/DL (ref 6.4–8.3)
PROT UR STRIP-MCNC: ABNORMAL MG/DL
RBC # BLD AUTO: 4.65 M/UL (ref 3.8–5.8)
RBC #/AREA URNS HPF: ABNORMAL /HPF
SALICYLATES SERPL-MCNC: <0.5 MG/DL (ref 0–30)
SODIUM SERPL-SCNC: 143 MMOL/L (ref 136–145)
SP GR UR STRIP: >1.03 (ref 1–1.03)
TEST INFORMATION: NORMAL
TOXIC TRICYCLIC SC,BLOOD: NEGATIVE
TSH SERPL DL<=0.05 MIU/L-ACNC: 1.8 UIU/ML (ref 0.27–4.2)
UROBILINOGEN UR STRIP-ACNC: 1 EU/DL (ref 0–1)
WBC #/AREA URNS HPF: ABNORMAL /HPF
WBC OTHER # BLD: 7.9 K/UL (ref 4.5–11.5)

## 2025-05-11 PROCEDURE — G0480 DRUG TEST DEF 1-7 CLASSES: HCPCS

## 2025-05-11 PROCEDURE — 80307 DRUG TEST PRSMV CHEM ANLYZR: CPT

## 2025-05-11 PROCEDURE — 80143 DRUG ASSAY ACETAMINOPHEN: CPT

## 2025-05-11 PROCEDURE — 93005 ELECTROCARDIOGRAM TRACING: CPT | Performed by: EMERGENCY MEDICINE

## 2025-05-11 PROCEDURE — 80179 DRUG ASSAY SALICYLATE: CPT

## 2025-05-11 PROCEDURE — 99284 EMERGENCY DEPT VISIT MOD MDM: CPT

## 2025-05-11 PROCEDURE — 80053 COMPREHEN METABOLIC PANEL: CPT

## 2025-05-11 PROCEDURE — 84443 ASSAY THYROID STIM HORMONE: CPT

## 2025-05-11 PROCEDURE — 85025 COMPLETE CBC W/AUTO DIFF WBC: CPT

## 2025-05-11 PROCEDURE — 81001 URINALYSIS AUTO W/SCOPE: CPT

## 2025-05-11 NOTE — VIRTUAL HEALTH
psychosis  Suicidal Ideation       Plan:  Inpatient psychiatric admission at appropriate care level facility, once medically cleared and stable  Legal Status: INVOLUNTARY.  Patient is suicidal - risk of harm to self.  Sitter, suicide and elopement precautions.  Medical co-morbidities: Management per medical providers, appreciate assistance.  Medication Recommendations: Zyprexa 5 mg (PO or IM) q 4 PRN agitation.  May give IM if the patient refuses PO and is deemed to be an imminent danger to self or others at the time.  Reviewed treatment plan with patient including risks, benefits, alternatives, and side effects of medications, and any/all black box warnings. Patient verbalized understanding.  Patient had an opportunity to ask questions and address concerns. Obtained informed consent for treatment.  Medical records, labs, and diagnostic tests reviewed.   Re-consult for any new changes or concerns. Thank you for this consult.  Discussed recommendations with ED provider: Jamar Carter MD via phone call at time of consult completion.    TelePsych recommendations:Inpatient psychiatric admission    Legal hold: Initiate Involuntary Hold    Telepsychiatry will sign off. Thank you for allowing us to participate in the care of this patient. Please send message or call via [employer] if anything more is required.     Electronically signed by RITA Tee CNP on 5/11/2025 at 5:13 PM.    END OF NOTE  -------------------------    Cuco Romero, was evaluated through a synchronous (real-time) audio-video encounter. The patient (and/or guardian if applicable) is aware that this is a billable service, which includes applicable co-pays. This virtual visit was conducted with patient's (and/or legal guardian's) consent. Patient identification was verified, and a caregiver was present when appropriate.  [facility]

## 2025-05-11 NOTE — ED PROVIDER NOTES
ED PROVIDER NOTE    Chief Complaint   Patient presents with    Suicidal     +SI, - HI, no plan at this time. Hx SI attempts in past. Significant other stated to EMS pt walked inside from doing yardwork and said call the police this isn't working today.        HPI:  5/11/25,   Time: 4:22 PM EDT       Cuco Romero is a 74 y.o. male presenting to the ED for suicidal ideation.  He states that this is ongoing chronically but he he became increasingly depressed and suicidal today.  He was doing yard work and became frustrated that he is getting older and unable to do the yard work.  He does have previous history of suicide attempts.  He has been taking his antidepressants.  No HI/AVH.  No recent injury or illness.  No drug or alcohol use.    Chart review: hx of MDD, prostate ca    Review of Systems:     Review of Systems  Pertinent positives and negatives as stated in HPI     --------------------------------------------- PAST HISTORY ---------------------------------------------  Past Medical History:   Past Medical History:   Diagnosis Date    Cancer (HCC)     Depression     Major depressive disorder, recurrent episode, severe (HCC) 9/14/2011    MDD (major depressive disorder), recurrent severe, without psychosis (HCC)     Overactive bladder 9/14/2011    Prostate cancer (HCC)     Suicidal ideations     Urinary incontinence        Past Surgical History:   Past Surgical History:   Procedure Laterality Date    APPENDECTOMY      BACK SURGERY      lumbar disc, and cervical vertabrae    COLONOSCOPY      OTHER SURGICAL HISTORY  02/21/2014    Myelogram    PROSTATECTOMY      TONSILLECTOMY         Social History:   Social History     Socioeconomic History    Marital status:      Spouse name: None    Number of children: 0    Years of education: None    Highest education level: None   Tobacco Use    Smoking status: Never    Smokeless tobacco: Never   Vaping Use    Vaping status: Never Used   Substance and Sexual

## 2025-05-11 NOTE — ED NOTES
Patient reports that he is in the early parts of dementia.  He knows his , where he is, why he is here, knows who the president is, knows it is early May but stated it was .    Patient reports his living with his ex-wife which is difficult and this is the trigger for his depression/suicidal thoughts.

## 2025-05-12 LAB
EKG ATRIAL RATE: 79 BPM
EKG P AXIS: 69 DEGREES
EKG P-R INTERVAL: 154 MS
EKG Q-T INTERVAL: 400 MS
EKG QRS DURATION: 70 MS
EKG QTC CALCULATION (BAZETT): 458 MS
EKG R AXIS: 54 DEGREES
EKG T AXIS: 62 DEGREES
EKG VENTRICULAR RATE: 79 BPM

## 2025-05-12 PROCEDURE — 93010 ELECTROCARDIOGRAM REPORT: CPT | Performed by: INTERNAL MEDICINE

## 2025-05-12 NOTE — ED NOTES
[facility].  [facility] to N to be called to [phone] ext [phone].  [facility] will arrive around 10:00 PM

## 2025-06-25 ENCOUNTER — HOSPITAL ENCOUNTER (EMERGENCY)
Age: 75
Discharge: HOME | End: 2025-06-25
Attending: STUDENT IN AN ORGANIZED HEALTH CARE EDUCATION/TRAINING PROGRAM
Payer: OTHER GOVERNMENT

## 2025-06-25 ENCOUNTER — APPOINTMENT (OUTPATIENT)
Dept: CT IMAGING | Age: 75
End: 2025-06-25
Payer: OTHER GOVERNMENT

## 2025-06-25 ENCOUNTER — APPOINTMENT (OUTPATIENT)
Dept: GENERAL RADIOLOGY | Age: 75
End: 2025-06-25
Payer: OTHER GOVERNMENT

## 2025-06-25 VITALS
TEMPERATURE: 98.3 F | OXYGEN SATURATION: 99 % | RESPIRATION RATE: 18 BRPM | HEART RATE: 71 BPM | WEIGHT: 170 LBS | SYSTOLIC BLOOD PRESSURE: 116 MMHG | HEIGHT: 75 IN | DIASTOLIC BLOOD PRESSURE: 78 MMHG | BODY MASS INDEX: 21.14 KG/M2

## 2025-06-25 DIAGNOSIS — E86.0 DEHYDRATION: Primary | ICD-10-CM

## 2025-06-25 LAB
ALBUMIN SERPL-MCNC: 4 G/DL (ref 3.5–5.2)
ALP SERPL-CCNC: 83 U/L (ref 40–129)
ALT SERPL-CCNC: 7 U/L (ref 0–50)
ANION GAP SERPL CALCULATED.3IONS-SCNC: 14 MMOL/L (ref 7–16)
AST SERPL-CCNC: 21 U/L (ref 0–50)
BASOPHILS # BLD: 0.03 K/UL (ref 0–0.2)
BASOPHILS NFR BLD: 1 % (ref 0–2)
BILIRUB SERPL-MCNC: 0.9 MG/DL (ref 0–1.2)
BILIRUB UR QL STRIP: NEGATIVE
BNP SERPL-MCNC: 94 PG/ML (ref 0–450)
BUN SERPL-MCNC: 13 MG/DL (ref 8–23)
CALCIUM SERPL-MCNC: 9.2 MG/DL (ref 8.8–10.2)
CHLORIDE SERPL-SCNC: 106 MMOL/L (ref 98–107)
CLARITY UR: CLEAR
CO2 SERPL-SCNC: 21 MMOL/L (ref 22–29)
COLOR UR: YELLOW
CREAT SERPL-MCNC: 1.2 MG/DL (ref 0.7–1.2)
EKG ATRIAL RATE: 65 BPM
EKG P AXIS: 70 DEGREES
EKG P-R INTERVAL: 148 MS
EKG Q-T INTERVAL: 416 MS
EKG QRS DURATION: 70 MS
EKG QTC CALCULATION (BAZETT): 432 MS
EKG R AXIS: 68 DEGREES
EKG T AXIS: 73 DEGREES
EKG VENTRICULAR RATE: 65 BPM
EOSINOPHIL # BLD: 0.14 K/UL (ref 0.05–0.5)
EOSINOPHILS RELATIVE PERCENT: 3 % (ref 0–6)
ERYTHROCYTE [DISTWIDTH] IN BLOOD BY AUTOMATED COUNT: 13.1 % (ref 11.5–15)
GFR, ESTIMATED: 62 ML/MIN/1.73M2
GLUCOSE SERPL-MCNC: 80 MG/DL (ref 74–99)
GLUCOSE UR STRIP-MCNC: NEGATIVE MG/DL
HCT VFR BLD AUTO: 42.9 % (ref 37–54)
HGB BLD-MCNC: 14.2 G/DL (ref 12.5–16.5)
HGB UR QL STRIP.AUTO: NEGATIVE
IMM GRANULOCYTES # BLD AUTO: <0.03 K/UL (ref 0–0.58)
IMM GRANULOCYTES NFR BLD: 0 % (ref 0–5)
INFLUENZA A BY PCR: NOT DETECTED
INFLUENZA B BY PCR: NOT DETECTED
KETONES UR STRIP-MCNC: 15 MG/DL
LEUKOCYTE ESTERASE UR QL STRIP: NEGATIVE
LYMPHOCYTES NFR BLD: 1.09 K/UL (ref 1.5–4)
LYMPHOCYTES RELATIVE PERCENT: 21 % (ref 20–42)
MCH RBC QN AUTO: 31.3 PG (ref 26–35)
MCHC RBC AUTO-ENTMCNC: 33.1 G/DL (ref 32–34.5)
MCV RBC AUTO: 94.7 FL (ref 80–99.9)
MONOCYTES NFR BLD: 0.51 K/UL (ref 0.1–0.95)
MONOCYTES NFR BLD: 10 % (ref 2–12)
NEUTROPHILS NFR BLD: 65 % (ref 43–80)
NEUTS SEG NFR BLD: 3.34 K/UL (ref 1.8–7.3)
NITRITE UR QL STRIP: NEGATIVE
PH UR STRIP: 6 [PH] (ref 5–8)
PLATELET # BLD AUTO: 187 K/UL (ref 130–450)
PMV BLD AUTO: 9.8 FL (ref 7–12)
POTASSIUM SERPL-SCNC: 4 MMOL/L (ref 3.5–5.1)
PROT SERPL-MCNC: 6.2 G/DL (ref 6.4–8.3)
PROT UR STRIP-MCNC: NEGATIVE MG/DL
RBC # BLD AUTO: 4.53 M/UL (ref 3.8–5.8)
RBC #/AREA URNS HPF: ABNORMAL /HPF
SARS-COV-2 RDRP RESP QL NAA+PROBE: NOT DETECTED
SODIUM SERPL-SCNC: 141 MMOL/L (ref 136–145)
SP GR UR STRIP: 1.01 (ref 1–1.03)
SPECIMEN DESCRIPTION: NORMAL
TROPONIN I SERPL HS-MCNC: 14 NG/L (ref 0–22)
TROPONIN I SERPL HS-MCNC: 15 NG/L (ref 0–22)
UROBILINOGEN UR STRIP-ACNC: 0.2 EU/DL (ref 0–1)
WBC #/AREA URNS HPF: ABNORMAL /HPF
WBC OTHER # BLD: 5.1 K/UL (ref 4.5–11.5)

## 2025-06-25 PROCEDURE — 99285 EMERGENCY DEPT VISIT HI MDM: CPT

## 2025-06-25 PROCEDURE — 87502 INFLUENZA DNA AMP PROBE: CPT

## 2025-06-25 PROCEDURE — 84484 ASSAY OF TROPONIN QUANT: CPT

## 2025-06-25 PROCEDURE — 87086 URINE CULTURE/COLONY COUNT: CPT

## 2025-06-25 PROCEDURE — 80053 COMPREHEN METABOLIC PANEL: CPT

## 2025-06-25 PROCEDURE — 71045 X-RAY EXAM CHEST 1 VIEW: CPT

## 2025-06-25 PROCEDURE — 87635 SARS-COV-2 COVID-19 AMP PRB: CPT

## 2025-06-25 PROCEDURE — 96361 HYDRATE IV INFUSION ADD-ON: CPT

## 2025-06-25 PROCEDURE — 83880 ASSAY OF NATRIURETIC PEPTIDE: CPT

## 2025-06-25 PROCEDURE — 81001 URINALYSIS AUTO W/SCOPE: CPT

## 2025-06-25 PROCEDURE — 85025 COMPLETE CBC W/AUTO DIFF WBC: CPT

## 2025-06-25 PROCEDURE — 70450 CT HEAD/BRAIN W/O DYE: CPT

## 2025-06-25 PROCEDURE — 93010 ELECTROCARDIOGRAM REPORT: CPT | Performed by: INTERNAL MEDICINE

## 2025-06-25 PROCEDURE — 93005 ELECTROCARDIOGRAM TRACING: CPT | Performed by: STUDENT IN AN ORGANIZED HEALTH CARE EDUCATION/TRAINING PROGRAM

## 2025-06-25 PROCEDURE — 6360000002 HC RX W HCPCS: Performed by: STUDENT IN AN ORGANIZED HEALTH CARE EDUCATION/TRAINING PROGRAM

## 2025-06-25 PROCEDURE — 2580000003 HC RX 258: Performed by: STUDENT IN AN ORGANIZED HEALTH CARE EDUCATION/TRAINING PROGRAM

## 2025-06-25 PROCEDURE — 96374 THER/PROPH/DIAG INJ IV PUSH: CPT

## 2025-06-25 RX ORDER — 0.9 % SODIUM CHLORIDE 0.9 %
1000 INTRAVENOUS SOLUTION INTRAVENOUS ONCE
Status: COMPLETED | OUTPATIENT
Start: 2025-06-25 | End: 2025-06-25

## 2025-06-25 RX ORDER — PROCHLORPERAZINE EDISYLATE 5 MG/ML
10 INJECTION INTRAMUSCULAR; INTRAVENOUS ONCE
Status: COMPLETED | OUTPATIENT
Start: 2025-06-25 | End: 2025-06-25

## 2025-06-25 RX ADMIN — SODIUM CHLORIDE 1000 ML: 0.9 INJECTION, SOLUTION INTRAVENOUS at 11:44

## 2025-06-25 RX ADMIN — PROCHLORPERAZINE EDISYLATE 10 MG: 5 INJECTION INTRAMUSCULAR; INTRAVENOUS at 11:44

## 2025-06-25 NOTE — DISCHARGE INSTRUCTIONS
Increase fluids  Follow-up with primary care doctor  If you notice any new worrisome symptoms please return to emergency department for evaluation

## 2025-06-26 LAB
MICROORGANISM SPEC CULT: ABNORMAL
SERVICE CMNT-IMP: ABNORMAL
SPECIMEN DESCRIPTION: ABNORMAL

## 2025-06-27 ENCOUNTER — RESULTS FOLLOW-UP (OUTPATIENT)
Dept: EMERGENCY DEPT | Age: 75
End: 2025-06-27

## 2025-06-27 NOTE — ED PROVIDER NOTES
Patient was given the following medications:  Medications   sodium chloride 0.9 % bolus 1,000 mL (0 mLs IntraVENous Stopped 6/25/25 1201)   prochlorperazine (COMPAZINE) injection 10 mg (10 mg IntraVENous Given 6/25/25 1144)         Is this patient to be included in the SEP-1 core measure due to severe sepsis or septic shock? No Exclusion criteria - the patient is NOT to be included for SEP-1 Core Measure due to: Infection is not suspected        Medical Decision Making/Differential Diagnosis:    CC/HPI Summary, Social Determinants of health, Records Reviewed, DDx, testing done/not done, ED Course, Reassessment, disposition considerations/shared decision making with patient, consults, disposition:      ED Course as of 06/27/25 1100 Wed Jun 25, 2025 1138 Patient orthostatic positive IV fluids ordered. [BP]      ED Course User Index  [BP] Kenneth Bermudez,         Chronic Conditions:   Past Medical History:   Diagnosis Date    Cancer (HCC)     Depression     Major depressive disorder, recurrent episode, severe (HCC) 9/14/2011    MDD (major depressive disorder), recurrent severe, without psychosis (HCC)     Overactive bladder 9/14/2011    Prostate cancer (HCC)     Suicidal ideations     Urinary incontinence        CONSULTS: (Who and What was discussed)  None    Discussion with Other Profesionals : None    Social Determinants : None    Records Reviewed : None    CC/HPI Summary, DDx, ED Course, and Reassessment:   Patient is 75-year-old male past medical history of hyperlipidemia, borderline personality disorder as well as hypothyroidism and depression.  Patient presents with chief complaint of dizziness as well as mild headache and shakiness.  Vital signs stable presentation.  Physical exam heart regular rate and rhythm, lungs clear auscultation bilaterally, abdomen soft nontender no rebound or guarding.  No focal neurologic deficits.  Pupils equal round reactive to light, extraocular eye movements are